# Patient Record
Sex: MALE | Race: WHITE | Employment: OTHER | URBAN - METROPOLITAN AREA
[De-identification: names, ages, dates, MRNs, and addresses within clinical notes are randomized per-mention and may not be internally consistent; named-entity substitution may affect disease eponyms.]

---

## 2017-07-28 PROBLEM — I65.23 BILATERAL CAROTID ARTERY STENOSIS: Chronic | Status: ACTIVE | Noted: 2017-07-28

## 2020-07-28 ENCOUNTER — HOSPITAL ENCOUNTER (OUTPATIENT)
Dept: PHYSICAL THERAPY | Age: 80
Discharge: HOME | End: 2020-07-28
Payer: MEDICARE

## 2020-07-28 ENCOUNTER — HOME HEALTH ADMISSION (OUTPATIENT)
Dept: HOME HEALTH SERVICES | Facility: HOME HEALTH | Age: 80
End: 2020-07-28
Payer: MEDICARE

## 2020-07-28 ENCOUNTER — HOSPITAL ENCOUNTER (OUTPATIENT)
Dept: SURGERY | Age: 80
Discharge: HOME | End: 2020-07-28
Payer: MEDICARE

## 2020-07-28 VITALS
TEMPERATURE: 97.4 F | OXYGEN SATURATION: 95 % | HEIGHT: 65 IN | RESPIRATION RATE: 16 BRPM | WEIGHT: 197.4 LBS | BODY MASS INDEX: 32.89 KG/M2 | SYSTOLIC BLOOD PRESSURE: 139 MMHG | DIASTOLIC BLOOD PRESSURE: 87 MMHG | HEART RATE: 59 BPM

## 2020-07-28 LAB
ANION GAP SERPL CALC-SCNC: 8 MMOL/L (ref 7–16)
APTT PPP: 27.1 SEC (ref 24.3–35.4)
ATRIAL RATE: 468 BPM
BACTERIA SPEC CULT: NORMAL
BASOPHILS # BLD: 0.1 K/UL (ref 0–0.2)
BASOPHILS NFR BLD: 1 % (ref 0–2)
BUN SERPL-MCNC: 30 MG/DL (ref 8–23)
CALCIUM SERPL-MCNC: 9.4 MG/DL (ref 8.3–10.4)
CALCULATED R AXIS, ECG10: 50 DEGREES
CALCULATED T AXIS, ECG11: 55 DEGREES
CHLORIDE SERPL-SCNC: 105 MMOL/L (ref 98–107)
CO2 SERPL-SCNC: 28 MMOL/L (ref 21–32)
CREAT SERPL-MCNC: 1.6 MG/DL (ref 0.8–1.5)
DIAGNOSIS, 93000: NORMAL
DIFFERENTIAL METHOD BLD: NORMAL
EOSINOPHIL # BLD: 0.2 K/UL (ref 0–0.8)
EOSINOPHIL NFR BLD: 2 % (ref 0.5–7.8)
ERYTHROCYTE [DISTWIDTH] IN BLOOD BY AUTOMATED COUNT: 13.1 % (ref 11.9–14.6)
EST. AVERAGE GLUCOSE BLD GHB EST-MCNC: 148 MG/DL
GLUCOSE SERPL-MCNC: 129 MG/DL (ref 65–100)
HBA1C MFR BLD: 6.8 %
HCT VFR BLD AUTO: 48.6 % (ref 41.1–50.3)
HGB BLD-MCNC: 16.2 G/DL (ref 13.6–17.2)
IMM GRANULOCYTES # BLD AUTO: 0 K/UL (ref 0–0.5)
IMM GRANULOCYTES NFR BLD AUTO: 0 % (ref 0–5)
INR PPP: 1.1
LYMPHOCYTES # BLD: 1.1 K/UL (ref 0.5–4.6)
LYMPHOCYTES NFR BLD: 14 % (ref 13–44)
MCH RBC QN AUTO: 32.5 PG (ref 26.1–32.9)
MCHC RBC AUTO-ENTMCNC: 33.3 G/DL (ref 31.4–35)
MCV RBC AUTO: 97.4 FL (ref 79.6–97.8)
MONOCYTES # BLD: 0.7 K/UL (ref 0.1–1.3)
MONOCYTES NFR BLD: 9 % (ref 4–12)
NEUTS SEG # BLD: 5.9 K/UL (ref 1.7–8.2)
NEUTS SEG NFR BLD: 74 % (ref 43–78)
NRBC # BLD: 0 K/UL (ref 0–0.2)
PLATELET # BLD AUTO: 212 K/UL (ref 150–450)
PMV BLD AUTO: 11.6 FL (ref 9.4–12.3)
POTASSIUM SERPL-SCNC: 4.1 MMOL/L (ref 3.5–5.1)
PROTHROMBIN TIME: 14.6 SEC (ref 12–14.7)
Q-T INTERVAL, ECG07: 416 MS
QRS DURATION, ECG06: 82 MS
QTC CALCULATION (BEZET), ECG08: 416 MS
RBC # BLD AUTO: 4.99 M/UL (ref 4.23–5.6)
SERVICE CMNT-IMP: NORMAL
SODIUM SERPL-SCNC: 141 MMOL/L (ref 136–145)
VENTRICULAR RATE, ECG03: 60 BPM
WBC # BLD AUTO: 7.9 K/UL (ref 4.3–11.1)

## 2020-07-28 PROCEDURE — 83036 HEMOGLOBIN GLYCOSYLATED A1C: CPT

## 2020-07-28 PROCEDURE — 85025 COMPLETE CBC W/AUTO DIFF WBC: CPT

## 2020-07-28 PROCEDURE — 93005 ELECTROCARDIOGRAM TRACING: CPT | Performed by: PHYSICIAN ASSISTANT

## 2020-07-28 PROCEDURE — 36415 COLL VENOUS BLD VENIPUNCTURE: CPT

## 2020-07-28 PROCEDURE — 85610 PROTHROMBIN TIME: CPT

## 2020-07-28 PROCEDURE — 80048 BASIC METABOLIC PNL TOTAL CA: CPT

## 2020-07-28 PROCEDURE — 85730 THROMBOPLASTIN TIME PARTIAL: CPT

## 2020-07-28 PROCEDURE — 97161 PT EVAL LOW COMPLEX 20 MIN: CPT

## 2020-07-28 PROCEDURE — 77030027138 HC INCENT SPIROMETER -A

## 2020-07-28 PROCEDURE — 87641 MR-STAPH DNA AMP PROBE: CPT

## 2020-07-28 NOTE — PERIOP NOTES
Patient verified name and . Order for consent  found in EHR and matches case posting; patient verified. Left total hip arthroplasty     Type 3 surgery, PAT Joint assessment complete. Labs per surgeon: CBC,BMP, PT/PTT, HgbA1C; results pending. Labs per anesthesia protocol: no additional lab work needed. EKG:Done today- within anesthesia guidelines. Last cardiology office visit dated 10/2/19, last EKG dated 3/22/19, last Echo dated 19, last cath dated 5/27/10 and cardiac clearance with 3 day eliquis hold dated 20 found in EHR if needed for anesthesia reference. A negative Covid swab result is required to proceed with surgery; The testing center is located at the [address], [place]. An appointment is required therefore the patient will be contacted by the Covid swab team. The testing clinic is closed from 12-1 for lunch and on weekends. For questions or concerns the patient should call [phone]. Appointment date/time  found in EHR and provided to patient. MRSA/MSSA swab collected; pharmacy to review and dose antibiotic as appropriate. Hospital approved surgical skin cleanser and instructions to return bottle on DOS given per hospital policy. Patient provided with handouts including Guide to Surgery, Pain Management, Hand Hygiene, Blood Transfusion Education, and [facility] Brochure. Patient answered medical/surgical history questions at their best of ability. All prior to admission medications documented in [facility]. Original medication prescription bottle not visualized during patient appointment. Patient instructed to hold all vitamins 3 weeks prior to surgery and NSAIDS 5 days prior to surgery. Patient teach back successful and patient demonstrates knowledge of instruction.

## 2020-07-28 NOTE — PERIOP NOTES
CRT 1.6. Daniel Hazy, NP notified. No new orders received. Lab results within anesthesia guidelines. Lab results sent to PCP per surgeon's request.       Recent Results (from the past 12 hour(s))   CBC WITH AUTOMATED DIFF    Collection Time: 07/28/20 11:57 AM   Result Value Ref Range    WBC 7.9 4.3 - 11.1 K/uL    RBC 4.99 4.23 - 5.6 M/uL    HGB 16.2 13.6 - 17.2 g/dL    HCT 48.6 41.1 - 50.3 %    MCV 97.4 79.6 - 97.8 FL    MCH 32.5 26.1 - 32.9 PG    MCHC 33.3 31.4 - 35.0 g/dL    RDW 13.1 11.9 - 14.6 %    PLATELET 129 153 - 093 K/uL    MPV 11.6 9.4 - 12.3 FL    ABSOLUTE NRBC 0.00 0.0 - 0.2 K/uL    DF AUTOMATED      NEUTROPHILS 74 43 - 78 %    LYMPHOCYTES 14 13 - 44 %    MONOCYTES 9 4.0 - 12.0 %    EOSINOPHILS 2 0.5 - 7.8 %    BASOPHILS 1 0.0 - 2.0 %    IMMATURE GRANULOCYTES 0 0.0 - 5.0 %    ABS. NEUTROPHILS 5.9 1.7 - 8.2 K/UL    ABS. LYMPHOCYTES 1.1 0.5 - 4.6 K/UL    ABS. MONOCYTES 0.7 0.1 - 1.3 K/UL    ABS. EOSINOPHILS 0.2 0.0 - 0.8 K/UL    ABS. BASOPHILS 0.1 0.0 - 0.2 K/UL    ABS. IMM.  GRANS. 0.0 0.0 - 0.5 K/UL   HEMOGLOBIN A1C WITH EAG    Collection Time: 07/28/20 11:57 AM   Result Value Ref Range    Hemoglobin A1c 6.8 %    Est. average glucose 469 mg/dL   METABOLIC PANEL, BASIC    Collection Time: 07/28/20 11:57 AM   Result Value Ref Range    Sodium 141 136 - 145 mmol/L    Potassium 4.1 3.5 - 5.1 mmol/L    Chloride 105 98 - 107 mmol/L    CO2 28 21 - 32 mmol/L    Anion gap 8 7 - 16 mmol/L    Glucose 129 (H) 65 - 100 mg/dL    BUN 30 (H) 8 - 23 MG/DL    Creatinine 1.60 (H) 0.8 - 1.5 MG/DL    GFR est AA 54 (L) >60 ml/min/1.73m2    GFR est non-AA 44 (L) >60 ml/min/1.73m2    Calcium 9.4 8.3 - 10.4 MG/DL   PROTHROMBIN TIME + INR    Collection Time: 07/28/20 11:57 AM   Result Value Ref Range    Prothrombin time 14.6 12.0 - 14.7 sec    INR 1.1     PTT    Collection Time: 07/28/20 11:57 AM   Result Value Ref Range    aPTT 27.1 24.3 - 35.4 SEC

## 2020-07-28 NOTE — PROGRESS NOTES
Eliane Patiño  : [birth year](72 y.o.) [address] at [address], [place] [address].  [phone]       Physical Therapy Prehab Plan of Treatment and Evaluation Summary:2020    ICD-10: Treatment Diagnosis:   · Pain in left hip (M25.552)  · Stiffness of Left Hip, Not elsewhere classified (M25.652)  · Difficulty in walking, Not elsewhere classified (R26.2)  · Other abnormalities of gait and mobility (R26.89)  Precautions/Allergies:   Patient has no known allergies. MEDICAL/REFERRING DIAGNOSIS:  Unilateral primary osteoarthritis, left hip [M16.12]  REFERRING PHYSICIAN: Yung Moreno,*  DATE OF SURGERY: 20    Assessment:   Comments:  Pt presents using a walker. Pain in left hip joint with decreased independence with functional mobility. Pt plans to return home following hospital stay. Pt expresses concern regarding length of stay, hoping he can stay long enough to monitor pts vitals. Pt motivated and prepared for surgery. Pt's wife present and supportive. 8/3--Returned phone call received from pt's daughter. Pt's daughter states her parents are hard of hearing, so she wanted to touch base. Pt's daughter understands that the pt will be receiving home health care upon returning from the hospital. Pt daughter states her mom will be unable to assist her father physically. Pt's daughter also inquiring as to how pt will bathe. Currently, pt takes tub baths. Remember speaking with pt and pt's wife during prehab session and suggesting a tub bench. I believe pt's wife said they have shower door, which would make this difficult. Pt's daughter going to check around to see if she can find a small shower chair that would be adequate. PROBLEM LIST (Impacting functional limitations):  Mr. Girish Pond presents with the following left lower extremity(s) problems:  1. Transfers  2. Gait  3. Strength  4. Range of Motion  5. Pain   INTERVENTIONS PLANNED:  1.  Home Exercise Program  2. Educational Discussion      TREATMENT PLAN: Effective Dates: 7/28/2020 TO 7/28/2020. Frequency/Duration: Patient to continue to perform home exercise program at least twice per day up until his surgery. GOALS: (Goals have been discussed and agreed upon with patient.)  Discharge Goals: Time Frame: 1 Day  1. Patient will demonstrate independence with a home exercise program designed to increase strength, range of motion and pain control to minimize functional deficits and optimize patient for total joint replacement. Rehabilitation Potential For Stated Goals: Good  Regarding Tommie Mariscal's therapy, I certify that the treatment plan above will be carried out by a therapist or under their direction. Thank you for this referral,  Martin Bragg, PT               HISTORY:   Present Symptoms:  Pain Intensity 1: 10  Pain Location 1: Hip  Pain Orientation 1: Left   History of Present Injury/Illness (Reason for Referral):  Medical/Referring Diagnosis: Unilateral primary osteoarthritis, left hip [M16.12]   Past Medical History/Comorbidities:   Mr. CHICASΤΟΚΟΠΟTRIPP  has a past medical history of Arrhythmia, Arthritis, CAD (coronary artery disease) (2010), Coagulation disorder ([facility] [address] Coronary atherosclerosis of native coronary artery (5/27/2010), Diabetes ([address] (type 2), Dyslipidemia (5/27/2010), Essential hypertension, benign ( ), History of coronary artery stent placement (2010), History of kidney stones, History of prior ablation treatment (2014), Kidney disease, Paroxysmal atrial fibrillation ([address] (10/2/2015), Platelet inhibition due to Plavix, Post PTCA, Shingles outbreak, and Syncope and collapse (07/25/2014). He also has no past medical history of Coagulation defects or Unspecified adverse effect of anesthesia.   Mr. CHICASΤΟΚΟΠΟTRIPP  has a past surgical history that includes hx cyst removal (years ago); hx heart catheterization (2010); pr cardiac surg procedure unlist (2014); and hx cataract removal (Bilateral). Social History/Living Environment:   Home Environment: Private residence  # Steps to Enter: 1  One/Two Story Residence: One story  Living Alone: No  Support Systems: Spouse/Significant Other/Partner  Patient Expects to be Discharged to[de-identified] Private residence  Current DME Used/Available at Home: [address], [address], straight, [employer], bedside  Tub or Shower Type: Tub/Shower combination  Work/Activity:  retired  Dominant Side:  LEFT  Current Medications:  See Pre-assessment nursing note   Number of Personal Factors/Comorbidities that affect the Plan of Care: 0: LOW COMPLEXITY   EXAMINATION:   ADLs (Current Functional Status):   Ambulation:  [x] Independent  [] Walk Indoors Only  [] Walk Outdoors  [] Use Assistive Device  [] Use Wheelchair Only Dressing:  [x] Independent  Requires Assistance from Someone for:  [] Sock/Shoes  [] Pants  [] Everything   Bathing/Showering:   [x] Independent  [] Requires Assistance from Someone  [] [address]:  [x] Routine house and yard work  [] Light Housework Only  [] None   Observation/Orthostatic Postural Assessment:   Within defined limits   ROM/Flexibility:   AROM: Generally decreased, functional                       RLE Assessment  RLE Assessment (WDL): Within defined limits   Strength:   Strength: Generally decreased, functional                  Functional Mobility:         Gait Description (WDL): Within defined limits  Stand to Sit: Independent  Sit to Stand: Independent  Distance (ft): 200 Feet (ft)  Ambulation - Level of Assistance: Independent  Gait Abnormalities: Antalgic          Balance:    Sitting: Intact  Standing: Intact   Body Structures Involved:  1. Bones  2. Joints  3. Muscles  4. Ligaments Body Functions Affected:  1. Neuromusculoskeletal  2. Movement Related Activities and Participation Affected:  1.  Mobility   Number of elements that affect the Plan of Care: 4+: HIGH COMPLEXITY   CLINICAL PRESENTATION:   Presentation: Stable and uncomplicated: LOW COMPLEXITY   CLINICAL DECISION MAKING:   Outcome Measure: Tool Used: Lower Extremity Functional Scale (LEFS)  Score:  Initial: 25/80 Most Recent: X/80 (Date: -- )   Interpretation of Score: 20 questions each scored on a 5 point scale with 0 representing \"extreme difficulty or unable to perform\" and 4 representing \"no difficulty\". The lower the score, the greater the functional disability. 80/80 represents no disability. Minimal detectable change is 9 points. Medical Necessity:   · Mr. Roni Roberts is expected to optimize his lower extremity strength and ROM in preparation for joint replacement surgery. Reason for Services/Other Comments:  · Achieve baseline assesment of musculoskeletal system, functional mobility and home environment. , educate in PT HEP in preparation for surgery, educate in hospital plan of care. Use of outcome tool(s) and clinical judgement create a POC that gives a: Clear prediction of patient's progress: LOW COMPLEXITY   TREATMENT:   Treatment/Session Assessment:  Patient was instructed in PT- HEP to increase strength and ROM in LEs. Answered all questions. · Post session pain:  10  · Compliance with Program/Exercises: compliant most of the time.   Total Treatment Duration:  PT Patient Time In/Time Out  Time In: 1215  Time Out: [address], PT

## 2020-07-28 NOTE — PROGRESS NOTES
07/28/20 1200   Oxygen Therapy   O2 Sat (%) 98 %   Pulse via Oximetry 52 beats per minute   O2 Device Room air   Pre-Treatment   Breath Sounds Bilateral Clear;Diminished   Incentive Spirometry Treatment   Actual Volume (ml) 2000 ml     Initial respiratory Assessment completed with pt. Pt was interviewed and evaluated in Joint camp prior to surgery. Patient ID:  Arminda Crowley  [id number]  52 y.o.  1940  Surgeon: Dr. Ree Grider  Date of Surgery: 8/11/2020  Procedure: Total Right Hip Arthroplasty  Primary Care Physician: Joon Mcbride [phone]  Specialists:     Pt. IS PRACTICING SOCIAL DISTANCING AND WEARING A MASK WHEN IN PUBLIC. Pt instructed in the use of Incentive Spirometry. Pt instructed to bring Incentive Spirometer back on date of surgery & to start using Is upon return to pt room. Written instructions given to patient.   Pt taught proper cough technique    History of smoking:   DENIES                 Quit date:         Secondhand smoke:father    Past procedures with Oxygen desaturation or delayed awakening:DENIES    Past Medical History:   Diagnosis Date    Arrhythmia     AFlutter/SVT    Arthritis     CAD (coronary artery disease) 2010    2 xstents, Followed by Dr. Vee Gayle Coagulation disorder [facility])     eliquis/plavix    Coronary atherosclerosis of native coronary artery 5/27/2010    2010:  PCI LAD Xience x 2    Diabetes ([address] type 2    Controlled with diet     Dyslipidemia 5/27/2010    Essential hypertension, benign      History of coronary artery stent placement 2010    LAC, LAD stented    History of kidney stones     History of prior ablation treatment 2014    due to atrial fib    Kidney disease     Was seen by [place] [facility] after sepsis for acute Kidney disease, has since been released    Paroxysmal atrial fibrillation ([address] 10/2/2015    Platelet inhibition due to Plavix     Post PTCA     WITH STENT    Sepsis ([address]     Shingles outbreak     Syncope and collapse 07/25/2014        Respiratory history:                                 SOB  ON EXERTION                                    Respiratory meds:  DENIES    FAMILY PRESENT:          WIFE                                                PAST SLEEP STUDY:                      DENIES  HX OF NATANAEL:                                        DENIES  NATANAEL assessment:     HX OF A-FIB                                          SLEEPS ON SIDE       &      BACK                                                      PHYSICAL EXAM   Body mass index is 32.85 kg/m².    Visit Vitals  /87 (BP 1 Location: Left arm, BP Patient Position: At rest;Sitting)   Pulse (!) 59   Temp 97.4 °F (36.3 °C)   Resp 16   Ht 5' 5\" (1.651 m)   Wt 89.5 kg (197 lb 6.4 oz)   SpO2 95%   BMI 32.85 kg/m²     Neck circumference:  44    cm    Loud snoring:                                                 YES             Witnessed apnea or wakening gasping or choking:        DENIES       Awakens with headaches:                                               DENIES  Morning or daytime tiredness/ sleepiness:                              TIRED  Dry mouth or sore throat in morning:            YES         ]                                   Sandy stage:  4                                   SACS score:25  Stop Bang   STOP-BANG  Does the patient snore loudly (louder than talking or loud enough to be heard through closed doors)?: Yes  Does the patient often feel tired, fatigued, or sleepy during the daytime, even after a \"good\" night's sleep?: Yes  Has anyone ever observed the patient stop breathing during their sleep? : Yes  Does the patient have or are they being treated for high blood pressure?: Yes  Is the patient's BMI greater than 35?: No  Is your neck circumference greater than 17 inches (Male) or 16 inches (Female)?: Yes  Is the patient older than 48?: Yes  Is the patient male?: Yes  NATANAEL Score: 7  Has the patient been referred to Sleep Medicine?: No  Has the patient previously been diagnosed with Obstructive Sleep Apnea?: No                                  [facility]                                    Referrals:  DECLINED  Pt.  Phone Number:

## 2020-07-28 NOTE — PERIOP NOTES
PLEASE CONTINUE TAKING ALL PRESCRIPTION MEDICATIONS UP TO THE DAY OF SURGERY UNLESS OTHERWISE DIRECTED BELOW. DISCONTINUE all vitamins and supplements 21 days prior to surgery. DISCONTINUE Non-Steriodal Anti-Inflammatory (NSAIDS) such as Advil and Aleve 5 days prior to surgery. Home Medications to take  the day of surgery    aspirin, metoprolol           Home Medications   to Hold   Stop eliquis 3 days prior to surgery   Stop meloxicam 5 days prior to surgery      Comments          *Visitor policy of 1 visitor per patient discussed. Please do not bring home medications with you on the day of surgery unless otherwise directed by your nurse. If you are instructed to bring home medications, please give them to your nurse as they will be administered by the nursing staff. If you have any questions, please call [facility] [phone]. A copy of this note was provided to the patient for reference.

## 2020-07-29 NOTE — ADVANCED PRACTICE NURSE
Total Joint Surgery Preoperative Chart Review      Patient ID:  Gifty Quezada  [id number]  48 y.o.  1940  Surgeon: Dr. Lasha Elizabeth  Date of Surgery: 8/11/2020  Procedure: Total Left Hip Arthroplasty  Primary Care Physician: Clem Corral [phone]  Specialty Physician(s):      Subjective:   Gifty Quezada is a [de-identified] y.o. WHITE OR  male who presents for preoperative evaluation for Total Left Hip arthroplasty. This is a preoperative chart review note based on data collected by the nurse at the surgical Pre-Assessment visit.     Past Medical History:   Diagnosis Date    Arrhythmia     AFlutter/SVT    Arthritis     CAD (coronary artery disease) 2010    2 xstents, Followed by Dr. Zoe Holcomb Coagulation disorder [facility])     eliquis/plavix    Coronary atherosclerosis of native coronary artery 5/27/2010    2010:  PCI LAD Xience x 2    Diabetes ([address] type 2    Controlled with diet     Dyslipidemia 5/27/2010    Essential hypertension, benign      History of coronary artery stent placement 2010    LAC, LAD stented    History of kidney stones     History of prior ablation treatment 2014    due to atrial fib    Kidney disease     Was seen by [place] [facility] after sepsis for acute Kidney disease, has since been released    Paroxysmal atrial fibrillation ([address] 10/2/2015    Platelet inhibition due to Plavix     Post PTCA     WITH STENT    Sepsis ([address]     Shingles outbreak     Syncope and collapse 07/25/2014      Past Surgical History:   Procedure Laterality Date    CARDIAC SURG PROCEDURE UNLIST  2014    ablation    HX CATARACT REMOVAL Bilateral     HX CYST REMOVAL  years ago    scrotal    HX HEART CATHETERIZATION  2010    2 stents     Family History   Problem Relation Age of Onset    Heart Disease Mother     Diabetes Father     Diabetes Brother     Diabetes Brother     Cancer Brother     Heart Disease Sister         mild heart attack      Social History     Tobacco Use  Smoking status: Never Smoker    Smokeless tobacco: Never Used   Substance Use Topics    Alcohol use: No       Prior to Admission medications    Medication Sig Start Date End Date Taking? Authorizing Provider   apixaban (ELIQUIS) 5 mg tablet Take 1 Tab by mouth two (2) times a day. 3/23/20  Yes Romeo Alexander MD   losartan (COZAAR) 100 mg tablet Take 1 Tab by mouth daily. 3/22/19  Yes Romeo Alexander MD   pravastatin (PRAVACHOL) 40 mg tablet Take 1 Tab by mouth nightly. Patient taking differently: Take 20 mg by mouth nightly. 3/22/19  Yes Romeo Alexander MD   aspirin delayed-release 81 mg tablet Take 1 Tab by mouth daily. 3/14/18  Yes Romeo Alexander MD   metoprolol tartrate (LOPRESSOR) 25 mg tablet Take 0.5 Tabs by mouth two (2) times a day. Indications: VENTRICULAR RATE CONTROL IN ATRIAL FIBRILLATION 1/27/17  Yes Minh Orantes MD   hydrochlorothiazide (MICROZIDE) 12.5 mg capsule Take 12.5 mg by mouth daily. Yes Provider, Historical   meloxicam (MOBIC) 7.5 mg tablet Take 7.5 mg by mouth daily as needed. 2/22/16  Yes Provider, Historical   nitroglycerin (NITROSTAT) 0.4 mg SL tablet 0.4 mg by SubLINGual route every five (5) minutes as needed for Chest Pain. Yes Other, MD Kobe     No Known Allergies       Objective:     Physical Exam:   Patient Vitals for the past 24 hrs:   Temp Pulse Resp BP SpO2   07/28/20 1322 97.4 °F (36.3 °C) (!) 59 16 139/87 95 %       ECG:    EKG Results     Procedure 720 Value Units Date/Time    EKG, 12 LEAD, INITIAL [[id number]] Collected:  07/28/20 1254    Order Status:  Completed Updated:  07/28/20 1745     Ventricular Rate 60 BPM      Atrial Rate 468 BPM      QRS Duration 82 ms      Q-T Interval 416 ms      QTC Calculation (Bezet) 416 ms      Calculated R Axis 50 degrees      Calculated T Axis 55 degrees      Diagnosis --     Atrial fibrillation  Abnormal ECG  When compared with ECG of 26-JUN-2015 17:18,  Atrial fibrillation has replaced Sinus rhythm  Vent. rate has decreased BY  34 BPM  Confirmed by BRIDGET CH ()Heydi ([id number]) on 7/28/2020 5:45:05 PM            Data Review:   Labs:   Recent Results (from the past 24 hour(s))   EKG, 12 LEAD, INITIAL    Collection Time: 07/28/20 12:54 PM   Result Value Ref Range    Ventricular Rate 60 BPM    Atrial Rate 468 BPM    QRS Duration 82 ms    Q-T Interval 416 ms    QTC Calculation (Bezet) 416 ms    Calculated R Axis 50 degrees    Calculated T Axis 55 degrees    Diagnosis       Atrial fibrillation  Abnormal ECG  When compared with ECG of 26-JUN-2015 17:18,  Atrial fibrillation has replaced Sinus rhythm  Vent. rate has decreased BY  34 BPM  Confirmed by BRIDGET CH ()Heydi ([id number]) on 7/28/2020 5:45:05 PM     MSSA/MRSA SC BY PCR, NASAL SWAB    Collection Time: 07/28/20  1:41 PM    Specimen: Nasal swab   Result Value Ref Range    Special Requests: NO SPECIAL REQUESTS      Culture result:        SA target not detected. A MRSA NEGATIVE, SA NEGATIVE test result does not preclude MRSA or SA nasal colonization. Labs reviewed    Problem List:  )  Patient Active Problem List   Diagnosis Code    Atherosclerosis of native coronary artery of native heart with stable angina pectoris ([facility] [address] I25.118    Essential hypertension, benign I10    Dyslipidemia E78.5    WPW (Regi-Parkinson-White syndrome) I45.6    Acute pyelonephritis N10    Sepsis ([address] A41.9    Ureteral stone N20.1    ANNA (acute kidney injury) ([facility] [address] N17.9    Acute on chronic kidney disease, stage 4 N18.4    Obstructed, uropathy N13.9    Hypotension I95.9    Bacteremia due to Gram-negative bacteria R78.81    Chronic atrial fibrillation (HCC) I48.20    Bilateral carotid artery stenosis I65.23       Total Joint Surgery Pre-Assessment Recommendations:           He/she is a moderate risk for sleep apnea but is not interested in additional work up at this time. Will initiate perioperative NATANAEL precautions.  Recommend continuous saturation monitoring hours of sleep, during hospitalization.           Signed By: ELMER Valentine    July 29, 2020

## 2020-08-07 RX ORDER — CEFAZOLIN SODIUM/WATER 2 G/20 ML
2 SYRINGE (ML) INTRAVENOUS ONCE
Status: CANCELLED | OUTPATIENT
Start: 2020-08-11 | End: 2020-08-11

## 2020-08-10 ENCOUNTER — ANESTHESIA EVENT (OUTPATIENT)
Dept: SURGERY | Age: 80
DRG: 470 | End: 2020-08-10
Payer: MEDICARE

## 2020-08-11 ENCOUNTER — HOSPITAL ENCOUNTER (INPATIENT)
Age: 80
LOS: 2 days | Discharge: HOME HEALTH | DRG: 470 | End: 2020-08-13
Attending: ORTHOPAEDIC SURGERY | Admitting: ORTHOPAEDIC SURGERY
Payer: MEDICARE

## 2020-08-11 ENCOUNTER — APPOINTMENT (OUTPATIENT)
Dept: GENERAL RADIOLOGY | Age: 80
DRG: 470 | End: 2020-08-11
Attending: PHYSICIAN ASSISTANT
Payer: MEDICARE

## 2020-08-11 ENCOUNTER — ANESTHESIA (OUTPATIENT)
Dept: SURGERY | Age: 80
DRG: 470 | End: 2020-08-11
Payer: MEDICARE

## 2020-08-11 DIAGNOSIS — Z96.642 STATUS POST TOTAL HIP REPLACEMENT, LEFT: Primary | ICD-10-CM

## 2020-08-11 PROBLEM — M16.12 ARTHRITIS OF LEFT HIP: Status: ACTIVE | Noted: 2020-08-11

## 2020-08-11 LAB
GLUCOSE BLD STRIP.AUTO-MCNC: 109 MG/DL (ref 65–100)
HGB BLD-MCNC: 14.3 G/DL (ref 13.6–17.2)

## 2020-08-11 PROCEDURE — 0SRB03A REPLACEMENT OF LEFT HIP JOINT WITH CERAMIC SYNTHETIC SUBSTITUTE, UNCEMENTED, OPEN APPROACH: ICD-10-PCS | Performed by: ORTHOPAEDIC SURGERY

## 2020-08-11 PROCEDURE — 77030006835 HC BLD SAW SAG STRY -B: Performed by: ORTHOPAEDIC SURGERY

## 2020-08-11 PROCEDURE — 97165 OT EVAL LOW COMPLEX 30 MIN: CPT

## 2020-08-11 PROCEDURE — C1776 JOINT DEVICE (IMPLANTABLE): HCPCS | Performed by: ORTHOPAEDIC SURGERY

## 2020-08-11 PROCEDURE — 76060000034 HC ANESTHESIA 1.5 TO 2 HR: Performed by: ORTHOPAEDIC SURGERY

## 2020-08-11 PROCEDURE — 74011000250 HC RX REV CODE- 250: Performed by: ANESTHESIOLOGY

## 2020-08-11 PROCEDURE — 77030040361 HC SLV COMPR DVT MDII -B

## 2020-08-11 PROCEDURE — 77030040922 HC BLNKT HYPOTHRM STRY -A: Performed by: ANESTHESIOLOGY

## 2020-08-11 PROCEDURE — 72170 X-RAY EXAM OF PELVIS: CPT

## 2020-08-11 PROCEDURE — 77030018547 HC SUT ETHBND1 J&J -B: Performed by: ORTHOPAEDIC SURGERY

## 2020-08-11 PROCEDURE — 74011000250 HC RX REV CODE- 250: Performed by: ORTHOPAEDIC SURGERY

## 2020-08-11 PROCEDURE — 74011250636 HC RX REV CODE- 250/636: Performed by: NURSE ANESTHETIST, CERTIFIED REGISTERED

## 2020-08-11 PROCEDURE — 74011250636 HC RX REV CODE- 250/636: Performed by: PHYSICIAN ASSISTANT

## 2020-08-11 PROCEDURE — 77030007880 HC KT SPN EPDRL BBMI -B: Performed by: ANESTHESIOLOGY

## 2020-08-11 PROCEDURE — 85018 HEMOGLOBIN: CPT

## 2020-08-11 PROCEDURE — 97161 PT EVAL LOW COMPLEX 20 MIN: CPT

## 2020-08-11 PROCEDURE — 65270000029 HC RM PRIVATE

## 2020-08-11 PROCEDURE — 74011000250 HC RX REV CODE- 250: Performed by: PHYSICIAN ASSISTANT

## 2020-08-11 PROCEDURE — 74011250637 HC RX REV CODE- 250/637: Performed by: PHYSICIAN ASSISTANT

## 2020-08-11 PROCEDURE — 74011250636 HC RX REV CODE- 250/636: Performed by: ANESTHESIOLOGY

## 2020-08-11 PROCEDURE — 97110 THERAPEUTIC EXERCISES: CPT

## 2020-08-11 PROCEDURE — 82962 GLUCOSE BLOOD TEST: CPT

## 2020-08-11 PROCEDURE — 94760 N-INVAS EAR/PLS OXIMETRY 1: CPT

## 2020-08-11 PROCEDURE — 76010000162 HC OR TIME 1.5 TO 2 HR INTENSV-TIER 1: Performed by: ORTHOPAEDIC SURGERY

## 2020-08-11 PROCEDURE — 76210000006 HC OR PH I REC 0.5 TO 1 HR: Performed by: ORTHOPAEDIC SURGERY

## 2020-08-11 PROCEDURE — 74011000250 HC RX REV CODE- 250: Performed by: NURSE ANESTHETIST, CERTIFIED REGISTERED

## 2020-08-11 PROCEDURE — 77030003665 HC NDL SPN BBMI -A: Performed by: ANESTHESIOLOGY

## 2020-08-11 PROCEDURE — 77030013708 HC HNDPC SUC IRR PULS STRY –B: Performed by: ORTHOPAEDIC SURGERY

## 2020-08-11 PROCEDURE — 36415 COLL VENOUS BLD VENIPUNCTURE: CPT

## 2020-08-11 PROCEDURE — 77030018074 HC RTVR SUT ARTH4 S&N -B: Performed by: ORTHOPAEDIC SURGERY

## 2020-08-11 PROCEDURE — 97535 SELF CARE MNGMENT TRAINING: CPT

## 2020-08-11 PROCEDURE — 51798 US URINE CAPACITY MEASURE: CPT

## 2020-08-11 PROCEDURE — 94762 N-INVAS EAR/PLS OXIMTRY CONT: CPT

## 2020-08-11 PROCEDURE — 77030002933 HC SUT MCRYL J&J -A: Performed by: ORTHOPAEDIC SURGERY

## 2020-08-11 PROCEDURE — 77030002966 HC SUT PDS J&J -A: Performed by: ORTHOPAEDIC SURGERY

## 2020-08-11 PROCEDURE — 77030019557 HC ELECTRD VES SEAL MEDT -F: Performed by: ORTHOPAEDIC SURGERY

## 2020-08-11 PROCEDURE — 74011250636 HC RX REV CODE- 250/636: Performed by: ORTHOPAEDIC SURGERY

## 2020-08-11 PROCEDURE — 74011000258 HC RX REV CODE- 258: Performed by: ORTHOPAEDIC SURGERY

## 2020-08-11 PROCEDURE — 77030012935 HC DRSG AQUACEL BMS -B: Performed by: ORTHOPAEDIC SURGERY

## 2020-08-11 DEVICE — LINER ACET OD56MM ID36MM HIP ALTRX PINN: Type: IMPLANTABLE DEVICE | Site: HIP | Status: FUNCTIONAL

## 2020-08-11 DEVICE — SCREW BNE L25MM DIA6.5MM CANC HIP S STL GRIPTION FULL THRD: Type: IMPLANTABLE DEVICE | Site: HIP | Status: FUNCTIONAL

## 2020-08-11 DEVICE — ACTIS DUOFIX HIP PROSTHESIS (FEMORAL STEM 12/14 TAPER CEMENTLESS SIZE 6 HIGH COLLAR)  CE
Type: IMPLANTABLE DEVICE | Site: HIP | Status: FUNCTIONAL
Brand: ACTIS

## 2020-08-11 DEVICE — HIP H2 TOT ADV OTHER HD IMPL CAPPED SYNTHES: Type: IMPLANTABLE DEVICE | Status: FUNCTIONAL

## 2020-08-11 DEVICE — CUP ACET DIA56MM 12 H HIP TI GRIPTION MULT H II VIP TAPR: Type: IMPLANTABLE DEVICE | Site: HIP | Status: FUNCTIONAL

## 2020-08-11 DEVICE — BIOLOX DELTA CERAMIC FEMORAL HEAD +5.0 36MM DIA 12/14 TAPER
Type: IMPLANTABLE DEVICE | Site: HIP | Status: FUNCTIONAL
Brand: BIOLOX DELTA

## 2020-08-11 RX ORDER — HYDROMORPHONE HYDROCHLORIDE 2 MG/ML
0.5 INJECTION, SOLUTION INTRAMUSCULAR; INTRAVENOUS; SUBCUTANEOUS
Status: DISCONTINUED | OUTPATIENT
Start: 2020-08-11 | End: 2020-08-11 | Stop reason: HOSPADM

## 2020-08-11 RX ORDER — CEFAZOLIN SODIUM/WATER 2 G/20 ML
2 SYRINGE (ML) INTRAVENOUS EVERY 8 HOURS
Status: COMPLETED | OUTPATIENT
Start: 2020-08-11 | End: 2020-08-12

## 2020-08-11 RX ORDER — NALOXONE HYDROCHLORIDE 0.4 MG/ML
.2-.4 INJECTION, SOLUTION INTRAMUSCULAR; INTRAVENOUS; SUBCUTANEOUS
Status: DISCONTINUED | OUTPATIENT
Start: 2020-08-11 | End: 2020-08-13 | Stop reason: HOSPADM

## 2020-08-11 RX ORDER — SODIUM CHLORIDE, SODIUM LACTATE, POTASSIUM CHLORIDE, CALCIUM CHLORIDE 600; 310; 30; 20 MG/100ML; MG/100ML; MG/100ML; MG/100ML
100 INJECTION, SOLUTION INTRAVENOUS CONTINUOUS
Status: DISCONTINUED | OUTPATIENT
Start: 2020-08-11 | End: 2020-08-11 | Stop reason: HOSPADM

## 2020-08-11 RX ORDER — HYDROCHLOROTHIAZIDE 12.5 MG/1
12.5 CAPSULE ORAL DAILY
Status: DISCONTINUED | OUTPATIENT
Start: 2020-08-12 | End: 2020-08-13 | Stop reason: HOSPADM

## 2020-08-11 RX ORDER — BUPIVACAINE HYDROCHLORIDE 7.5 MG/ML
INJECTION, SOLUTION INTRASPINAL
Status: COMPLETED | OUTPATIENT
Start: 2020-08-11 | End: 2020-08-11

## 2020-08-11 RX ORDER — VANCOMYCIN HYDROCHLORIDE 1 G/20ML
INJECTION, POWDER, LYOPHILIZED, FOR SOLUTION INTRAVENOUS AS NEEDED
Status: DISCONTINUED | OUTPATIENT
Start: 2020-08-11 | End: 2020-08-11 | Stop reason: HOSPADM

## 2020-08-11 RX ORDER — SODIUM CHLORIDE 0.9 % (FLUSH) 0.9 %
5-40 SYRINGE (ML) INJECTION AS NEEDED
Status: DISCONTINUED | OUTPATIENT
Start: 2020-08-11 | End: 2020-08-13 | Stop reason: HOSPADM

## 2020-08-11 RX ORDER — ONDANSETRON 2 MG/ML
INJECTION INTRAMUSCULAR; INTRAVENOUS AS NEEDED
Status: DISCONTINUED | OUTPATIENT
Start: 2020-08-11 | End: 2020-08-11 | Stop reason: HOSPADM

## 2020-08-11 RX ORDER — ACETAMINOPHEN 500 MG
1000 TABLET ORAL EVERY 6 HOURS
Status: DISCONTINUED | OUTPATIENT
Start: 2020-08-11 | End: 2020-08-13 | Stop reason: HOSPADM

## 2020-08-11 RX ORDER — CEFAZOLIN SODIUM/WATER 2 G/20 ML
2 SYRINGE (ML) INTRAVENOUS ONCE
Status: COMPLETED | OUTPATIENT
Start: 2020-08-11 | End: 2020-08-11

## 2020-08-11 RX ORDER — OXYCODONE HYDROCHLORIDE 5 MG/1
10 TABLET ORAL
Status: DISCONTINUED | OUTPATIENT
Start: 2020-08-11 | End: 2020-08-13 | Stop reason: HOSPADM

## 2020-08-11 RX ORDER — ASPIRIN 81 MG/1
81 TABLET ORAL EVERY 12 HOURS
Status: DISCONTINUED | OUTPATIENT
Start: 2020-08-11 | End: 2020-08-13 | Stop reason: HOSPADM

## 2020-08-11 RX ORDER — HYDROMORPHONE HYDROCHLORIDE 1 MG/ML
1 INJECTION, SOLUTION INTRAMUSCULAR; INTRAVENOUS; SUBCUTANEOUS
Status: DISCONTINUED | OUTPATIENT
Start: 2020-08-11 | End: 2020-08-13 | Stop reason: HOSPADM

## 2020-08-11 RX ORDER — ROPIVACAINE HYDROCHLORIDE 2 MG/ML
INJECTION, SOLUTION EPIDURAL; INFILTRATION; PERINEURAL AS NEEDED
Status: DISCONTINUED | OUTPATIENT
Start: 2020-08-11 | End: 2020-08-11 | Stop reason: HOSPADM

## 2020-08-11 RX ORDER — OXYCODONE HYDROCHLORIDE 5 MG/1
5 TABLET ORAL
Status: DISCONTINUED | OUTPATIENT
Start: 2020-08-11 | End: 2020-08-11 | Stop reason: HOSPADM

## 2020-08-11 RX ORDER — ACETAMINOPHEN 325 MG/1
500 TABLET ORAL
COMMUNITY

## 2020-08-11 RX ORDER — SODIUM CHLORIDE 0.9 % (FLUSH) 0.9 %
5-40 SYRINGE (ML) INJECTION EVERY 8 HOURS
Status: DISCONTINUED | OUTPATIENT
Start: 2020-08-11 | End: 2020-08-13 | Stop reason: HOSPADM

## 2020-08-11 RX ORDER — TRANEXAMIC ACID 100 MG/ML
INJECTION, SOLUTION INTRAVENOUS AS NEEDED
Status: DISCONTINUED | OUTPATIENT
Start: 2020-08-11 | End: 2020-08-11 | Stop reason: HOSPADM

## 2020-08-11 RX ORDER — FENTANYL CITRATE 50 UG/ML
100 INJECTION, SOLUTION INTRAMUSCULAR; INTRAVENOUS ONCE
Status: DISCONTINUED | OUTPATIENT
Start: 2020-08-11 | End: 2020-08-11 | Stop reason: HOSPADM

## 2020-08-11 RX ORDER — LIDOCAINE HYDROCHLORIDE 10 MG/ML
0.1 INJECTION INFILTRATION; PERINEURAL AS NEEDED
Status: DISCONTINUED | OUTPATIENT
Start: 2020-08-11 | End: 2020-08-11 | Stop reason: HOSPADM

## 2020-08-11 RX ORDER — PROPOFOL 10 MG/ML
INJECTION, EMULSION INTRAVENOUS
Status: DISCONTINUED | OUTPATIENT
Start: 2020-08-11 | End: 2020-08-11 | Stop reason: HOSPADM

## 2020-08-11 RX ORDER — DEXAMETHASONE SODIUM PHOSPHATE 100 MG/10ML
10 INJECTION INTRAMUSCULAR; INTRAVENOUS ONCE
Status: ACTIVE | OUTPATIENT
Start: 2020-08-12 | End: 2020-08-13

## 2020-08-11 RX ORDER — ONDANSETRON 4 MG/1
4 TABLET, ORALLY DISINTEGRATING ORAL
Status: DISCONTINUED | OUTPATIENT
Start: 2020-08-11 | End: 2020-08-13 | Stop reason: HOSPADM

## 2020-08-11 RX ORDER — PROPOFOL 10 MG/ML
INJECTION, EMULSION INTRAVENOUS AS NEEDED
Status: DISCONTINUED | OUTPATIENT
Start: 2020-08-11 | End: 2020-08-11 | Stop reason: HOSPADM

## 2020-08-11 RX ORDER — AMOXICILLIN 250 MG
2 CAPSULE ORAL DAILY
Status: DISCONTINUED | OUTPATIENT
Start: 2020-08-12 | End: 2020-08-13 | Stop reason: HOSPADM

## 2020-08-11 RX ORDER — DEXAMETHASONE SODIUM PHOSPHATE 100 MG/10ML
INJECTION INTRAMUSCULAR; INTRAVENOUS AS NEEDED
Status: DISCONTINUED | OUTPATIENT
Start: 2020-08-11 | End: 2020-08-11 | Stop reason: HOSPADM

## 2020-08-11 RX ORDER — SODIUM CHLORIDE 9 MG/ML
100 INJECTION, SOLUTION INTRAVENOUS CONTINUOUS
Status: DISPENSED | OUTPATIENT
Start: 2020-08-11 | End: 2020-08-13

## 2020-08-11 RX ORDER — MIDAZOLAM HYDROCHLORIDE 1 MG/ML
2 INJECTION, SOLUTION INTRAMUSCULAR; INTRAVENOUS
Status: DISCONTINUED | OUTPATIENT
Start: 2020-08-11 | End: 2020-08-11 | Stop reason: HOSPADM

## 2020-08-11 RX ORDER — ACETAMINOPHEN 500 MG
1000 TABLET ORAL ONCE
Status: DISCONTINUED | OUTPATIENT
Start: 2020-08-11 | End: 2020-08-11 | Stop reason: HOSPADM

## 2020-08-11 RX ORDER — EPHEDRINE SULFATE/0.9% NACL/PF 50 MG/5 ML
SYRINGE (ML) INTRAVENOUS AS NEEDED
Status: DISCONTINUED | OUTPATIENT
Start: 2020-08-11 | End: 2020-08-11 | Stop reason: HOSPADM

## 2020-08-11 RX ORDER — METOPROLOL TARTRATE 25 MG/1
12.5 TABLET, FILM COATED ORAL 2 TIMES DAILY
Status: DISCONTINUED | OUTPATIENT
Start: 2020-08-11 | End: 2020-08-13 | Stop reason: HOSPADM

## 2020-08-11 RX ORDER — MIDAZOLAM HYDROCHLORIDE 1 MG/ML
2 INJECTION, SOLUTION INTRAMUSCULAR; INTRAVENOUS ONCE
Status: DISCONTINUED | OUTPATIENT
Start: 2020-08-11 | End: 2020-08-11 | Stop reason: HOSPADM

## 2020-08-11 RX ORDER — DIPHENHYDRAMINE HCL 25 MG
25 CAPSULE ORAL
Status: DISCONTINUED | OUTPATIENT
Start: 2020-08-11 | End: 2020-08-13 | Stop reason: HOSPADM

## 2020-08-11 RX ORDER — LOSARTAN POTASSIUM 50 MG/1
100 TABLET ORAL DAILY
Status: DISCONTINUED | OUTPATIENT
Start: 2020-08-12 | End: 2020-08-13 | Stop reason: HOSPADM

## 2020-08-11 RX ORDER — ACETAMINOPHEN 650 MG/1
650 SUPPOSITORY RECTAL ONCE
Status: DISCONTINUED | OUTPATIENT
Start: 2020-08-11 | End: 2020-08-11 | Stop reason: ALTCHOICE

## 2020-08-11 RX ORDER — ACETAMINOPHEN 325 MG/1
975 TABLET ORAL ONCE
Status: DISCONTINUED | OUTPATIENT
Start: 2020-08-11 | End: 2020-08-11 | Stop reason: ALTCHOICE

## 2020-08-11 RX ORDER — NALOXONE HYDROCHLORIDE 0.4 MG/ML
0.04 INJECTION, SOLUTION INTRAMUSCULAR; INTRAVENOUS; SUBCUTANEOUS
Status: DISCONTINUED | OUTPATIENT
Start: 2020-08-11 | End: 2020-08-11 | Stop reason: HOSPADM

## 2020-08-11 RX ADMIN — PHENYLEPHRINE HYDROCHLORIDE 100 MCG: 10 INJECTION INTRAVENOUS at 12:13

## 2020-08-11 RX ADMIN — CEFAZOLIN SODIUM 2 G: 100 INJECTION, POWDER, LYOPHILIZED, FOR SOLUTION INTRAVENOUS at 22:24

## 2020-08-11 RX ADMIN — Medication 5 ML: at 22:25

## 2020-08-11 RX ADMIN — SODIUM CHLORIDE, SODIUM LACTATE, POTASSIUM CHLORIDE, AND CALCIUM CHLORIDE: 600; 310; 30; 20 INJECTION, SOLUTION INTRAVENOUS at 12:35

## 2020-08-11 RX ADMIN — Medication 2 G: at 11:35

## 2020-08-11 RX ADMIN — Medication 10 MG: at 12:43

## 2020-08-11 RX ADMIN — ASPIRIN 81 MG: 81 TABLET, COATED ORAL at 22:24

## 2020-08-11 RX ADMIN — Medication 10 MG: at 11:54

## 2020-08-11 RX ADMIN — METOPROLOL TARTRATE 12.5 MG: 25 TABLET, FILM COATED ORAL at 18:00

## 2020-08-11 RX ADMIN — DIPHENHYDRAMINE HYDROCHLORIDE 25 MG: 25 CAPSULE ORAL at 23:56

## 2020-08-11 RX ADMIN — PROPOFOL 50 MG: 10 INJECTION, EMULSION INTRAVENOUS at 11:36

## 2020-08-11 RX ADMIN — PROPOFOL 50 MCG/KG/MIN: 10 INJECTION, EMULSION INTRAVENOUS at 11:36

## 2020-08-11 RX ADMIN — Medication 1 AMPULE: at 22:24

## 2020-08-11 RX ADMIN — DEXAMETHASONE SODIUM PHOSPHATE 4 MG: 10 INJECTION INTRAMUSCULAR; INTRAVENOUS at 12:15

## 2020-08-11 RX ADMIN — SODIUM CHLORIDE, SODIUM LACTATE, POTASSIUM CHLORIDE, AND CALCIUM CHLORIDE 100 ML/HR: 600; 310; 30; 20 INJECTION, SOLUTION INTRAVENOUS at 09:00

## 2020-08-11 RX ADMIN — LIDOCAINE HYDROCHLORIDE 0.1 ML: 10 INJECTION, SOLUTION INFILTRATION; PERINEURAL at 09:13

## 2020-08-11 RX ADMIN — PHENYLEPHRINE HYDROCHLORIDE 50 MCG: 10 INJECTION INTRAVENOUS at 12:29

## 2020-08-11 RX ADMIN — Medication 3 AMPULE: at 09:00

## 2020-08-11 RX ADMIN — Medication 10 ML: at 14:00

## 2020-08-11 RX ADMIN — BUPIVACAINE HYDROCHLORIDE IN DEXTROSE 15 MG: 7.5 INJECTION, SOLUTION SUBARACHNOID at 11:31

## 2020-08-11 RX ADMIN — ACETAMINOPHEN 1000 MG: 500 TABLET, FILM COATED ORAL at 23:56

## 2020-08-11 RX ADMIN — ONDANSETRON 4 MG: 2 INJECTION INTRAMUSCULAR; INTRAVENOUS at 12:16

## 2020-08-11 RX ADMIN — ACETAMINOPHEN 1000 MG: 500 TABLET, FILM COATED ORAL at 17:02

## 2020-08-11 RX ADMIN — PHENYLEPHRINE HYDROCHLORIDE 50 MCG: 10 INJECTION INTRAVENOUS at 12:32

## 2020-08-11 RX ADMIN — Medication 15 MG: at 11:39

## 2020-08-11 RX ADMIN — TRANEXAMIC ACID 1 G: 100 INJECTION, SOLUTION INTRAVENOUS at 11:40

## 2020-08-11 RX ADMIN — Medication 10 MG: at 11:50

## 2020-08-11 NOTE — PERIOP NOTES
TRANSFER - OUT REPORT:    Verbal report given to [place] (name) on Ruben Barillas  being transferred to [facility] (unit) for routine post - op       Report consisted of patients Situation, Background, Assessment and   Recommendations(SBAR). Information from the following report(s) SBAR was reviewed with the receiving nurse. Lines:   Peripheral IV 08/11/20 Left Forearm (Active)   Site Assessment Clean, dry, & intact 08/11/20 1301   Phlebitis Assessment 0 08/11/20 1301   Infiltration Assessment 0 08/11/20 1301   Dressing Status Clean, dry, & intact 08/11/20 1301   Dressing Type Tape;Transparent 08/11/20 1301   Hub Color/Line Status Infusing 08/11/20 1301   Action Taken Blood drawn 08/11/20 7405        Opportunity for questions and clarification was provided.       Patient transported with:   O2 @ 2 liters

## 2020-08-11 NOTE — PROGRESS NOTES
TRANSFER - IN REPORT:    Verbal report received from Kimberly Madrigalrn(name) on Carmelita Felipe  being received from PACU(unit) for routine progression of care      Report consisted of patients Situation, Background, Assessment and   Recommendations(SBAR). Information from the following report(s) SBAR, Kardex, Procedure Summary, Intake/Output, MAR and Recent Results was reviewed with the receiving nurse. Opportunity for questions and clarification was provided. Assessment completed upon patients arrival to unit and care assumed.

## 2020-08-11 NOTE — OP NOTES
Total Hip Procedure Note               Tiff Soares, [id number], 1940    Pre-operative Diagnosis: Unilateral primary osteoarthritis, left hip [M16.12]      Post-operative Diagnosis: Unilateral primary osteoarthritis, left hip [M16.12]     Procedure: Left Total Hip Arthroplasty     BMI: Body mass index is 32.85 kg/m². Kate Dalton Location: [address] [place] [address]      Surgeon: Agapito Mejia MD      Assistant: CALLI Collins    Anesthesia: Spinal  Modifier: 22  BMI:Body mass index is 32.85 kg/m². EBL: 200 cc     Procedure: Total Hip Arthroplasty    The complexity of total joint surgery requires the use of a skilled first assistant for positioning, retraction and assistance in closure. This BMI for this patient is Body mass index is 32.85 kg/m². [facility] BMI's between 30 and 38.9 are considered obese, while a BMI over 39 indicates the patient is morbidly obese. Obese and Morbidly obese patients make exposure and retraction extremely difficult. The patient's size and surgical complexity makes implantation and proper insertion of total joint implants more difficult requiring a skilled first Letty Drake 56 was brought to the operating room and positioned on the operating table. Anesthesia was administered. IV antibiotics were administered, along with IV transexamic acid. A time out identifying the patient, procedure, operative side and surgeon was administered and charted by the OR Nurse. Tiff Soares was positioned on left lateral decubitus position. The limb was prepped and draped in the usual sterile fashion. The Posterior approach was utilized to expose the hip. The incision was carried through the subcutaneous tissue and underlying fascia with hemostasis obtained using the bovie cauterization. A Charmley retractor was inserted. The short external rotators were divided at their insertion. The sciatic nerve was palpated and identified.  Next, a T-shaped capsular incision was accomplished and femoral head was dislocated. The articular surface revealed loss of cartilage, exposed bone and bone spurring. The neck was osteotomized in the appropriate position just above the lesser trochanteric region. The neck cut was measured to be 16 mm. Acetabular retractors were placed and the appropriate capsulotomy performed. Soft tissue was removed from the acetabulum. The acetabular surface revealed loss of cartilage with exposed bone. The acetabulum was sequentially reamed. Cysts were removed and bone graft placed. Using trial components, the acetabulum was sized to a 56 mm Gheens acetabular cup. The acetabular component was impacted to achieve appropriate horizontal tilt, anteversion, coverage, and stability. 1 screw was  used to stabilize the cup. The polyethelene liner was impacted into position. Utilizing the femoral retractor, the canal was prepared with appropriate lateralization with the starter rasp. The canal was then broached progressively. The broach was positioned with the appropriate anatomic femoral anteversion. A calcar planar was utilized. A trial reduction with a +5 neck length was utilized. This was found to be the most stable to flexion greater than 90 degrees and on internal and external rotation. Limb lengths were thought to be equilibrated and with appropriate stability as mentioned above. All trial components were removed. The cementless permanent size 6 high offset ACTIS  was impacted into place. A trial reduction was performed and the above neck length was selected. The permanent Biolox femoral head was impacted into place. Mortimer Shelter Mauldin's hip was reduced and stability was as mentioned above. Copious irrigation was accomplished about the surgical site. The sciatic nerve was palpated and noted to be intact. The capsule was repaired with a #1 PDS and the external rotators were reattached with a #5 Mersilene.      A lavage of diluted betadine solution of 17.5 ml Betadine in 500 of 0.9% Normal Saline was allowed to soak in the wound for 3 minutes after implanting of the prosthesis. The wound was irrigated with Saline again before closure. The joint and skin areas were sprinkled with 1 gm of vancomycin powder. Prior to the final skin closure, full strength betadine was applied to the skin surrounding the skin incision. The operative hip was injected prior to closure for post operative pain management. A #1 PDS suture was used to re-approximate the fascia. 60 cc of transexamic acid was injected under the fascia for hemostasis. Monocryl sutures were used to approximate the subcutaneous layers. Staples were used to reapproximate the skin edges and a sterile bandage was placed. The patient was then rolled to a supine position. The sponge and needle counts were correct. The patient tolerated the procedure without difficulty and left the operating room in satisfactory condition. Implants:   Implant Name Type Inv. Item Serial No.  Lot No. LRB No. Used Action   LINER ACET PINN NEUT [id number] -- ALTRX - [id number]  LINER ACET PINN NEUT [id number] -- ALTRX  [facility] [id number] Left 1 Implanted   CUP ACET MH PINN 56MM GRIPTION --  - [id number]  CUP ACET MH PINN 56MM GRIPTION --   [facility] [id number] Left 1 Implanted   SCREW BONE FEM CANC 6. [id number] - [id number]  SCREW BONE FEM CANC 6. [id number]  [facility] [id number] Left 1 Implanted   STEM FEM TAPR SZ6 HI OFFSET -- ACTIS - [id number]  STEM FEM TAPR SZ6 HI OFFSET -- ACTIS [id number] [facility] [id number] Left 1 Implanted   HEAD FEM 36 ARTICL/EZ+5 BIOLOX - [id number]  HEAD FEM 36 ARTICL/EZ+5 BIOLOX [id number] [facility] [id number] Left 1 Implanted        By: Mehdi Azul MD

## 2020-08-11 NOTE — PROGRESS NOTES
Pt up in recliner tolerating dinner well. Pt has strong push/ pulls to both feet and wiggles all toes well. Pt denies any pain to hip yet. Wife at pt side. Call light in reach. inst both to call for any needs.

## 2020-08-11 NOTE — PROGRESS NOTES
08/11/20 1957   Oxygen Therapy   O2 Sat (%) 96 %   Pulse via Oximetry 95 beats per minute   O2 Device Room air   Incentive Spirometry Treatment   Actual Volume (ml) 2200 ml   Number of Attempts 2   Pt on continuous monitor for HS. Alarm limits set.  Pt working on IS

## 2020-08-11 NOTE — PROGRESS NOTES
08/11/20 1356   Oxygen Therapy   O2 Sat (%) 100 %   Pulse via Oximetry 74 beats per minute   O2 Device Room air   O2 Flow Rate (L/min) 0 l/min   Incentive Spirometry Treatment   Actual Volume (ml) 2250 ml   Patient encouraged to do 10 breaths every hour while awake-patient agreed and demonstrated. No shortness of breath or distress noted. BS are clear b/l. [facility] notes reviewed- Sat monitor ordered HS.

## 2020-08-11 NOTE — ANESTHESIA PROCEDURE NOTES
Spinal Block    Performed by: Liam Martinez MD  Authorized by: Liam Martinez MD     Pre-procedure: Indications: primary anesthetic  Preanesthetic Checklist: patient identified, risks and benefits discussed, anesthesia consent, patient being monitored and timeout performed    Timeout Time: 11:27  Preanesthetic Checklist comment:  Risk of nerve damage discussed.     Spinal Block:   Patient Position:  Seated  Prep Region:  Lumbar  Prep: chlorhexidine and patient draped      Location:  L3-4  Technique:  Single shot    Local Dose (mL):  3    Needle:   Needle Type:  Pencan  Needle Gauge:  25 G  Attempts:  1      Events: CSF confirmed, no blood with aspiration and no paresthesia        Assessment:  Insertion:  Uncomplicated  Patient tolerance:  Patient tolerated the procedure well with no immediate complications

## 2020-08-11 NOTE — PROGRESS NOTES
TRANSFER - IN REPORT:    Verbal report received from john RN (name) on Tiff Ing  being received from [facility] ortho (unit) for ordered procedure      Report consisted of patients Situation, Background, Assessment and   Recommendations(SBAR). Information from the following report(s) SBAR, Kardex and MAR was reviewed with the receiving nurse. Opportunity for questions and clarification was provided. Assessment completed upon patients arrival to unit and care assumed.

## 2020-08-11 NOTE — H&P
[address] [facility]  History and Physical Exam    Patient ID:  Gifty Quezada  [id number]    80 y.o.  1940    Today: August 11, 2020    Vitals Signs: Reviewed as noted in medical record. Allergies: No Known Allergies    CC: Left hip pain    HPI:  Pt complains of left hip pain and difficulty ambulating. Relevant PMH:   Past Medical History:   Diagnosis Date    Arrhythmia     AFlutter/SVT    Arthritis     CAD (coronary artery disease) 2010    2 xstents, Followed by Dr. Zoe Holcomb Coagulation disorder [facility])     eliquis/plavix    Coronary atherosclerosis of native coronary artery 5/27/2010    2010:  PCI LAD Xience x 2    Diabetes ([address] type 2    Controlled with diet     Dyslipidemia 5/27/2010    Essential hypertension, benign      History of coronary artery stent placement 2010    LAC, LAD stented    History of kidney stones     History of prior ablation treatment 2014    due to atrial fib    Kidney disease     Was seen by [place] [facility] after sepsis for acute Kidney disease, has since been released    Paroxysmal atrial fibrillation ([address] 10/2/2015    Platelet inhibition due to Plavix     Post PTCA     WITH STENT    Sepsis ([address]     Shingles outbreak     Syncope and collapse 07/25/2014       Objective:                    HEENT: NC/AT                   Lungs:  clear                   Heart:   rrr                   Abdomen: soft                   Extremities:  Pain with rom of the left hip joint    Radiographs: reveal osteoarthritis with loss of joint space and bone spurs. Assessment: Unilateral primary osteoarthritis, left hip [M16.12]    Plan:  Proceed with scheduled Procedure(s) (LRB):  LEFT HIP ARTHROPLASTY TOTAL/ DEPUY (Left) . The patient has failed conservative treatment including NSAIDS, and injections. Due to the amount of pain the patient is experiencing we will proceed with scheduled procedure.   It is also felt that the patient is high risk for postoperative complication due to history of multiple chronic medical problems.   The patient may potentially spend 2 nights in the hospital.      Signed By: Olinda Colvin  August 11, 2020

## 2020-08-11 NOTE — PROGRESS NOTES
Care Management Interventions  PCP Verified by CM: Yes  Mode of Transport at Discharge: Self  Transition of Care Consult (CM Consult): [address]: Yes  Discharge Durable Medical Equipment: Yes  Physical Therapy Consult: Yes  Occupational Therapy Consult: Yes  Current Support Network: Lives with Spouse  Confirm Follow Up Transport: Family  The Plan for Transition of Care is Related to the Following Treatment Goals : return to independent function  The Patient and/or Patient Representative was Provided with a Choice of Provider and Agrees with the Discharge Plan?: Yes  Freedom of Choice List was Provided with Basic Dialogue that Supports the Patient's Individualized Plan of Care/Goals, Treatment Preferences and Shares the Quality Data Associated with the Providers?: Yes  Discharge Location  Discharge Placement: Home with home health    Patient is a [de-identified]y.o. year old male admitted for Left ESTELLE . Patient plans to return home on discharge. Order received to arrange home health. Patient without preference towards agency. Referral sent to [facility]. HH aware Patient requesting we arrange a walker and bedside commode. Referral sent to [address] [place] [address] Str. delivered to the hospital room prior to discharge. Will follow until discharge.

## 2020-08-11 NOTE — PERIOP NOTES
TRANSFER - OUT REPORT:    Verbal report given to Niall Rodriguez RN on Debera Degree  being transferred to Pre op for routine progression of care       Report consisted of patients Situation, Background, Assessment and   Recommendations(SBAR). Information from the following report(s) SBAR, Kardex, [facility] and Recent Results was reviewed with the receiving nurse. Lines:   Peripheral IV 08/11/20 Left Forearm (Active)   Site Assessment Clean, dry, & intact 08/11/20 0904   Phlebitis Assessment 0 08/11/20 0904   Infiltration Assessment 0 08/11/20 0904   Dressing Status Clean, dry, & intact 08/11/20 0904   Dressing Type Tape;Transparent 08/11/20 0904   Hub Color/Line Status Infusing 08/11/20 0904   Action Taken Blood drawn 08/11/20 8262        Opportunity for questions and clarification was provided.       Patient transported with:   [employer]

## 2020-08-11 NOTE — PROGRESS NOTES
Problem: Self Care Deficits Care Plan (Adult)  Goal: *Acute Goals and Plan of Care (Insert Text)  Description: GOALS:   DISCHARGE GOALS (in preparation for going home/rehab):  3 days  1. Mr. Girish Pond will perform one lower body dressing activity with minimal assistance with adaptive equipment to demonstrate improved functional mobility and safety. 2.  Mr. Girish Pond will perform one lower body bathing activity with minimal  assistance with adaptive equipment to demonstrate improved functional mobility and safety. 3.  Mr. Girish Pond will perform toileting/toilet transfer with contact guard assistance with adaptive equipment to demonstrate improved functional mobility and safety. 4.  Mr. Girish Pond will perform shower transfer with contact guard assistance with adaptive equipment to demonstrate improved functional mobility and safety. 5.  Mr. Girish Pond will state ESTELLE precautions with two verbal cues to demonstrate improved functional mobility and safety. JOINT CAMP OCCUPATIONAL THERAPY ESTELLE: Initial Assessment and Daily Note 8/11/2020  INPATIENT: Hospital Day: 1  [place] / [place] / Product Type: Medicare /      NAME/AGE/GENDER: Eliane Patiño is a [de-identified] y.o. male   PRIMARY DIAGNOSIS:  Unilateral primary osteoarthritis, left hip [M16.12]   Procedure(s) and Anesthesia Type:     * LEFT HIP ARTHROPLASTY TOTAL/ DEPUY - Spinal (Left)  ICD-10: Treatment Diagnosis:    Pain in left hip (M25.552)  Stiffness of Left Hip, Not elsewhere classified (L58.001)      ASSESSMENT:     Mr. Girish Pond is s/p Left ESTELLE and presents with decreased weight bearing on L LE and decreased independence with functional mobility and activities of daily living as compared to prior level of function and safety. Patient would benefit from skilled Occupational Therapy to maximize independence and safety with self-care task and functional mobility.   Pt would also benefit from education on lower body adaptive equipment and hip precautions post-surgery in preparation for going home. Patient able to don clothes at edge of bed with assist. SPT from bed to recliner using a rolling walker. Should progress well with ADL's tomorrow. This section established at most recent assessment   PROBLEM LIST (Impairments causing functional limitations):  Decreased Strength  Decreased ADL/Functional Activities  Decreased Transfer Abilities  Increased Pain  Increased Fatigue  Decreased Flexibility/Joint Mobility  Decreased Knowledge of Precautions   INTERVENTIONS PLANNED: (Benefits and precautions of occupational therapy have been discussed with the patient.)  Activities of daily living training  Adaptive equipment training  Balance training  Clothing management  Donning&doffing training  Theraputic activity     TREATMENT PLAN: Frequency/Duration: Follow patient 1-2tx to address above goals. Rehabilitation Potential For Stated Goals: Excellent     RECOMMENDED REHABILITATION/EQUIPMENT: (at time of discharge pending progress): Continue Skilled Therapy. OCCUPATIONAL PROFILE AND HISTORY:   History of Present Injury/Illness (Reason for Referral): Pt presents this date s/p (Left) ESTELLE. Past Medical History/Comorbidities:   Mr. CHICASΤΟΚΟΠΟTRIPP  has a past medical history of Arrhythmia, Arthritis, CAD (coronary artery disease) (2010), Coagulation disorder ([address] Coronary atherosclerosis of native coronary artery (5/27/2010), Diabetes ([address] (type 2), Dyslipidemia (5/27/2010), Essential hypertension, benign ( ), History of coronary artery stent placement (2010), History of kidney stones, History of prior ablation treatment (2014), Kidney disease, Paroxysmal atrial fibrillation ([address] (10/2/2015), Platelet inhibition due to Plavix, Post PTCA, Sepsis ([address] Shingles outbreak, and Syncope and collapse (07/25/2014). He also has no past medical history of Coagulation defects or Unspecified adverse effect of anesthesia.   Mr. CHICASΤΟΚΟΠΟTRIPP  has a past surgical history that includes hx cyst removal (years ago); hx heart catheterization (2010); pr cardiac surg procedure unlist (2014); and hx cataract removal (Bilateral). Social History/Living Environment:   Home Environment: Private residence  One/Two Story Residence: One story  Living Alone: No  Support Systems: Spouse/Significant Other/Partner  Patient Expects to be Discharged to[de-identified] Private residence  Current DME Used/Available at Home: None  Prior Level of Function/Work/Activity:  Independent prior. [employer]     Number of Personal Factors/Comorbidities that affect the Plan of Care: Brief history (0):  LOW COMPLEXITY   ASSESSMENT OF OCCUPATIONAL PERFORMANCE[de-identified]   Most Recent Physical Functioning:   Balance  Sitting: Intact  Standing: Pull to stand; With support       Gross Assessment  AROM: Generally decreased, functional(right LE)  Strength: Generally decreased, functional(right LE)          LLE AROM  L Hip Flexion: 80  L Hip ABduction: 10 Coordination  Fine Motor Skills-Upper: Left Intact; Right Intact  Gross Motor Skills-Upper: Left Intact; Right Intact         Mental Status  Neurologic State: Alert  Orientation Level: Oriented X4  Cognition: Appropriate decision making  Perception: Appears intact                Basic ADLs (From Assessment) Complex ADLs (From Assessment)   Basic ADL  Feeding: Independent  Oral Facial Hygiene/Grooming: Setup  Bathing: Minimum assistance  Upper Body Dressing: Setup  Lower Body Dressing: Moderate assistance  Toileting: Moderate assistance     Grooming/Bathing/Dressing Activities of Daily Living                       Functional Transfers  Toilet Transfer : Moderate assistance  Shower Transfer:  Moderate assistance     Bed/Mat Mobility  Supine to Sit: Minimum assistance  Sit to Stand: Minimum assistance;Assist x2  Stand to Sit: Assist x2;Minimum assistance  Bed to Chair: Assist x2;Minimum assistance         Physical Skills Involved:  Range of Motion  Balance  Strength Cognitive Skills Affected (resulting in the inability to perform in a timely and safe manner):  [facility] Psychosocial Skills Affected:  WFL   Number of elements that affect the Plan of Care: 1-3:  LOW COMPLEXITY   CLINICAL DECISION MAKIN[address] [place] [address] AM-PAC 6 Clicks   Daily Activity Inpatient Short Form  How much help from another person does the patient currently need. .. Total A Lot A Little None   1. Putting on and taking off regular lower body clothing? [] 1   [x] 2   [] 3   [] 4   2. Bathing (including washing, rinsing, drying)? [] 1   [x] 2   [] 3   [] 4   3. Toileting, which includes using toilet, bedpan or urinal?   [] 1   [x] 2   [] 3   [] 4   4. Putting on and taking off regular upper body clothing? [] 1   [] 2   [] 3   [x] 4   5. Taking care of personal grooming such as brushing teeth? [] 1   [] 2   [] 3   [x] 4   6. Eating meals? [] 1   [] 2   [] 3   [x] 4   © , Trustees of [address] [place] [address], under license to [employer]. All rights reserved     Score:  Initial: 18 Most Recent: X (Date: -- )    Interpretation of Tool:  Represents activities that are increasingly more difficult (i.e. Bed mobility, Transfers, Gait). Medical Necessity:     Skilled intervention continues to be required due to Deficits noted abvoe. Reason for Services/Other Comments:  Patient continues to require skilled intervention due to   New ESTELLE  . Use of outcome tool(s) and clinical judgement create a POC that gives a: MODERATE COMPLEXITY            TREATMENT:   (In addition to Assessment/Re-Assessment sessions the following treatments were rendered)     Pre-treatment Symptoms/Complaints:    Pain: Initial:   Pain Intensity 1: 0  Post Session:  0     Self Care: (10): Procedure(s) (per grid) utilized to improve and/or restore self-care/home management as related to dressing, toileting, and grooming. Required minimal verbal and tactile cueing to facilitate activities of daily living skills. Initial evaluation 10 minutes. Treatment/Session Assessment:     Response to Treatment:  Good, sitting up in recliner. Education:  [] Home Exercises  [x] Fall Precautions  [x] Hip Precautions [] Going Home Video  [] Knee/Hip Prosthesis Review  [x] Walker Management/Safety [x] Adaptive Equipment as Needed       Interdisciplinary Collaboration:   Physical Therapist  Occupational Therapist  Registered Nurse    After treatment position/precautions:   Up in chair  Bed/Chair-wheels locked  Caregiver at bedside  Call light within reach  RN notified     Compliance with Program/Exercises: Compliant all of the time, Will assess as treatment progresses. Recommendations/Intent for next treatment session:  Treatment next visit will focus on increasing Mr. Minayas independence with bed mobility, transfers, self care, functional mobility, modalities for pain, and patient education.       Total Treatment Duration:  OT Patient Time In/Time Out  Time In: 1510  Time Out: [address],

## 2020-08-11 NOTE — ANESTHESIA PREPROCEDURE EVALUATION
Relevant Problems   No relevant active problems       Anesthetic History   No history of anesthetic complications            Review of Systems / Medical History  Pertinent labs reviewed    Pulmonary          Undiagnosed apnea         Neuro/Psych   Within defined limits           Cardiovascular    Hypertension        Dysrhythmias (s/p SVT/A flutter ablation 2015) : atrial fibrillation, SVT and atrial flutter  CAD and cardiac stents (2010.)    Exercise tolerance: <4 METS: Uses cane due to hip, otherwise ambulates quite well     GI/Hepatic/Renal         Renal disease: stones and CRI       Endo/Other    Diabetes (diet controlled)    Obesity and arthritis     Other Findings            Physical Exam    Airway  Mallampati: II  TM Distance: 4 - 6 cm  Neck ROM: normal range of motion   Mouth opening: Normal     Cardiovascular    Rhythm: irregular  Rate: normal         Dental    Dentition: Full upper dentures     Pulmonary  Breath sounds clear to auscultation               Abdominal  GI exam deferred       Other Findings            Anesthetic Plan    ASA: 3  Anesthesia type: spinal          Induction: Intravenous  Anesthetic plan and risks discussed with: Patient      Off eliquis 7 days.

## 2020-08-11 NOTE — ANESTHESIA POSTPROCEDURE EVALUATION
Procedure(s):  LEFT HIP ARTHROPLASTY TOTAL/ DEPUY.     spinal    Anesthesia Post Evaluation        Patient location during evaluation: PACU  Patient participation: complete - patient participated  Level of consciousness: awake  Pain management: satisfactory to patient  Airway patency: patent  Anesthetic complications: no  Cardiovascular status: hemodynamically stable  Respiratory status: spontaneous ventilation  Hydration status: euvolemic  Post anesthesia nausea and vomiting:  none      INITIAL Post-op Vital signs:   Vitals Value Taken Time   /58 8/11/2020  1:15 PM   Temp 36.7 °C (98.1 °F) 8/11/2020  1:01 PM   Pulse 85 8/11/2020  1:15 PM   Resp 18 8/11/2020  1:15 PM   SpO2 98 % 8/11/2020  1:15 PM

## 2020-08-11 NOTE — PROGRESS NOTES
Problem: Mobility Impaired (Adult and Pediatric)  Goal: *Acute Goals and Plan of Care (Insert Text)  Outcome: Progressing Towards Goal  Note: GOALS (1-4 days):  (1.)Mr. MCNEIL will move from supine to sit and sit to supine  in bed with STAND BY ASSIST.    (2.)Mr. MCNEIL will transfer from bed to chair and chair to bed with STAND BY ASSIST using the least restrictive device. (3.)Mr. MCNEIL will ambulate with STAND BY ASSIST for 150 feet with the least restrictive device. (4.)Mr. MCNEIL will ambulate up/down 1 steps with left railing with MINIMAL ASSIST with device as needed. (5.)Mr. MCNEIL will state/observe ESTELLE precautions with 0 verbal cues. ________________________________________________________________________________________________       PHYSICAL THERAPY JOINT CAMP ESTELLE: Initial Assessment and PM 8/11/2020  INPATIENT: Hospital Day: 1  [place] / [place] / Product Type: Medicare /      NAME/AGE/GENDER: Emiliana Torres is a [de-identified] y.o. male   PRIMARY DIAGNOSIS:  Unilateral primary osteoarthritis, left hip [M16.12]   Procedure(s) and Anesthesia Type:     * LEFT HIP ARTHROPLASTY TOTAL/ DEPUY - Spinal (Left)  ICD-10: Treatment Diagnosis:    Pain in left hip (M25.552)  Stiffness of Left Hip, Not elsewhere classified (M25.652)  Difficulty in walking, Not elsewhere classified (R26.2)      ASSESSMENT:     Mr. MCNEIL presents with decreased rom and strength of left LE as well as decreased functional mobility and gait s/p left estelle. He plans to go home with HHPT.     This section established at most recent assessment   PROBLEM LIST (Impairments causing functional limitations):  Decreased Strength  Decreased ADL/Functional Activities  Decreased Transfer Abilities  Decreased Ambulation Ability/Technique  Decreased Balance  Increased Pain  Decreased Activity Tolerance  Decreased Flexibility/Joint Mobility  Decreased [place] with Home Exercise Program  Decreased strength INTERVENTIONS PLANNED: (Benefits and precautions of physical therapy have been discussed with the patient.)  bed mobility  gait training  home exercise program (HEP)  Range of Motion: active/assisted/passive  Therapeutic Activities  therapeutic exercise/strengthening  transfer training  Group Therapy     TREATMENT PLAN: Frequency/Duration: Follow patient BID for duration of hospital stay to address above goals. Rehabilitation Potential For Stated Goals: Good     RECOMMENDED REHABILITATION/EQUIPMENT: (at time of discharge pending progress): Continue Skilled Therapy and Home Health: Physical Therapy. HISTORY:   History of Present Injury/Illness (Reason for Referral):  S/p left mehdi  Past Medical History/Comorbidities:   Mr. STOVERΚΟΠΟTRIPP  has a past medical history of Arrhythmia, Arthritis, CAD (coronary artery disease) (2010), Coagulation disorder ([address] Coronary atherosclerosis of native coronary artery (5/27/2010), Diabetes ([address] (type 2), Dyslipidemia (5/27/2010), Essential hypertension, benign ( ), History of coronary artery stent placement (2010), History of kidney stones, History of prior ablation treatment (2014), Kidney disease, Paroxysmal atrial fibrillation ([address] (10/2/2015), Platelet inhibition due to Plavix, Post PTCA, Sepsis ([address] Shingles outbreak, and Syncope and collapse (07/25/2014). He also has no past medical history of Coagulation defects or Unspecified adverse effect of anesthesia. Mr. GARIBAYΟΚΟΠΟTRIPP  has a past surgical history that includes hx cyst removal (years ago); hx heart catheterization (2010); pr cardiac surg procedure unlist (2014); and hx cataract removal (Bilateral).   Social History/Living Environment:   Home Environment: Other (comment)  One/Two Story Residence: One story  Living Alone: No  Support Systems: Spouse/Significant Other/Partner  Patient Expects to be Discharged to[de-identified] Unknown  Current DME Used/Available at Home: None  Prior Level of Function/Work/Activity:  independent Number of Personal Factors/Comorbidities that affect the Plan of Care: 1-2: MODERATE COMPLEXITY   EXAMINATION:   Most Recent Physical Functioning:      Gross Assessment  AROM: Generally decreased, functional(right LE)  Strength: Generally decreased, functional(right LE)          LLE AROM  L Hip Flexion: 80  L Hip ABduction: 10          Bed Mobility  Supine to Sit: Minimum assistance    Transfers  Sit to Stand: Minimum assistance;Assist x2  Stand to Sit: Assist x2;Minimum assistance  Bed to Chair: Assist x2;Minimum assistance    Balance  Sitting: Intact  Standing: Pull to stand; With support              Weight Bearing Status  Left Side Weight Bearing: As tolerated  Distance (ft): 5 Feet (ft)  Ambulation - Level of Assistance: Additional time;Assist x2;Minimal assistance  Assistive Device: Walker, rolling  Speed/Katelyn: Delayed  Step Length: Left shortened;Right shortened  Stance: Left decreased  Gait Abnormalities: Antalgic  Interventions: Verbal cues; Tactile cues;Manual cues     Braces/Orthotics:     Left Hip Cold  Type: Cold/ice pack      Body Structures Involved:  Bones  Joints  Muscles  Ligaments Body Functions Affected: Movement Related Activities and Participation Affected: Mobility   Number of elements that affect the Plan of Care: 3: MODERATE COMPLEXITY   CLINICAL PRESENTATION:   Presentation: Stable and uncomplicated: LOW COMPLEXITY   CLINICAL DECISION MAKIN[address] [place] [address] AM-PAC 6 Clicks   Basic Mobility Inpatient Short Form  How much difficulty does the patient currently have. .. Unable A Lot A Little None   1. Turning over in bed (including adjusting bedclothes, sheets and blankets)? [] 1   [] 2   [x] 3   [] 4   2. Sitting down on and standing up from a chair with arms ( e.g., wheelchair, bedside commode, etc.)   [] 1   [] 2   [x] 3   [] 4   3. Moving from lying on back to sitting on the side of the bed?    [] 1   [] 2   [x] 3   [] 4   How much help from another person does the patient currently need. .. Total A Lot A Little None   4. Moving to and from a bed to a chair (including a wheelchair)? [] 1   [] 2   [x] 3   [] 4   5. Need to walk in hospital room? [] 1   [x] 2   [] 3   [] 4   6. Climbing 3-5 steps with a railing? [] 1   [x] 2   [] 3   [] 4   © 2007, Trustees of [address] [place] [address] [address] [id number], under license to [employer]. All rights reserved     Score:  Initial: 16 Most Recent: X (Date: -- )    Interpretation of Tool:  Represents activities that are increasingly more difficult (i.e. Bed mobility, Transfers, Gait). Medical Necessity:     Patient is expected to demonstrate progress in   strength, range of motion, and balance   to   decrease assistance required with exercises and functional mobility   . Reason for Services/Other Comments:  Patient   continues to require present interventions due to patient's inability to perform exercises and functional mobility independently  . Use of outcome tool(s) and clinical judgement create a POC that gives a: Clear prediction of patient's progress: LOW COMPLEXITY            TREATMENT:   (In addition to Assessment/Re-Assessment sessions the following treatments were rendered)     Pre-treatment Symptoms/Complaints:  none  Pain Initial:      Post Session:  0     Therapeutic Exercise: (10 Minutes):  Exercises per grid below to improve mobility and strength. Required minimal visual, verbal, and manual cues to promote proper body alignment, promote proper body posture, and promote proper body mechanics. Progressed range and repetitions as indicated.      Date:  8/11 Date:   Date:     ACTIVITY/EXERCISE AM PM AM PM AM PM   GROUP THERAPY  []  []  []  []  []  []   Ankle Pumps  10a       Quad Sets  10a       Gluteal Sets  10a       Hip ABd/ADduction  10aa       Straight Leg Raises         Knee Slides  10aa       Short Arc Quads         Long Arc Quads         Chair Slides                  B = bilateral; AA = active assistive; A = active; P = passive Treatment/Session Assessment:     Response to Treatment:  did fine, some LE numbness limiting gait. Education:  [x] Home Exercises  [x] Fall Precautions  [x] Hip Precautions [] D/C Instruction Review  [x] Hip Prosthesis Review  [x] Walker Management/Safety [] Adaptive Equipment as Needed       Interdisciplinary Collaboration:   Occupational Therapist  Registered Nurse    After treatment position/precautions:   Up in chair  Bed/Chair-wheels locked  Bed in low position  Caregiver at bedside  Call light within reach  Family at bedside    Compliance with Program/Exercises: Will assess as treatment progresses. Recommendations/Intent for next treatment session:  Treatment next visit will focus on increasing Mr. Minayas independence with bed mobility, transfers, gait training, strength/ROM exercises, modalities for pain, and patient education.       Total Treatment Duration:  PT Patient Time In/Time Out  Time In: [address]  Time Out: [facility], PT

## 2020-08-12 LAB — HGB BLD-MCNC: 14.8 G/DL (ref 13.6–17.2)

## 2020-08-12 PROCEDURE — 97535 SELF CARE MNGMENT TRAINING: CPT

## 2020-08-12 PROCEDURE — 74011000250 HC RX REV CODE- 250: Performed by: PHYSICIAN ASSISTANT

## 2020-08-12 PROCEDURE — 65270000029 HC RM PRIVATE

## 2020-08-12 PROCEDURE — 97116 GAIT TRAINING THERAPY: CPT

## 2020-08-12 PROCEDURE — 97110 THERAPEUTIC EXERCISES: CPT

## 2020-08-12 PROCEDURE — 74011250636 HC RX REV CODE- 250/636: Performed by: PHYSICIAN ASSISTANT

## 2020-08-12 PROCEDURE — 77030040830 HC CATH URETH FOL MDII -A

## 2020-08-12 PROCEDURE — 77030019905 HC CATH URETH INTMIT MDII -A

## 2020-08-12 PROCEDURE — 36415 COLL VENOUS BLD VENIPUNCTURE: CPT

## 2020-08-12 PROCEDURE — 51798 US URINE CAPACITY MEASURE: CPT

## 2020-08-12 PROCEDURE — 85018 HEMOGLOBIN: CPT

## 2020-08-12 PROCEDURE — 74011250637 HC RX REV CODE- 250/637: Performed by: PHYSICIAN ASSISTANT

## 2020-08-12 RX ORDER — ASPIRIN 81 MG/1
81 TABLET ORAL EVERY 12 HOURS
Qty: 20 TAB | Refills: 0 | Status: SHIPPED | OUTPATIENT
Start: 2020-08-12

## 2020-08-12 RX ORDER — OXYCODONE HYDROCHLORIDE 10 MG/1
10 TABLET ORAL
Qty: 50 TAB | Refills: 0 | Status: SHIPPED | OUTPATIENT
Start: 2020-08-12 | End: 2020-08-15

## 2020-08-12 RX ADMIN — Medication 1 AMPULE: at 08:52

## 2020-08-12 RX ADMIN — ACETAMINOPHEN 1000 MG: 500 TABLET, FILM COATED ORAL at 17:07

## 2020-08-12 RX ADMIN — LOSARTAN POTASSIUM 100 MG: 50 TABLET, FILM COATED ORAL at 08:52

## 2020-08-12 RX ADMIN — ACETAMINOPHEN 1000 MG: 500 TABLET, FILM COATED ORAL at 12:18

## 2020-08-12 RX ADMIN — DOCUSATE SODIUM 50MG AND SENNOSIDES 8.6MG 2 TABLET: 8.6; 5 TABLET, FILM COATED ORAL at 08:52

## 2020-08-12 RX ADMIN — METOPROLOL TARTRATE 12.5 MG: 25 TABLET, FILM COATED ORAL at 08:52

## 2020-08-12 RX ADMIN — Medication 1 AMPULE: at 21:29

## 2020-08-12 RX ADMIN — HYDROCHLOROTHIAZIDE 12.5 MG: 12.5 CAPSULE ORAL at 08:52

## 2020-08-12 RX ADMIN — OXYCODONE 10 MG: 5 TABLET ORAL at 05:30

## 2020-08-12 RX ADMIN — ASPIRIN 81 MG: 81 TABLET, COATED ORAL at 21:29

## 2020-08-12 RX ADMIN — ACETAMINOPHEN 1000 MG: 500 TABLET, FILM COATED ORAL at 05:30

## 2020-08-12 RX ADMIN — METOPROLOL TARTRATE 12.5 MG: 25 TABLET, FILM COATED ORAL at 17:07

## 2020-08-12 RX ADMIN — ASPIRIN 81 MG: 81 TABLET, COATED ORAL at 08:53

## 2020-08-12 RX ADMIN — Medication 10 ML: at 14:00

## 2020-08-12 RX ADMIN — Medication 5 ML: at 21:36

## 2020-08-12 RX ADMIN — Medication 5 ML: at 05:30

## 2020-08-12 RX ADMIN — CEFAZOLIN SODIUM 2 G: 100 INJECTION, POWDER, LYOPHILIZED, FOR SOLUTION INTRAVENOUS at 05:31

## 2020-08-12 NOTE — PROGRESS NOTES
Shift assessment completed via flow sheet. Pt a/o x 4. Resting quietly in recliner. Assisted pt to bed. No signs of distress or c/o pain at this time. Safety measures in place. Instructed pt to call for assistance. Will continue to monitor.

## 2020-08-12 NOTE — PROGRESS NOTES
Problem: Mobility Impaired (Adult and Pediatric)  Goal: *Acute Goals and Plan of Care (Insert Text)  Outcome: Progressing Towards Goal  Note: GOALS (1-4 days):  (1.)Mr. MCNEIL will move from supine to sit and sit to supine  in bed with STAND BY ASSIST.    (2.)Mr. MCNEIL will transfer from bed to chair and chair to bed with STAND BY ASSIST using the least restrictive device. (3.)Mr. MCNEIL will ambulate with STAND BY ASSIST for 150 feet with the least restrictive device. (4.)Mr. MCNEIL will ambulate up/down 1 steps with left railing with MINIMAL ASSIST with device as needed. (5.)Mr. MCNEIL will state/observe ESTELLE precautions with 0 verbal cues. ________________________________________________________________________________________________       PHYSICAL THERAPY JOINT CAMP ESTELLE: Daily Note and AM 8/12/2020  INPATIENT: Hospital Day: 2  [place] / Product Type: Medicare /      NAME/AGE/GENDER: Elenita Nelson is a [de-identified] y.o. male   PRIMARY DIAGNOSIS:  Unilateral primary osteoarthritis, left hip [M16.12]   Procedure(s) and Anesthesia Type:     * LEFT HIP ARTHROPLASTY TOTAL/ DEPUY - Spinal (Left)  ICD-10: Treatment Diagnosis:    · Pain in left hip (M25.552)  · Stiffness of Left Hip, Not elsewhere classified (M25.652)  · Difficulty in walking, Not elsewhere classified (R26.2)      ASSESSMENT:     Mr. MCNEIL presents with decreased rom and strength of left LE as well as decreased functional mobility and gait s/p left estelle. He plans to go home with HHPT.  8/12/20:  Patient participated well. Increased gait distance with intermittent cues to stand closer to the walker. Plans to go home tomorrow. This section established at most recent assessment   PROBLEM LIST (Impairments causing functional limitations):  1. Decreased Strength  2. Decreased ADL/Functional Activities  3. Decreased Transfer Abilities  4. Decreased Ambulation Ability/Technique  5.  Decreased Balance  6. Increased Pain  7. Decreased Activity Tolerance  8. Decreased Flexibility/Joint Mobility  9. Decreased [place] with Home Exercise Program  10. Decreased strength   INTERVENTIONS PLANNED: (Benefits and precautions of physical therapy have been discussed with the patient.)  1. bed mobility  2. gait training  3. home exercise program (HEP)  4. Range of Motion: active/assisted/passive  5. Therapeutic Activities  6. therapeutic exercise/strengthening  7. transfer training  8. Group Therapy     TREATMENT PLAN: Frequency/Duration: Follow patient BID for duration of hospital stay to address above goals. Rehabilitation Potential For Stated Goals: Good     RECOMMENDED REHABILITATION/EQUIPMENT: (at time of discharge pending progress): Continue Skilled Therapy and Home Health: Physical Therapy. HISTORY:   History of Present Injury/Illness (Reason for Referral):  S/p left mehdi  Past Medical History/Comorbidities:   Mr. Jessie Yanes  has a past medical history of Arrhythmia, Arthritis, CAD (coronary artery disease) (2010), Coagulation disorder ([address] Coronary atherosclerosis of native coronary artery (5/27/2010), Diabetes ([address] (type 2), Dyslipidemia (5/27/2010), Essential hypertension, benign ( ), History of coronary artery stent placement (2010), History of kidney stones, History of prior ablation treatment (2014), Kidney disease, Paroxysmal atrial fibrillation ([address] (10/2/2015), Platelet inhibition due to Plavix, Post PTCA, Sepsis ([address] Shingles outbreak, and Syncope and collapse (07/25/2014). He also has no past medical history of Coagulation defects or Unspecified adverse effect of anesthesia. Mr. Jessie Yanes  has a past surgical history that includes hx cyst removal (years ago); hx heart catheterization (2010); pr cardiac surg procedure unlist (2014); and hx cataract removal (Bilateral).   Social History/Living Environment:   Home Environment: Private residence  One/Two Story Residence: One story  Living Alone: No  Support Systems: Spouse/Significant Other/Partner  Patient Expects to be Discharged to[de-identified] Private residence  Current DME Used/Available at Home: None  Prior Level of Function/Work/Activity:  independent   Number of Personal Factors/Comorbidities that affect the Plan of Care: 1-2: MODERATE COMPLEXITY   EXAMINATION:   Most Recent Physical Functioning:      Gross Assessment  AROM: Generally decreased, functional  Strength: Generally decreased, functional                LLE Strength  L Hip Flexion: 2+  L Hip ABduction: 2+  L Knee Flexion: 2+  L Knee Extension: 3    Bed Mobility  Supine to Sit: Minimum assistance    Transfers  Sit to Stand: Contact guard assistance  Stand to Sit: Contact guard assistance  Bed to Chair: Contact guard assistance    Balance  Sitting: Intact  Standing: With support              Weight Bearing Status  Left Side Weight Bearing: As tolerated  Distance (ft): 115 Feet (ft)  Ambulation - Level of Assistance: Contact guard assistance  Assistive Device: Walker, rolling  Base of Support: Center of gravity altered  Speed/Katelyn: Pace decreased (<100 feet/min)  Step Length: Left shortened;Right shortened  Stance: Left decreased  Gait Abnormalities: Antalgic  Interventions: Safety awareness training;Verbal cues     Braces/Orthotics:            Body Structures Involved:  1. Bones  2. Joints  3. Muscles  4. Ligaments Body Functions Affected:  1. Movement Related Activities and Participation Affected:  1. Mobility   Number of elements that affect the Plan of Care: 3: MODERATE COMPLEXITY   CLINICAL PRESENTATION:   Presentation: Stable and uncomplicated: LOW COMPLEXITY   [facility] [address] [address] AM-PAC 6 Clicks   Basic Mobility Inpatient Short Form  How much difficulty does the patient currently have. .. Unable A Lot A Little None   1. Turning over in bed (including adjusting bedclothes, sheets and blankets)? [] 1   [] 2   [x] 3   [] 4   2.   Sitting down on and standing up from a chair with arms ( e.g., wheelchair, bedside commode, etc.)   [] 1   [] 2   [x] 3   [] 4   3. Moving from lying on back to sitting on the side of the bed? [] 1   [] 2   [x] 3   [] 4   How much help from another person does the patient currently need. .. Total A Lot A Little None   4. Moving to and from a bed to a chair (including a wheelchair)? [] 1   [] 2   [x] 3   [] 4   5. Need to walk in hospital room? [] 1   [x] 2   [] 3   [] 4   6. Climbing 3-5 steps with a railing? [] 1   [x] 2   [] 3   [] 4   © 2007, Trustees of [facility] [employer], under license to [employer]. All rights reserved     Score:  Initial: 16 Most Recent: X (Date: -- )    Interpretation of Tool:  Represents activities that are increasingly more difficult (i.e. Bed mobility, Transfers, Gait). Medical Necessity:     · Patient is expected to demonstrate progress in   · strength, range of motion, and balance  ·  to   · decrease assistance required with exercises and functional mobility   · . Reason for Services/Other Comments:  · Patient   · continues to require present interventions due to patient's inability to perform exercises and functional mobility independently  · . Use of outcome tool(s) and clinical judgement create a POC that gives a: Clear prediction of patient's progress: LOW COMPLEXITY            TREATMENT:   (In addition to Assessment/Re-Assessment sessions the following treatments were rendered)     Pre-treatment Symptoms/Complaints:  \"I'm going to stay til tomorrow. \"  Pain Initial:   Pain Intensity 1: 3  Post Session:  3/10   Gait Training (10 Minutes):  Gait training to improve and/or restore physical functioning as related to mobility, balance and coordination. Ambulated 115 Feet (ft) with Contact guard assistance using a Walker, rolling and minimal Safety awareness training;Verbal cues related to their stance phase and stride length to promote proper body alignment.      Therapeutic Exercise: (15 Minutes):  Exercises per grid below to improve mobility and strength. Required minimal visual, verbal, and manual cues to promote proper body alignment, promote proper body posture, and promote proper body mechanics. Progressed range and repetitions as indicated. Date:  8/11 Date:  8/12/20 Date:     ACTIVITY/EXERCISE AM PM AM PM AM PM   GROUP THERAPY  []  []  []  []  []  []   Ankle Pumps  10a 15      Quad Sets  10a 15      Gluteal Sets  10a 15      Hip ABd/ADduction  10aa 15      Straight Leg Raises         Knee Slides  10aa 15      Short Arc Quads   15      Long Arc Quads   15      Chair Slides                  B = bilateral; AA = active assistive; A = active; P = passive      Treatment/Session Assessment:     Response to Treatment:  Participated well and increased gait. Need to work on keeping walker closer. Education:  [x] Home Exercises  [x] Fall Precautions  [x] Hip Precautions [] D/C Instruction Review  [x] Hip Prosthesis Review  [x] Walker Management/Safety [] Adaptive Equipment as Needed       Interdisciplinary Collaboration:   o Physical Therapist  o Registered Nurse    After treatment position/precautions:   o Up in chair  o Bed/Chair-wheels locked  o Bed in low position  o Call light within reach    Compliance with Program/Exercises: Will assess as treatment progresses. Recommendations/Intent for next treatment session:  Treatment next visit will focus on increasing Mr. Mariscal's independence with bed mobility, transfers, gait training, strength/ROM exercises, modalities for pain, and patient education.       Total Treatment Duration:       Em Orr, PT

## 2020-08-12 NOTE — PROGRESS NOTES
Problem: Mobility Impaired (Adult and Pediatric)  Goal: *Acute Goals and Plan of Care (Insert Text)  Outcome: Progressing Towards Goal  Note: GOALS (1-4 days):  (1.)Mr. Roni Roberts will move from supine to sit and sit to supine  in bed with STAND BY ASSIST.    (2.)Mr. Roni Roberts will transfer from bed to chair and chair to bed with STAND BY ASSIST using the least restrictive device. (3.)Mr. Roni Roberts will ambulate with STAND BY ASSIST for 150 feet with the least restrictive device. (4.)Mr. Roni Roberts will ambulate up/down 1 steps with left railing with MINIMAL ASSIST with device as needed. (5.)Mr. Roni Roberts will state/observe ESTELLE precautions with 0 verbal cues. ________________________________________________________________________________________________       PHYSICAL THERAPY JOINT CAMP ESTELLE: Daily Note and PM 8/12/2020  INPATIENT: Hospital Day: 2  [place] / Product Type: Medicare /      NAME/AGE/GENDER: Gifty Quezada is a [de-identified] y.o. male   PRIMARY DIAGNOSIS:  Unilateral primary osteoarthritis, left hip [M16.12]   Procedure(s) and Anesthesia Type:     * LEFT HIP ARTHROPLASTY TOTAL/ DEPUY - Spinal (Left)  ICD-10: Treatment Diagnosis:    · Pain in left hip (M25.552)  · Stiffness of Left Hip, Not elsewhere classified (M25.652)  · Difficulty in walking, Not elsewhere classified (R26.2)      ASSESSMENT:     Mr. Roni Roberts presents with decreased rom and strength of left LE as well as decreased functional mobility and gait s/p left estelle. He plans to go home with HHPT.  8/12/20:  Patient participated well. Increased gait distance with intermittent cues to stand closer to the walker. Plans to go home tomorrow. PM:  Increased gait distance again but still needing cues to stand closer to the walker. This section established at most recent assessment   PROBLEM LIST (Impairments causing functional limitations):  1. Decreased Strength  2.  Decreased ADL/Functional Activities  3. Decreased Transfer Abilities  4. Decreased Ambulation Ability/Technique  5. Decreased Balance  6. Increased Pain  7. Decreased Activity Tolerance  8. Decreased Flexibility/Joint Mobility  9. Decreased Ross with Home Exercise Program  10. Decreased strength   INTERVENTIONS PLANNED: (Benefits and precautions of physical therapy have been discussed with the patient.)  1. bed mobility  2. gait training  3. home exercise program (HEP)  4. Range of Motion: active/assisted/passive  5. Therapeutic Activities  6. therapeutic exercise/strengthening  7. transfer training  8. Group Therapy     TREATMENT PLAN: Frequency/Duration: Follow patient BID for duration of hospital stay to address above goals. Rehabilitation Potential For Stated Goals: Good     RECOMMENDED REHABILITATION/EQUIPMENT: (at time of discharge pending progress): Continue Skilled Therapy and Home Health: Physical Therapy. HISTORY:   History of Present Injury/Illness (Reason for Referral):  S/p left mehdi  Past Medical History/Comorbidities:   Mr. CHICASΤΟΚΟΠΟTRIPP  has a past medical history of Arrhythmia, Arthritis, CAD (coronary artery disease) (2010), Coagulation disorder ([facility] [address] Coronary atherosclerosis of native coronary artery (5/27/2010), Diabetes ([address] (type 2), Dyslipidemia (5/27/2010), Essential hypertension, benign ( ), History of coronary artery stent placement (2010), History of kidney stones, History of prior ablation treatment (2014), Kidney disease, Paroxysmal atrial fibrillation ([address] (10/2/2015), Platelet inhibition due to Plavix, Post PTCA, Sepsis ([address] Shingles outbreak, and Syncope and collapse (07/25/2014). He also has no past medical history of Coagulation defects or Unspecified adverse effect of anesthesia. Mr. OCASIOΙΤΤΟΚΟΠΟTRIPP  has a past surgical history that includes hx cyst removal (years ago); hx heart catheterization (2010); pr cardiac surg procedure unlist (2014); and hx cataract removal (Bilateral).   Social History/Living Environment:   Home Environment: Private residence  One/Two Story Residence: One story  Living Alone: No  Support Systems: Spouse/Significant Other/Partner  Patient Expects to be Discharged to[de-identified] Private residence  Current DME Used/Available at Home: None  Prior Level of Function/Work/Activity:  independent   Number of Personal Factors/Comorbidities that affect the Plan of Care: 1-2: MODERATE COMPLEXITY   EXAMINATION:   Most Recent Physical Functioning:      Gross Assessment  AROM: Generally decreased, functional  Strength: Generally decreased, functional                LLE Strength  L Hip Flexion: 2+  L Hip ABduction: 2+  L Knee Flexion: 2+  L Knee Extension: 3    Bed Mobility  Supine to Sit: Minimum assistance  Sit to Supine: Stand-by assistance    Transfers  Sit to Stand: Stand-by assistance  Stand to Sit: Stand-by assistance  Bed to Chair: Stand-by assistance    Balance  Sitting: Intact  Standing: With support              Weight Bearing Status  Left Side Weight Bearing: As tolerated  Distance (ft): 240 Feet (ft)  Ambulation - Level of Assistance: Stand-by assistance  Assistive Device: Walker, rolling  Base of Support: Center of gravity altered  Speed/Katelyn: Pace decreased (<100 feet/min)  Step Length: Left shortened;Right shortened  Stance: Left decreased  Gait Abnormalities: Antalgic  Interventions: Safety awareness training;Verbal cues     Braces/Orthotics:            Body Structures Involved:  1. Bones  2. Joints  3. Muscles  4. Ligaments Body Functions Affected:  1. Movement Related Activities and Participation Affected:  1. Mobility   Number of elements that affect the Plan of Care: 3: MODERATE COMPLEXITY   CLINICAL PRESENTATION:   Presentation: Stable and uncomplicated: LOW COMPLEXITY   CLINICAL DECISION MAKIN [facility] [address] [address] AM-PAC 6 Clicks   Basic Mobility Inpatient Short Form  How much difficulty does the patient currently have. .. Unable A Lot A Little None   1.   Turning over in bed (including adjusting bedclothes, sheets and blankets)? [] 1   [] 2   [x] 3   [] 4   2. Sitting down on and standing up from a chair with arms ( e.g., wheelchair, bedside commode, etc.)   [] 1   [] 2   [x] 3   [] 4   3. Moving from lying on back to sitting on the side of the bed? [] 1   [] 2   [x] 3   [] 4   How much help from another person does the patient currently need. .. Total A Lot A Little None   4. Moving to and from a bed to a chair (including a wheelchair)? [] 1   [] 2   [x] 3   [] 4   5. Need to walk in hospital room? [] 1   [x] 2   [] 3   [] 4   6. Climbing 3-5 steps with a railing? [] 1   [x] 2   [] 3   [] 4   © 2007, Trustees of [address] [place] [address], under license to [employer]. All rights reserved     Score:  Initial: 16 Most Recent: X (Date: -- )    Interpretation of Tool:  Represents activities that are increasingly more difficult (i.e. Bed mobility, Transfers, Gait). Medical Necessity:     · Patient is expected to demonstrate progress in   · strength, range of motion, and balance  ·  to   · decrease assistance required with exercises and functional mobility   · . Reason for Services/Other Comments:  · Patient   · continues to require present interventions due to patient's inability to perform exercises and functional mobility independently  · . Use of outcome tool(s) and clinical judgement create a POC that gives a: Clear prediction of patient's progress: LOW COMPLEXITY            TREATMENT:   (In addition to Assessment/Re-Assessment sessions the following treatments were rendered)     Pre-treatment Symptoms/Complaints:  Patient agreeable. Pain Initial:   Pain Intensity 1: 2  Post Session:  2/10   Gait Training (10 Minutes):  Gait training to improve and/or restore physical functioning as related to mobility, balance and coordination.   Ambulated 240 Feet (ft) with Stand-by assistance using a Walker, rolling and minimal Safety awareness training;Verbal cues related to their stance phase and stride length to promote proper body alignment. Therapeutic Exercise: (15 Minutes):  Exercises per grid below to improve mobility and strength. Required minimal visual, verbal, and manual cues to promote proper body alignment, promote proper body posture, and promote proper body mechanics. Progressed range and repetitions as indicated. Date:  8/11 Date:  8/12/20 Date:     ACTIVITY/EXERCISE AM PM AM PM AM PM   GROUP THERAPY  []  []  []  []  []  []   Ankle Pumps  10a 15 15     Quad Sets  10a 15 15     Gluteal Sets  10a 15 15     Hip ABd/ADduction  10aa 15 15     Straight Leg Raises         Knee Slides  10aa 15 15     Short Arc Quads   15 15     Long Arc Quads   15 15     Chair Slides                  B = bilateral; AA = active assistive; A = active; P = passive      Treatment/Session Assessment:     Response to Treatment:  Participated well and increased gait distance again. Education:  [x] Home Exercises  [x] Fall Precautions  [x] Hip Precautions [] D/C Instruction Review  [x] Hip Prosthesis Review  [x] Walker Management/Safety [] Adaptive Equipment as Needed       Interdisciplinary Collaboration:   o Physical Therapist  o Registered Nurse    After treatment position/precautions:   o Supine in bed  o Bed/Chair-wheels locked  o Bed in low position  o Call light within reach    Compliance with Program/Exercises: Will assess as treatment progresses. Recommendations/Intent for next treatment session:  Treatment next visit will focus on increasing Mr. Minayas independence with bed mobility, transfers, gait training, strength/ROM exercises, modalities for pain, and patient education.       Total Treatment Duration:  PT Patient Time In/Time Out  Time In: 1345  Time Out: [facility]

## 2020-08-12 NOTE — PROGRESS NOTES
Pt resting well in bed with call light in reach. Graf catheter remain patent and drainning césar urine. Pt denies any hip pain. Appetite good. Assessment unchanged.

## 2020-08-12 NOTE — PROGRESS NOTES
Pt voided 100ml in urinal at 2pm.  Pt bladder scanned and 600ml scanned. Frausto placed using sterile tech. Pt tolerated well. 650ml urine returned in frausto bag.

## 2020-08-12 NOTE — PROGRESS NOTES
Problem: Self Care Deficits Care Plan (Adult)  Goal: *Acute Goals and Plan of Care (Insert Text)  Description: GOALS:   DISCHARGE GOALS (in preparation for going home/rehab):  3 days  1. Mr. Gladys Jean will perform one lower body dressing activity with minimal assistance with adaptive equipment to demonstrate improved functional mobility and safety. 2.  Mr. Gladys Jean will perform one lower body bathing activity with minimal  assistance with adaptive equipment to demonstrate improved functional mobility and safety. -GOAL MET 8/12/2020  3. Mr. Gladys Jean will perform toileting/toilet transfer with contact guard assistance with adaptive equipment to demonstrate improved functional mobility and safety. 4.  Mr. Gladys Jean will perform shower transfer with contact guard assistance with adaptive equipment to demonstrate improved functional mobility and safety. 5.  Mr. Gladys Jean will state ESTELLE precautions with two verbal cues to demonstrate improved functional mobility and safety. JOINT CAMP OCCUPATIONAL THERAPY ESTELLE: Daily Note and Discharge 8/12/2020  INPATIENT: Hospital Day: 2  [place] / Product Type: Medicare /      NAME/AGE/GENDER: Ok Peabody is a [de-identified] y.o. male   PRIMARY DIAGNOSIS:  Unilateral primary osteoarthritis, left hip [M16.12]   Procedure(s) and Anesthesia Type:     * LEFT HIP ARTHROPLASTY TOTAL/ DEPUY - Spinal (Left)  ICD-10: Treatment Diagnosis:    · Pain in left hip (M25.552)  · Stiffness of Left Hip, Not elsewhere classified (E12.044)      ASSESSMENT:     Mr. Gladys Jean is s/p LEft ESTELLE and presents with decreased weight bearing on L LE and decreased independence with functional mobility and activities of daily living. Patient completed shower and dressing as charted below in ADL grid and is ambulating with rolling walker and contact guard assist.   Patient has met 1/5 goals and plans to return home with good family support.    Continue OT      This section established at most recent assessment   PROBLEM LIST (Impairments causing functional limitations):  1. Decreased Strength  2. Decreased ADL/Functional Activities  3. Decreased Transfer Abilities  4. Increased Pain  5. Increased Fatigue  6. Decreased Flexibility/Joint Mobility  7. Decreased Knowledge of Precautions   INTERVENTIONS PLANNED: (Benefits and precautions of occupational therapy have been discussed with the patient.)  1. Activities of daily living training  2. Adaptive equipment training  3. Balance training  4. Clothing management  5. Donning&doffing training  6. Theraputic activity     TREATMENT PLAN: Frequency/Duration: Follow patient 1-2tx to address above goals. Rehabilitation Potential For Stated Goals: Excellent     RECOMMENDED REHABILITATION/EQUIPMENT: (at time of discharge pending progress): Continue Skilled Therapy. OCCUPATIONAL PROFILE AND HISTORY:   History of Present Injury/Illness (Reason for Referral): Pt presents this date s/p (Left) ESTELLE. Past Medical History/Comorbidities:   Mr. Hayes Petersen  has a past medical history of Arrhythmia, Arthritis, CAD (coronary artery disease) (2010), Coagulation disorder ([address] Coronary atherosclerosis of native coronary artery (5/27/2010), Diabetes ([address] (type 2), Dyslipidemia (5/27/2010), Essential hypertension, benign ( ), History of coronary artery stent placement (2010), History of kidney stones, History of prior ablation treatment (2014), Kidney disease, Paroxysmal atrial fibrillation ([address] (10/2/2015), Platelet inhibition due to Plavix, Post PTCA, Sepsis ([address] Shingles outbreak, and Syncope and collapse (07/25/2014). He also has no past medical history of Coagulation defects or Unspecified adverse effect of anesthesia. Mr. Hayes Petersen  has a past surgical history that includes hx cyst removal (years ago); hx heart catheterization (2010); pr cardiac surg procedure unlist (2014); and hx cataract removal (Bilateral).   Social History/Living Environment:   Home Environment: Private residence  One/Two Story Residence: One story  Living Alone: No  Support Systems: Spouse/Significant Other/Partner  Patient Expects to be Discharged to[de-identified] Private residence  Current DME Used/Available at Home: None  Prior Level of Function/Work/Activity:  Independent prior. [employer]     Number of Personal Factors/Comorbidities that affect the Plan of Care: Brief history (0):  LOW COMPLEXITY   ASSESSMENT OF OCCUPATIONAL PERFORMANCE[de-identified]   Most Recent Physical Functioning:   Balance  Sitting: Intact  Standing: With support       Gross Assessment  AROM: Generally decreased, functional  Strength: Generally decreased, functional          LLE Strength  L Hip Flexion: 2+  L Hip ABduction: 2+  L Knee Flexion: 2+  L Knee Extension: 3 Coordination  Fine Motor Skills-Upper: Left Intact; Right Intact  Gross Motor Skills-Upper: Left Intact; Right Intact         Mental Status  Neurologic State: Alert  Orientation Level: Oriented X4  Cognition: Appropriate decision making  Perception: Appears intact                Basic ADLs (From Assessment) Complex ADLs (From Assessment)   Basic ADL  Feeding: Independent  Oral Facial Hygiene/Grooming: Setup  Bathing: Minimum assistance  Type of Bath: Chlorhexidine (CHG), Full, Shower  Upper Body Dressing: Setup  Lower Body Dressing: Moderate assistance  Toileting: Minimum assistance     Grooming/Bathing/Dressing Activities of Daily Living                       Functional Transfers  Bathroom Mobility: Minimum assistance  Toilet Transfer : Minimum assistance  Shower Transfer: Minimum assistance     Bed/Mat Mobility  Supine to Sit: Minimum assistance  Sit to Stand: Contact guard assistance  Stand to Sit: Contact guard assistance  Bed to Chair: Contact guard assistance         Physical Skills Involved:  1. Range of Motion  2. Balance  3. Strength Cognitive Skills Affected (resulting in the inability to perform in a timely and safe manner):  1.  [facility] Psychosocial Skills Affected:  1. WFL   Number of elements that affect the Plan of Care: 1-3:  LOW COMPLEXITY   CLINICAL DECISION MAKIN[address] [place] [address] AM-PAC 6 Clicks   Daily Activity Inpatient Short Form  How much help from another person does the patient currently need. .. Total A Lot A Little None   1. Putting on and taking off regular lower body clothing? [] 1   [x] 2   [] 3   [] 4   2. Bathing (including washing, rinsing, drying)? [] 1   [x] 2   [] 3   [] 4   3. Toileting, which includes using toilet, bedpan or urinal?   [] 1   [x] 2   [] 3   [] 4   4. Putting on and taking off regular upper body clothing? [] 1   [] 2   [] 3   [x] 4   5. Taking care of personal grooming such as brushing teeth? [] 1   [] 2   [] 3   [x] 4   6. Eating meals? [] 1   [] 2   [] 3   [x] 4   © , Trustees of [address] [place] [address], under license to [employer]. All rights reserved     Score:  Initial: 18 Most Recent: X (Date: -- )    Interpretation of Tool:  Represents activities that are increasingly more difficult (i.e. Bed mobility, Transfers, Gait). Medical Necessity:     · Skilled intervention continues to be required due to Deficits noted abvoe. Reason for Services/Other Comments:  · Patient continues to require skilled intervention due to   · New ESTELEL  · . Use of outcome tool(s) and clinical judgement create a POC that gives a: MODERATE COMPLEXITY            TREATMENT:   (In addition to Assessment/Re-Assessment sessions the following treatments were rendered)     Pre-treatment Symptoms/Complaints:    Pain: Initial:   Pain Intensity 1: 3  Pain Location 1: Hip  Pain Orientation 1: Left  Post Session:  0     Self Care: (42): Procedure(s) (per grid) utilized to improve and/or restore self-care/home management as related to dressing, toileting, and grooming. Required minimal verbal and tactile cueing to facilitate activities of daily living skills.         Treatment/Session Assessment:     Response to Treatment:  Good, sitting up in recliner. Education:  [] Home Exercises  [x] Fall Precautions  [x] Hip Precautions [] Going Home Video  [] Knee/Hip Prosthesis Review  [x] Walker Management/Safety [x] Adaptive Equipment as Needed       Interdisciplinary Collaboration:   o Physical Therapist  o Occupational Therapist  o Registered Nurse    After treatment position/precautions:   o Up in chair  o Bed/Chair-wheels locked  o Caregiver at bedside  o Call light within reach  o RN notified     Compliance with Program/Exercises: Compliant all of the time, Will assess as treatment progresses. Recommendations/Intent for next treatment session:  Treatment next visit will focus on increasing Mr. Minayas independence with bed mobility, transfers, self care, functional mobility, modalities for pain, and patient education.       Total Treatment Duration:  OT Patient Time In/Time Out  Time In: 0906  Time Out: NEVA Ha

## 2020-08-12 NOTE — PROGRESS NOTES
2020         Post Op day: 1 Day Post-Op   Admit Diagnosis: Unilateral primary osteoarthritis, left hip [M16.12]  Arthritis of left hip [M16.12]  LAB:    Recent Results (from the past 24 hour(s))   GLUCOSE, POC    Collection Time: 20  9:31 AM   Result Value Ref Range    Glucose (POC) 109 (H) 65 - 100 mg/dL   HEMOGLOBIN    Collection Time: 20  7:13 PM   Result Value Ref Range    HGB 14.3 13.6 - 17.2 g/dL   HEMOGLOBIN    Collection Time: 20  3:15 AM   Result Value Ref Range    HGB 14.8 13.6 - 17.2 g/dL     Vital Signs:    Patient Vitals for the past 8 hrs:   BP Temp Pulse Resp SpO2   20 0415 138/81 97.4 °F (36.3 °C) 83 16 97 %   20 2337 157/50 98.1 °F (36.7 °C) 92 16 97 %     Temp (24hrs), Av.9 °F (36.6 °C), Min:97.4 °F (36.3 °C), Max:98.2 °F (36.8 °C)    Pain Control:   Pain Assessment  Pain Scale 1: Numeric (0 - 10)  Pain Intensity 1: 0  Subjective: Doing well, normal recovery experienced. Objective:  No Acute Distress, Alert and Oriented, Neurovascular exam is normal       Assessment:   Patient Active Problem List   Diagnosis Code    Atherosclerosis of native coronary artery of native heart with stable angina pectoris ([facility]) I25.118    Essential hypertension, benign I10    Dyslipidemia E78.5    WPW (Regi-Parkinson-White syndrome) I45.6    Acute pyelonephritis N10    Sepsis ([address] A41.9    Ureteral stone N20.1    ANNA (acute kidney injury) ([address] N17.9    Acute on chronic kidney disease, stage 4 N18.4    Obstructed, uropathy N13.9    Hypotension I95.9    Bacteremia due to Gram-negative bacteria R78.81    Chronic atrial fibrillation ([facility]) I48.20    Bilateral carotid artery stenosis I65.23    Arthritis of left hip M16.12       Status Post Procedure(s) (LRB):  LEFT HIP ARTHROPLASTY TOTAL/ DEPUY (Left)        Plan: Continue Physical Therapy, discharge home anticipated tomorrow after he is more mobile.   May need a catheter anchored if he can not empty this am while standing.    Signed By: Yemi Rodriguez MD

## 2020-08-12 NOTE — PROGRESS NOTES
Problem: Diabetes Self-Management  Goal: *Disease process and treatment process  Description: Define diabetes and identify own type of diabetes; list 3 options for treating diabetes. Outcome: Progressing Towards Goal  Goal: *Incorporating nutritional management into lifestyle  Description: Describe effect of type, amount and timing of food on blood glucose; list 3 methods for planning meals. Outcome: Progressing Towards Goal  Goal: *Incorporating physical activity into lifestyle  Description: State effect of exercise on blood glucose levels. Outcome: Progressing Towards Goal  Goal: *Developing strategies to promote health/change behavior  Description: Define the ABC's of diabetes; identify appropriate screenings, schedule and personal plan for screenings. Outcome: Progressing Towards Goal  Goal: *Using medications safely  Description: State effect of diabetes medications on diabetes; name diabetes medication taking, action and side effects. Outcome: Progressing Towards Goal  Goal: *Monitoring blood glucose, interpreting and using results  Description: Identify recommended blood glucose targets  and personal targets. Outcome: Progressing Towards Goal  Goal: *Prevention, detection, treatment of acute complications  Description: List symptoms of hyper- and hypoglycemia; describe how to treat low blood sugar and actions for lowering  high blood glucose level. Outcome: Progressing Towards Goal  Goal: *Prevention, detection and treatment of chronic complications  Description: Define the natural course of diabetes and describe the relationship of blood glucose levels to long term complications of diabetes.   Outcome: Progressing Towards Goal  Goal: *Developing strategies to address psychosocial issues  Description: Describe feelings about living with diabetes; identify support needed and support network  Outcome: Progressing Towards Goal  Goal: *Insulin pump training  Outcome: Progressing Towards Goal  Goal: *Sick day guidelines  Outcome: Progressing Towards Goal  Goal: *Patient Specific Goal (EDIT GOAL, INSERT TEXT)  Outcome: Progressing Towards Goal     Problem: Patient Education: Go to Patient Education Activity  Goal: Patient/Family Education  Outcome: Progressing Towards Goal     Problem: Falls - Risk of  Goal: *Absence of Falls  Description: Document Darryn Zhou Fall Risk and appropriate interventions in the flowsheet.   Outcome: Progressing Towards Goal  Note: Fall Risk Interventions:  Mobility Interventions: OT consult for ADLs    Mentation Interventions: Adequate sleep, hydration, pain control    Medication Interventions: Patient to call before getting OOB, Teach patient to arise slowly    Elimination Interventions: Call light in reach, Patient to call for help with toileting needs              Problem: Patient Education: Go to Patient Education Activity  Goal: Patient/Family Education  Outcome: Progressing Towards Goal     Problem: Patient Education: Go to Patient Education Activity  Goal: Patient/Family Education  Outcome: Progressing Towards Goal     Problem: Hip Replacement: Day of Surgery/Unit  Goal: Off Pathway (Use only if patient is Off Pathway)  Outcome: Progressing Towards Goal  Goal: Activity/Safety  Outcome: Progressing Towards Goal  Goal: Consults, if ordered  Outcome: Progressing Towards Goal  Goal: Diagnostic Test/Procedures  Outcome: Progressing Towards Goal  Goal: Nutrition/Diet  Outcome: Progressing Towards Goal  Goal: Medications  Outcome: Progressing Towards Goal  Goal: Respiratory  Outcome: Progressing Towards Goal  Goal: Treatments/Interventions/Procedures  Outcome: Progressing Towards Goal  Goal: Psychosocial  Outcome: Progressing Towards Goal  Goal: *Initiate mobility  Outcome: Progressing Towards Goal  Goal: *Optimal pain control at patient's stated goal  Outcome: Progressing Towards Goal  Goal: *Hemodynamically stable  Outcome: Progressing Towards Goal     Problem: Hip Replacement: Post Op Day 1  Goal: Off Pathway (Use only if patient is Off Pathway)  Outcome: Progressing Towards Goal  Goal: Activity/Safety  Outcome: Progressing Towards Goal  Goal: Diagnostic Test/Procedures  Outcome: Progressing Towards Goal  Goal: Nutrition/Diet  Outcome: Progressing Towards Goal  Goal: Medications  Outcome: Progressing Towards Goal  Goal: Respiratory  Outcome: Progressing Towards Goal  Goal: Treatments/Interventions/Procedures  Outcome: Progressing Towards Goal  Goal: Psychosocial  Outcome: Progressing Towards Goal  Goal: Discharge Planning  Outcome: Progressing Towards Goal  Goal: *Demonstrates progressive activity  Outcome: Progressing Towards Goal  Goal: *Optimal pain control at patient's stated goal  Outcome: Progressing Towards Goal  Goal: *Hemodynamically stable  Outcome: Progressing Towards Goal  Goal: *Discharge plan identified  Outcome: Progressing Towards Goal     Problem: Hip Replacement: Post-Op Day 2  Goal: Off Pathway (Use only if patient is Off Pathway)  Outcome: Progressing Towards Goal  Goal: Activity/Safety  Outcome: Progressing Towards Goal  Goal: Diagnostic Test/Procedures  Outcome: Progressing Towards Goal  Goal: Nutrition/Diet  Outcome: Progressing Towards Goal  Goal: Medications  Outcome: Progressing Towards Goal  Goal: Respiratory  Outcome: Progressing Towards Goal  Goal: Treatments/Interventions/Procedures  Outcome: Progressing Towards Goal  Goal: Psychosocial  Outcome: Progressing Towards Goal  Goal: *Met physical therapy criteria for discharge to the next level of care  Outcome: Progressing Towards Goal  Goal: *Optimal pain control with oral analgesia  Outcome: Progressing Towards Goal  Goal: *Hemodynamically stable  Outcome: Progressing Towards Goal  Goal: *Tolerating diet  Outcome: Progressing Towards Goal  Goal: *Verbalizes understanding of any indicated hip precautions  Outcome: Progressing Towards Goal  Goal: *Patient verbalizes understanding of discharge instructions  Outcome: Progressing Towards Goal     Problem: Patient Education: Go to Patient Education Activity  Goal: Patient/Family Education  Outcome: Progressing Towards Goal     Problem: Patient Education: Go to Patient Education Activity  Goal: Patient/Family Education  Description: Pt/caregiver will demonstrate and verbalize good understanding of OT recommendations regarding ADL status, functional transfer status, home safety, DME, AE, energy conservation techniques, follow-up therapy, for increasing safety with functional tasks upon discharge.     Outcome: Progressing Towards Goal

## 2020-08-13 VITALS
SYSTOLIC BLOOD PRESSURE: 130 MMHG | WEIGHT: 197.4 LBS | HEIGHT: 65 IN | TEMPERATURE: 97.9 F | BODY MASS INDEX: 32.89 KG/M2 | OXYGEN SATURATION: 98 % | DIASTOLIC BLOOD PRESSURE: 69 MMHG | HEART RATE: 71 BPM | RESPIRATION RATE: 16 BRPM

## 2020-08-13 LAB — HGB BLD-MCNC: 13.2 G/DL (ref 13.6–17.2)

## 2020-08-13 PROCEDURE — 85018 HEMOGLOBIN: CPT

## 2020-08-13 PROCEDURE — 74011250637 HC RX REV CODE- 250/637: Performed by: PHYSICIAN ASSISTANT

## 2020-08-13 PROCEDURE — 97110 THERAPEUTIC EXERCISES: CPT

## 2020-08-13 PROCEDURE — 97116 GAIT TRAINING THERAPY: CPT

## 2020-08-13 PROCEDURE — 97535 SELF CARE MNGMENT TRAINING: CPT

## 2020-08-13 PROCEDURE — 36415 COLL VENOUS BLD VENIPUNCTURE: CPT

## 2020-08-13 RX ADMIN — Medication 5 ML: at 05:22

## 2020-08-13 RX ADMIN — ASPIRIN 81 MG: 81 TABLET, COATED ORAL at 08:29

## 2020-08-13 RX ADMIN — Medication 1 AMPULE: at 08:29

## 2020-08-13 RX ADMIN — ACETAMINOPHEN 1000 MG: 500 TABLET, FILM COATED ORAL at 00:24

## 2020-08-13 RX ADMIN — LOSARTAN POTASSIUM 100 MG: 50 TABLET, FILM COATED ORAL at 08:29

## 2020-08-13 RX ADMIN — METOPROLOL TARTRATE 12.5 MG: 25 TABLET, FILM COATED ORAL at 08:30

## 2020-08-13 RX ADMIN — ACETAMINOPHEN 1000 MG: 500 TABLET, FILM COATED ORAL at 05:18

## 2020-08-13 RX ADMIN — HYDROCHLOROTHIAZIDE 12.5 MG: 12.5 CAPSULE ORAL at 08:30

## 2020-08-13 RX ADMIN — DOCUSATE SODIUM 50MG AND SENNOSIDES 8.6MG 2 TABLET: 8.6; 5 TABLET, FILM COATED ORAL at 08:29

## 2020-08-13 NOTE — PROGRESS NOTES
Problem: Diabetes Self-Management  Goal: *Disease process and treatment process  Description: Define diabetes and identify own type of diabetes; list 3 options for treating diabetes. Outcome: Progressing Towards Goal  Goal: *Incorporating nutritional management into lifestyle  Description: Describe effect of type, amount and timing of food on blood glucose; list 3 methods for planning meals. Outcome: Progressing Towards Goal  Goal: *Incorporating physical activity into lifestyle  Description: State effect of exercise on blood glucose levels. Outcome: Progressing Towards Goal  Goal: *Developing strategies to promote health/change behavior  Description: Define the ABC's of diabetes; identify appropriate screenings, schedule and personal plan for screenings. Outcome: Progressing Towards Goal  Goal: *Using medications safely  Description: State effect of diabetes medications on diabetes; name diabetes medication taking, action and side effects. Outcome: Progressing Towards Goal  Goal: *Monitoring blood glucose, interpreting and using results  Description: Identify recommended blood glucose targets  and personal targets. Outcome: Progressing Towards Goal  Goal: *Prevention, detection, treatment of acute complications  Description: List symptoms of hyper- and hypoglycemia; describe how to treat low blood sugar and actions for lowering  high blood glucose level. Outcome: Progressing Towards Goal  Goal: *Prevention, detection and treatment of chronic complications  Description: Define the natural course of diabetes and describe the relationship of blood glucose levels to long term complications of diabetes.   Outcome: Progressing Towards Goal  Goal: *Developing strategies to address psychosocial issues  Description: Describe feelings about living with diabetes; identify support needed and support network  Outcome: Progressing Towards Goal  Goal: *Insulin pump training  Outcome: Progressing Towards Goal  Goal: *Sick day guidelines  Outcome: Progressing Towards Goal  Goal: *Patient Specific Goal (EDIT GOAL, INSERT TEXT)  Outcome: Progressing Towards Goal     Problem: Patient Education: Go to Patient Education Activity  Goal: Patient/Family Education  Outcome: Progressing Towards Goal     Problem: Falls - Risk of  Goal: *Absence of Falls  Description: Document Noelle Lundberg Fall Risk and appropriate interventions in the flowsheet.   Outcome: Progressing Towards Goal  Note: Fall Risk Interventions:  Mobility Interventions: OT consult for ADLs, Patient to call before getting OOB    Mentation Interventions: Adequate sleep, hydration, pain control    Medication Interventions: Patient to call before getting OOB, Teach patient to arise slowly    Elimination Interventions: Call light in reach, Patient to call for help with toileting needs              Problem: Patient Education: Go to Patient Education Activity  Goal: Patient/Family Education  Outcome: Progressing Towards Goal     Problem: Patient Education: Go to Patient Education Activity  Goal: Patient/Family Education  Outcome: Progressing Towards Goal     Problem: Hip Replacement: Day of Surgery/Unit  Goal: Off Pathway (Use only if patient is Off Pathway)  Outcome: Progressing Towards Goal  Goal: Activity/Safety  Outcome: Progressing Towards Goal  Goal: Consults, if ordered  Outcome: Progressing Towards Goal  Goal: Diagnostic Test/Procedures  Outcome: Progressing Towards Goal  Goal: Nutrition/Diet  Outcome: Progressing Towards Goal  Goal: Medications  Outcome: Progressing Towards Goal  Goal: Respiratory  Outcome: Progressing Towards Goal  Goal: Treatments/Interventions/Procedures  Outcome: Progressing Towards Goal  Goal: Psychosocial  Outcome: Progressing Towards Goal  Goal: *Initiate mobility  Outcome: Progressing Towards Goal  Goal: *Optimal pain control at patient's stated goal  Outcome: Progressing Towards Goal  Goal: *Hemodynamically stable  Outcome: Progressing Towards Goal Problem: Hip Replacement: Post Op Day 1  Goal: Off Pathway (Use only if patient is Off Pathway)  Outcome: Progressing Towards Goal  Goal: Activity/Safety  Outcome: Progressing Towards Goal  Goal: Diagnostic Test/Procedures  Outcome: Progressing Towards Goal  Goal: Nutrition/Diet  Outcome: Progressing Towards Goal  Goal: Medications  Outcome: Progressing Towards Goal  Goal: Respiratory  Outcome: Progressing Towards Goal  Goal: Treatments/Interventions/Procedures  Outcome: Progressing Towards Goal  Goal: Psychosocial  Outcome: Progressing Towards Goal  Goal: Discharge Planning  Outcome: Progressing Towards Goal  Goal: *Demonstrates progressive activity  Outcome: Progressing Towards Goal  Goal: *Optimal pain control at patient's stated goal  Outcome: Progressing Towards Goal  Goal: *Hemodynamically stable  Outcome: Progressing Towards Goal  Goal: *Discharge plan identified  Outcome: Progressing Towards Goal     Problem: Hip Replacement: Post-Op Day 2  Goal: Off Pathway (Use only if patient is Off Pathway)  Outcome: Progressing Towards Goal  Goal: Activity/Safety  Outcome: Progressing Towards Goal  Goal: Diagnostic Test/Procedures  Outcome: Progressing Towards Goal  Goal: Nutrition/Diet  Outcome: Progressing Towards Goal  Goal: Medications  Outcome: Progressing Towards Goal  Goal: Respiratory  Outcome: Progressing Towards Goal  Goal: Treatments/Interventions/Procedures  Outcome: Progressing Towards Goal  Goal: Psychosocial  Outcome: Progressing Towards Goal  Goal: *Met physical therapy criteria for discharge to the next level of care  Outcome: Progressing Towards Goal  Goal: *Hemodynamically stable  Outcome: Progressing Towards Goal  Goal: *Verbalizes understanding of any indicated hip precautions  Outcome: Progressing Towards Goal  Goal: *Patient verbalizes understanding of discharge instructions  Outcome: Progressing Towards Goal     Problem: Patient Education: Go to Patient Education Activity  Goal: Patient/Family Education  Outcome: Progressing Towards Goal     Problem: Patient Education: Go to Patient Education Activity  Goal: Patient/Family Education  Description: Pt/caregiver will demonstrate and verbalize good understanding of OT recommendations regarding ADL status, functional transfer status, home safety, DME, AE, energy conservation techniques, follow-up therapy, for increasing safety with functional tasks upon discharge.     Outcome: Progressing Towards Goal

## 2020-08-13 NOTE — DISCHARGE INSTRUCTIONS
[address] [facility]   Patient Discharge Instructions    Maribel Tian / [id number] : 1940    Admitted 2020 Discharged: 2020     IF YOU HAVE ANY PROBLEMS ONCE YOU ARE AT HOME CALL THE FOLLOWING NUMBERS:   Main office number: [phone]    Take Home Medications     · It is important that you take the medication exactly as they are prescribed. · Keep your medication in the bottles provided by the pharmacist and keep a list of the medication names, dosages, and times to be taken in your wallet. · Do not take other medications without consulting your doctor. What to do at [address] your prehospital diet. If you have excessive nausea or vomitting call your doctor's office     Home Physical Therapy is arranged. Use rolling walker when walking. Patients who have had a joint replacement should not drive until you are seen for your follow up appointment by Dr. Cj Mcclain. When to Call    - Call if you have a temperature greater then 101  - Unable to keep food down  - Loose control of your bladder or bowel function  - Are unable to bear any weight   - Need a pain medication refill       DISCHARGE SUMMARY from Nurse    The following personal items collected during your admission are returned to you:   Dental Appliance: Dental Appliances: Uppers, With patient  Vision: Visual Aid: Glasses, With patient  Hearing Aid:    Jewelry:    Clothing: Clothing: None  Other Valuables: Other Valuables: Cell Phone  Valuables sent to safe:      PATIENT INSTRUCTIONS:    After general anesthesia or intravenous sedation, for 24 hours or while taking prescription Narcotics:  · Limit your activities  · Do not drive and operate hazardous machinery  · Do not make important personal or business decisions  · Do  not drink alcoholic beverages  · If you have not urinated within 8 hours after discharge, please contact your surgeon on call.     Report the following to your surgeon:  · Excessive pain, swelling, redness or odor of or around the surgical area  · Temperature over 101  · Nausea and vomiting lasting longer than 4 hours or if unable to take medications  · Any signs of decreased circulation or nerve impairment to extremity: change in color, persistent  numbness, tingling, coldness or increase pain  · Any questions, call office @ [phone]      Keep scheduled follow up appointment. If need to change, call office @ [phone]. *  Please give a list of your current medications to your Primary Care Provider. *  Please update this list whenever your medications are discontinued, doses are      changed, or new medications (including over-the-counter products) are added. *  Please carry medication information at all times in case of emergency situations. Patient Education        Hip Replacement Surgery (Posterior): What to Expect at Home  Your Recovery  Hip replacement surgery replaces the worn parts of your hip joint. When you leave the hospital, you will probably be walking with crutches or a walker. You may be able to climb a few stairs and get in and out of bed and chairs. But you will need someone to help you at home for the next few weeks or until you have more energy and can move around better. If you need more extensive rehab, you may go to a specialized rehab center for more treatment. You will go home with a bandage and stitches, staples, tissue glue, or tape strips. You can remove the bandage when your doctor tells you to. If you have stitches or staples, your doctor will remove them 10 days to 3 weeks after your surgery. Glue or tape strips will fall off on their own over time. You may still have some mild pain, and the area may be swollen for 3 to 4 months after surgery. Your doctor will give you medicine for the pain.   You will continue the rehabilitation program (rehab) you started in the hospital. The better you do with your rehab exercises, the sooner you will get your strength and movement back. Most people are able to return to work 4 weeks to 4 months after surgery. This care sheet gives you a general idea about how long it will take for you to recover. But each person recovers at a different pace. Follow the steps below to get better as quickly as possible. How can you care for yourself at home? Activity  · Your doctor may not want your affected leg to cross the center of your body toward the other leg. If so, your therapist may suggest these ideas:  ? Do not cross your legs. ? Be very careful as you get in or out of bed or a car, so your leg does not cross that imaginary line in the middle of your body. · Rest when you feel tired. You may take a nap, but do not stay in bed all day. · Work with your physical therapist to learn the best way to exercise. You will probably have to use crutches or a walker for at least 4 to 6 weeks. · Your doctor may advise you to stay away from activities that put stress on the joint. This includes sports such as tennis, football, and jogging. · Try not to sit for too long at one time. You will feel less stiff if you take a short walk about every hour. When you sit, use chairs with arms, and do not sit in low chairs. · Do not bend over more than 90 degrees (like the angle in a letter \"L\"). · Sleep on your back with your legs slightly apart or on your side with a pillow between your knees for about 6 weeks or as your doctor tells you. Do not sleep on your stomach or affected leg. · Ask your doctor when you can drive again. · Most people are able to return to work 4 weeks to 4 months after surgery. · Ask your doctor when it is okay for you to have sex. Diet  · By the time you leave the hospital, you will probably be eating your normal diet. If your stomach is upset, try bland, low-fat foods like plain rice, broiled chicken, toast, and yogurt. Your doctor may recommend that you take iron and vitamin supplements.   · Drink plenty of fluids (unless your doctor tells you not to). · Eat healthy foods, and watch your portion sizes. Try to stay at your ideal weight. Too much weight puts more stress on your new hip joint. · You may notice that your bowel movements are not regular right after your surgery. This is common. Try to avoid constipation and straining with bowel movements. You may want to take a fiber supplement every day. If you have not had a bowel movement after a couple of days, ask your doctor about taking a mild laxative. Medicines  · Your doctor will tell you if and when you can restart your medicines. He or she will also give you instructions about taking any new medicines. · If you take aspirin or some other blood thinner, ask your doctor if and when to start taking it again. Make sure that you understand exactly what your doctor wants you to do. · Your doctor may give you a blood-thinning medicine to prevent blood clots. If you take a blood thinner, be sure you get instructions about how to take your medicine safely. Blood thinners can cause serious bleeding problems. This medicine could be in pill form or as a shot (injection). If a shot is necessary, your doctor will tell you how to do this. · Be safe with medicines. Take pain medicines exactly as directed. ? If the doctor gave you a prescription medicine for pain, take it as prescribed. ? If you are not taking a prescription pain medicine, ask your doctor if you can take an over-the-counter medicine. · If you think your pain medicine is making you sick to your stomach:  ? Take your medicine after meals (unless your doctor has told you not to). ? Ask your doctor for a different pain medicine. · If your doctor prescribed antibiotics, take them as directed. Do not stop taking them just because you feel better. You need to take the full course of antibiotics. Incision care  · If your doctor told you how to care for your cut (incision), follow your doctor's instructions.  You will have a dressing over the cut. A dressing helps the incision heal and protects it. Your doctor will tell you how to take care of this. · If you did not get instructions, follow this general advice:  ? If you have strips of tape on the cut the doctor made, leave the tape on for a week or until it falls off.  ? If you have stitches or staples, your doctor will tell you when to come back to have them removed. ? If you have skin adhesive on the cut, leave it on until it falls off. Skin adhesive is also called glue or liquid stitches. ? Change the bandage every day. ? Wash the area daily with warm water, and pat it dry. Don't use hydrogen peroxide or alcohol. They can slow healing. ? You may cover the area with a gauze bandage if it oozes fluid or rubs against clothing. ? You may shower 24 to 48 hours after surgery. Pat the incision dry. Don't swim or take a bath for the first 2 weeks, or until your doctor tells you it is okay. Exercise  · Your rehab program will include a number of exercises to do. Always do them as your therapist tells you. Ice and elevation  · For pain, put ice or a cold pack on the area for 10 to 20 minutes at a time. Put a thin cloth between the ice and your skin. · Your ankle may swell for about 3 months. Prop up your ankle when you ice it or anytime you sit or lie down. Try to keep it above the level of your heart. This will help reduce swelling. Other instructions  Continue to wear your support stockings as your doctor says. These help to prevent blood clots. The length of time that you will have to wear them depends on your activity level and the amount of swelling you have. Most people wear these stockings for 4 to 6 weeks after surgery. Preventing falls is also very important. To prevent falls:  · Arrange furniture so that you will not trip on it. · Get rid of throw rugs, and move electrical cords out of the way. · Walk only in areas with plenty of light.   · Put grab bars in showers and bathtubs. · Avoid icy or snowy sidewalks. · Wear shoes with sturdy, flat soles. Follow-up care is a key part of your treatment and safety. Be sure to make and go to all appointments, and call your doctor if you are having problems. It's also a good idea to know your test results and keep a list of the medicines you take. When should you call for help? [id number] anytime you think you may need emergency care. For example, call if:  · You passed out (lost consciousness). · You have severe trouble breathing. · You have sudden chest pain and shortness of breath, or you cough up blood. Call your doctor now or seek immediate medical care if:  · You have signs that your hip may be dislocated, including:  ? Severe pain and not being able to stand. ? A crooked leg that looks like your hip is out of position. ? Not being able to bend or straighten your leg. · Your leg or foot is cool or pale or changes color. · You cannot feel or move your leg. · You have signs of a blood clot, such as:  ? Pain in your calf, back of the knee, thigh, or groin. ? Redness and swelling in your leg or groin. · Your incision comes open and begins to bleed, or the bleeding increases. · You feel like your heart is racing or beating irregularly. · You have signs of infection, such as:  ? Increased pain, swelling, warmth, or redness. ? Red streaks leading from the incision. ? Pus draining from the incision. ? A fever. Watch closely for changes in your health, and be sure to contact your doctor if:  · You do not have a bowel movement after taking a laxative. · You do not get better as expected. Where can you learn more? Go to http://www.gray.com/  Enter Q746 in the search box to learn more about \"Hip Replacement Surgery (Posterior): What to Expect at Home. \"  Current as of: March 2, 2020               Content Version: 12.5  © 9958-4805 Healthwise, Inc[employer].    Care instructions adapted under license by [employer] (which disclaims liability or warranty for this information). If you have questions about a medical condition or this instruction, always ask your healthcare professional. [address] any warranty or liability for your use of this information. These are general instructions for a healthy lifestyle:    No smoking/ No tobacco products/ Avoid exposure to second hand smoke    Surgeon General's Warning:  Quitting smoking now greatly reduces serious risk to your health. Obesity, smoking, and sedentary lifestyle greatly increases your risk for illness    A healthy diet, regular physical exercise & weight monitoring are important for maintaining a healthy lifestyle    You may be retaining fluid if you have a history of heart failure or if you experience any of the following symptoms:  Weight gain of 3 pounds or more overnight or 5 pounds in a week, increased swelling in our hands or feet or shortness of breath while lying flat in bed. Please call your doctor as soon as you notice any of these symptoms; do not wait until your next office visit. Recognize signs and symptoms of STROKE:    F-face looks uneven    A-arms unable to move or move even    S-speech slurred or non-existent    T-time-call 911 as soon as signs and symptoms begin-DO NOT go       Back to bed or wait to see if you get better-TIME IS BRAIN. The discharge information has been reviewed with the patient. The patient verbalized understanding. Information obtained by :  I understand that if any problems occur once I am at home I am to contact my physician. I understand and acknowledge receipt of the instructions indicated above.                                                                                                                                            Physician's or R.N.'s Signature                                                                  Date/Time Patient or Representative Signature                                                          Date/Time

## 2020-08-13 NOTE — PROGRESS NOTES
Problem: Self Care Deficits Care Plan (Adult)  Goal: *Acute Goals and Plan of Care (Insert Text)  Description: GOALS:   DISCHARGE GOALS (in preparation for going home/rehab):  3 days  1. Mr. Jessie Yanes will perform one lower body dressing activity with minimal assistance with adaptive equipment to demonstrate improved functional mobility and safety. -GOAL MET 8/13/2020   2. Mr. Jessie Yanes will perform one lower body bathing activity with minimal  assistance with adaptive equipment to demonstrate improved functional mobility and safety. -GOAL MET 8/12/2020  3. Mr. Jessie Yanes will perform toileting/toilet transfer with contact guard assistance with adaptive equipment to demonstrate improved functional mobility and safety. -GOAL MET 8/13/2020   4. Mr. Jessie Yanes will perform shower transfer with contact guard assistance with adaptive equipment to demonstrate improved functional mobility and safety. -GOAL MET 8/13/2020   5. Mr. Jessie Yanes will state ESTELLE precautions with two verbal cues to demonstrate improved functional mobility and safety. -GOAL MET 8/13/2020         JOINT CAMP OCCUPATIONAL THERAPY ESTELLE: Daily Note and Discharge 8/13/2020  INPATIENT: Hospital Day: 3  [place] / [place] / Product Type: Medicare /      NAME/AGE/GENDER: Piyush Rueda is a [de-identified] y.o. male   PRIMARY DIAGNOSIS:  Unilateral primary osteoarthritis, left hip [M16.12]   Procedure(s) and Anesthesia Type:     * LEFT HIP ARTHROPLASTY TOTAL/ DEPUY - Spinal (Left)  ICD-10: Treatment Diagnosis:    · Pain in left hip (M25.552)  · Stiffness of Left Hip, Not elsewhere classified (X64.565)      ASSESSMENT:     Mr. Jessie Yanes is s/p Left ESTELLE and presents with decreased weight bearing on L LE and decreased independence with functional mobility and activities of daily living.   Patient completed shower and dressing as charted below in ADL grid and is ambulating with rolling walker and contact guard assist.  Patient has met 5/5 goals and plans to return home with good family support. Will do well at home for self cares and transfers during ADL's.  D/C OT for acute deficits. This section established at most recent assessment   PROBLEM LIST (Impairments causing functional limitations):  1. Decreased Strength  2. Decreased ADL/Functional Activities  3. Decreased Transfer Abilities  4. Increased Pain  5. Increased Fatigue  6. Decreased Flexibility/Joint Mobility  7. Decreased Knowledge of Precautions   INTERVENTIONS PLANNED: (Benefits and precautions of occupational therapy have been discussed with the patient.)  1. Activities of daily living training  2. Adaptive equipment training  3. Balance training  4. Clothing management  5. Donning&doffing training  6. Theraputic activity     TREATMENT PLAN: Frequency/Duration: Follow patient 1-2tx to address above goals. Rehabilitation Potential For Stated Goals: Excellent     RECOMMENDED REHABILITATION/EQUIPMENT: (at time of discharge pending progress): Continue Skilled Therapy. OCCUPATIONAL PROFILE AND HISTORY:   History of Present Injury/Illness (Reason for Referral): Pt presents this date s/p (Left) ESTELLE. Past Medical History/Comorbidities:   Mr. CHICASΤΟΚΟΠΟTRIPP  has a past medical history of Arrhythmia, Arthritis, CAD (coronary artery disease) (2010), Coagulation disorder ([address] Coronary atherosclerosis of native coronary artery (5/27/2010), Diabetes ([address] (type 2), Dyslipidemia (5/27/2010), Essential hypertension, benign ( ), History of coronary artery stent placement (2010), History of kidney stones, History of prior ablation treatment (2014), Kidney disease, Paroxysmal atrial fibrillation ([address] (10/2/2015), Platelet inhibition due to Plavix, Post PTCA, Sepsis ([address] Shingles outbreak, and Syncope and collapse (07/25/2014). He also has no past medical history of Coagulation defects or Unspecified adverse effect of anesthesia.   Mr. CHICASΤΟΚΟΠΟTRIPP  has a past surgical history that includes hx cyst removal (years ago); hx heart catheterization (2010); pr cardiac surg procedure unlist (2014); and hx cataract removal (Bilateral). Social History/Living Environment:   Home Environment: Private residence  One/Two Story Residence: One story  Living Alone: No  Support Systems: Spouse/Significant Other/Partner  Patient Expects to be Discharged to[de-identified] Private residence  Current DME Used/Available at Home: None  Prior Level of Function/Work/Activity:  Independent prior. [place]     Number of Personal Factors/Comorbidities that affect the Plan of Care: Brief history (0):  LOW COMPLEXITY   ASSESSMENT OF OCCUPATIONAL PERFORMANCE[de-identified]   Most Recent Physical Functioning:   Balance  Sitting: Intact  Standing: With support                    Coordination  Fine Motor Skills-Upper: Left Intact; Right Intact  Gross Motor Skills-Upper: Left Intact; Right Intact         Mental Status  Neurologic State: Alert  Orientation Level: Oriented X4  Cognition: Appropriate decision making  Perception: Appears intact                Basic ADLs (From Assessment) Complex ADLs (From Assessment)   Basic ADL  Feeding: Independent  Oral Facial Hygiene/Grooming: Supervision  Bathing: Stand-by assistance  Type of Bath: Chlorhexidine (CHG), Full, Shower  Upper Body Dressing: Setup  Lower Body Dressing: Minimum assistance  Toileting: Contact guard assistance     Grooming/Bathing/Dressing Activities of Daily Living                       Functional Transfers  Bathroom Mobility: Contact guard assistance  Toilet Transfer : Stand-by assistance  Shower Transfer: Contact guard assistance     Bed/Mat Mobility  Supine to Sit: Contact guard assistance  Sit to Stand: Stand-by assistance  Stand to Sit: Stand-by assistance  Bed to Chair: Stand-by assistance         Physical Skills Involved:  1. Range of Motion  2. Balance  3. Strength Cognitive Skills Affected (resulting in the inability to perform in a timely and safe manner):  1. [facility] Psychosocial Skills Affected:  1.  WFL Number of elements that affect the Plan of Care: 1-3:  LOW COMPLEXITY   CLINICAL DECISION MAKIN[address] [place] [address] AM-PAC 6 Clicks   Daily Activity Inpatient Short Form  How much help from another person does the patient currently need. .. Total A Lot A Little None   1. Putting on and taking off regular lower body clothing? [] 1   [] 2   [x] 3   [] 4   2. Bathing (including washing, rinsing, drying)? [] 1   [] 2   [x] 3   [] 4   3. Toileting, which includes using toilet, bedpan or urinal?   [] 1   [] 2   [x] 3   [] 4   4. Putting on and taking off regular upper body clothing? [] 1   [] 2   [] 3   [x] 4   5. Taking care of personal grooming such as brushing teeth? [] 1   [] 2   [] 3   [x] 4   6. Eating meals? [] 1   [] 2   [] 3   [x] 4   © , Trustees of [address] [place], [place] [address], under license to [employer]. All rights reserved     Score:  Initial: 18 Most Recent: 21 (Date: 2020 )    Interpretation of Tool:  Represents activities that are increasingly more difficult (i.e. Bed mobility, Transfers, Gait). Medical Necessity:     · Skilled intervention continues to be required due to Deficits noted abvoe. Reason for Services/Other Comments:  · Patient continues to require skilled intervention due to   · New ESTELLE  · . Use of outcome tool(s) and clinical judgement create a POC that gives a: MODERATE COMPLEXITY            TREATMENT:   (In addition to Assessment/Re-Assessment sessions the following treatments were rendered)     Pre-treatment Symptoms/Complaints:    Pain: Initial:   Pain Intensity 1: 2  Pain Location 1: Hip  Pain Orientation 1: Left  Post Session:  0     Self Care: (38): Procedure(s) (per grid) utilized to improve and/or restore self-care/home management as related to dressing, toileting, and grooming. Required minimal verbal and tactile cueing to facilitate activities of daily living skills.         Treatment/Session Assessment:     Response to Treatment:  Good, sitting up in recliner. Education:  [] Home Exercises  [x] Fall Precautions  [x] Hip Precautions [] Going Home Video  [] Knee/Hip Prosthesis Review  [x] Walker Management/Safety [x] Adaptive Equipment as Needed       Interdisciplinary Collaboration:   o Physical Therapist  o Occupational Therapist  o Registered Nurse    After treatment position/precautions:   o Up in chair  o Bed/Chair-wheels locked  o Caregiver at bedside  o Call light within reach  o RN notified     Compliance with Program/Exercises: Compliant all of the time, Will assess as treatment progresses. Recommendations/Intent for next treatment session:  D/C OT for acute deficits.       Total Treatment Duration:  OT Patient Time In/Time Out  Time In: 0850  Time Out: [address], OT

## 2020-08-13 NOTE — PROGRESS NOTES
Problem: Mobility Impaired (Adult and Pediatric)  Goal: *Acute Goals and Plan of Care (Insert Text)  Outcome: Progressing Towards Goal  Note: GOALS (1-4 days):  (1.)Mr. Karla Portillo will move from supine to sit and sit to supine  in bed with STAND BY ASSIST.    (2.)Mr. Karla Portillo will transfer from bed to chair and chair to bed with STAND BY ASSIST using the least restrictive device. (3.)Mr. Karla Portillo will ambulate with STAND BY ASSIST for 150 feet with the least restrictive device. (4.)Mr. Karla Portillo will ambulate up/down 1 steps with left railing with MINIMAL ASSIST with device as needed. (5.)Mr. Karla Portillo will state/observe ESTELLE precautions with 0 verbal cues. Goals met  ________________________________________________________________________________________________       PHYSICAL THERAPY JOINT CAMP ESTELLE: Daily Note and AM 8/13/2020  INPATIENT: Hospital Day: 3  [place] / [place] / Product Type: Medicare /      NAME/AGE/GENDER: Duke Lance is a [de-identified] y.o. male   PRIMARY DIAGNOSIS:  Unilateral primary osteoarthritis, left hip [M16.12]   Procedure(s) and Anesthesia Type:     * LEFT HIP ARTHROPLASTY TOTAL/ DEPUY - Spinal (Left)  ICD-10: Treatment Diagnosis:    · Pain in left hip (M25.552)  · Stiffness of Left Hip, Not elsewhere classified (M25.652)  · Difficulty in walking, Not elsewhere classified (R26.2)      ASSESSMENT:     Mr. Karla Portillo presents with decreased rom and strength of left LE as well as decreased functional mobility and gait s/p left estelle. He plans to go home with HHPT. Pt. Doing well this am and is eager to go home. His only concern was his catheter. He has no pain just reports some soreness in his hip. He ambulated in the marr with RW and did some steps. He did estelle exercises with cues only. Reviewed safety in detail and estelle precautions. He had no questions or concerns about going home today.     This section established at most recent assessment   PROBLEM LIST (Impairments causing functional limitations):  1. Decreased Strength  2. Decreased ADL/Functional Activities  3. Decreased Transfer Abilities  4. Decreased Ambulation Ability/Technique  5. Decreased Balance  6. Increased Pain  7. Decreased Activity Tolerance  8. Decreased Flexibility/Joint Mobility  9. Decreased [place] with Home Exercise Program  10. Decreased strength   INTERVENTIONS PLANNED: (Benefits and precautions of physical therapy have been discussed with the patient.)  1. bed mobility  2. gait training  3. home exercise program (HEP)  4. Range of Motion: active/assisted/passive  5. Therapeutic Activities  6. therapeutic exercise/strengthening  7. transfer training  8. Group Therapy     TREATMENT PLAN: Frequency/Duration: Follow patient BID for duration of hospital stay to address above goals. Rehabilitation Potential For Stated Goals: Good     RECOMMENDED REHABILITATION/EQUIPMENT: (at time of discharge pending progress): Continue Skilled Therapy and Home Health: Physical Therapy. HISTORY:   History of Present Injury/Illness (Reason for Referral):  S/p left mehdi  Past Medical History/Comorbidities:   Mr. STOVERΚΟΠYOGI  has a past medical history of Arrhythmia, Arthritis, CAD (coronary artery disease) (2010), Coagulation disorder ([address] Coronary atherosclerosis of native coronary artery (5/27/2010), Diabetes ([address] (type 2), Dyslipidemia (5/27/2010), Essential hypertension, benign ( ), History of coronary artery stent placement (2010), History of kidney stones, History of prior ablation treatment (2014), Kidney disease, Paroxysmal atrial fibrillation ([address] (10/2/2015), Platelet inhibition due to Plavix, Post PTCA, Sepsis ([address] Shingles outbreak, and Syncope and collapse (07/25/2014). He also has no past medical history of Coagulation defects or Unspecified adverse effect of anesthesia.   Mr. CHICASΤΟΚΟΠΟTRIPP  has a past surgical history that includes hx cyst removal (years ago); hx heart catheterization (2010); pr cardiac surg procedure unlist (2014); and hx cataract removal (Bilateral). Social History/Living Environment:   Home Environment: Private residence  One/Two Story Residence: One story  Living Alone: No  Support Systems: Spouse/Significant Other/Partner  Patient Expects to be Discharged to[de-identified] Private residence  Current DME Used/Available at Home: None  Prior Level of Function/Work/Activity:  independent   Number of Personal Factors/Comorbidities that affect the Plan of Care: 1-2: MODERATE COMPLEXITY   EXAMINATION:   Most Recent Physical Functioning:                 LLE AROM  L Hip Flexion: 80  L Hip ABduction: 10          Bed Mobility  Supine to Sit: Contact guard assistance    Transfers  Sit to Stand: Stand-by assistance  Stand to Sit: Stand-by assistance  Bed to Chair: Stand-by assistance    Balance  Sitting: Intact  Standing: With support              Weight Bearing Status  Left Side Weight Bearing: As tolerated  Distance (ft): 150 Feet (ft)  Ambulation - Level of Assistance: Stand-by assistance  Assistive Device: Walker, rolling  Speed/Katelyn: Delayed  Step Length: Left shortened;Right shortened  Stance: Left decreased  Gait Abnormalities: Antalgic  Number of Stairs Trained: 3  Stairs - Level of Assistance: Contact guard assistance;Minimum assistance  Rail Use: Both(also one step with walker)  Interventions: Safety awareness training;Verbal cues     Braces/Orthotics:     Left Hip Cold  Type: (declined)      Body Structures Involved:  1. Bones  2. Joints  3. Muscles  4. Ligaments Body Functions Affected:  1. Movement Related Activities and Participation Affected:  1. Mobility   Number of elements that affect the Plan of Care: 3: MODERATE COMPLEXITY   CLINICAL PRESENTATION:   Presentation: Stable and uncomplicated: LOW COMPLEXITY   CLINICAL DECISION MAKIN [facility] [address] [id number] AM-PAC 6 Clicks   Basic Mobility Inpatient Short Form  How much difficulty does the patient currently have. ..  Unable A Lot A Little None   1. Turning over in bed (including adjusting bedclothes, sheets and blankets)? [] 1   [] 2   [x] 3   [] 4   2. Sitting down on and standing up from a chair with arms ( e.g., wheelchair, bedside commode, etc.)   [] 1   [] 2   [x] 3   [] 4   3. Moving from lying on back to sitting on the side of the bed? [] 1   [] 2   [x] 3   [] 4   How much help from another person does the patient currently need. .. Total A Lot A Little None   4. Moving to and from a bed to a chair (including a wheelchair)? [] 1   [] 2   [x] 3   [] 4   5. Need to walk in hospital room? [] 1   [x] 2   [] 3   [] 4   6. Climbing 3-5 steps with a railing? [] 1   [x] 2   [] 3   [] 4   © 2007, Trustees of [address] [place] [address], under license to [employer]. All rights reserved     Score:  Initial: 16 Most Recent: X (Date: -- )    Interpretation of Tool:  Represents activities that are increasingly more difficult (i.e. Bed mobility, Transfers, Gait). Medical Necessity:     · Patient is expected to demonstrate progress in   · strength, range of motion, and balance  ·  to   · decrease assistance required with exercises and functional mobility   · . Reason for Services/Other Comments:  · Patient   · continues to require present interventions due to patient's inability to perform exercises and functional mobility independently  · . Use of outcome tool(s) and clinical judgement create a POC that gives a: Clear prediction of patient's progress: LOW COMPLEXITY            TREATMENT:   (In addition to Assessment/Re-Assessment sessions the following treatments were rendered)     Pre-treatment Symptoms/Complaints:  Patient agreeable. Pain Initial:      Post Session:  0   Gait Training (15 Minutes):  Gait training to improve and/or restore physical functioning as related to mobility, balance and coordination.   Ambulated 150 Feet (ft) with Stand-by assistance using a Walker, rolling and minimal Safety awareness training;Verbal cues related to their stance phase and stride length to promote proper body alignment. Therapeutic Exercise: (10 Minutes):  Exercises per grid below to improve mobility and strength. Required minimal visual, verbal, and manual cues to promote proper body alignment, promote proper body posture, and promote proper body mechanics. Progressed range and repetitions as indicated. Date:  8/11 Date:  8/12/20 Date:  8/13   ACTIVITY/EXERCISE AM PM AM PM AM PM   GROUP THERAPY  []  []  []  []  []  []   Ankle Pumps  10a 15 15 15a    Quad Sets  10a 15 15 15a    Gluteal Sets  10a 15 15 15a    Hip ABd/ADduction  10aa 15 15 15a    Straight Leg Raises         Knee Slides  10aa 15 15 15a    Short Arc Quads   15 15 15a    Long Arc Quads   15 15 15a    Chair Slides                  B = bilateral; AA = active assistive; A = active; P = passive      Treatment/Session Assessment:     Response to Treatment:  Patient did well    Education:  [x] Home Exercises  [x] Fall Precautions  [x] Hip Precautions [x] D/C Instruction Review  [x] Hip Prosthesis Review  [x] Walker Management/Safety [] Adaptive Equipment as Needed       Interdisciplinary Collaboration:   o Physical Therapist  o Registered Nurse    After treatment position/precautions:   o Up in chair  o Bed/Chair-wheels locked  o Bed in low position  o Call light within reach    Compliance with Program/Exercises: Compliant most of the time. Recommendations/Intent for next treatment session:  Treatment next visit will focus on increasing Mr. Minayas independence with bed mobility, transfers, gait training, strength/ROM exercises, modalities for pain, and patient education.       Total Treatment Duration:  PT Patient Time In/Time Out  Time In: 0940  Time Out: [address], PT

## 2020-08-13 NOTE — PROGRESS NOTES
Pt discharge summary and home medication sheet reviewed and signed by pt. Copy given for take home use. RX for po oxycodone given . [facility] urology called and made pt an appointment for 8/19/20 for catheter removal.  All information given to pt daughter. inst pt/wife/daughter on frausto catheter care and emptying . All voice understanding. All goals met. Assessment unchanged.

## 2020-08-13 NOTE — PROGRESS NOTES
2020         Post Op day: 2 Days Post-Op   Admit Diagnosis: Unilateral primary osteoarthritis, left hip [M16.12]  Arthritis of left hip [M16.12]  LAB:    Recent Results (from the past 24 hour(s))   HEMOGLOBIN    Collection Time: 20  6:48 AM   Result Value Ref Range    HGB 13.2 (L) 13.6 - 17.2 g/dL     Vital Signs:    Patient Vitals for the past 8 hrs:   BP Temp Pulse Resp SpO2   20 0702 125/65 97.9 °F (36.6 °C) 73 -- 98 %   20 0434 152/72 98.4 °F (36.9 °C) 61 16 97 %   20 0030 135/75 98.2 °F (36.8 °C) 74 16 96 %     Temp (24hrs), Av.1 °F (36.7 °C), Min:97.9 °F (36.6 °C), Max:98.4 °F (36.9 °C)    Pain Control:   Pain Assessment  Pain Scale 1: Numeric (0 - 10)  Pain Intensity 1: 0  Pain Location 1: Hip  Pain Orientation 1: Left  Subjective: Doing well, pain is well controlled, no complaints     Objective:  No Acute Distress, Alert and Oriented, left hip dressing is C/D/I. Neurovascular exam is normal       Assessment:   Patient Active Problem List   Diagnosis Code    Atherosclerosis of native coronary artery of native heart with stable angina pectoris ([facility]) I25.118    Essential hypertension, benign I10    Dyslipidemia E78.5    WPW (Regi-Parkinson-White syndrome) I45.6    Acute pyelonephritis N10    Sepsis ([address] A41.9    Ureteral stone N20.1    ANNA (acute kidney injury) ([facility] N17.9    Acute on chronic kidney disease, stage 4 N18.4    Obstructed, uropathy N13.9    Hypotension I95.9    Bacteremia due to Gram-negative bacteria R78.81    Chronic atrial fibrillation (HCC) I48.20    Bilateral carotid artery stenosis I65.23    Arthritis of left hip M16.12       Status Post Procedure(s) (LRB):  LEFT HIP ARTHROPLASTY TOTAL/ DEPUY (Left)        Plan: Continue Physical Therapy, Monitor Hgb. ASA/SCDs for DVT prophylaxis. Patient still has frausto due to POUR. Will go home with frausto with close follow up with urologist.  D/c to home today.    Signed By: CALLI Dumas

## 2020-08-14 ENCOUNTER — HOME CARE VISIT (OUTPATIENT)
Dept: SCHEDULING | Facility: HOME HEALTH | Age: 80
End: 2020-08-14
Payer: MEDICARE

## 2020-08-14 ENCOUNTER — PATIENT OUTREACH (OUTPATIENT)
Dept: CASE MANAGEMENT | Age: 80
End: 2020-08-14

## 2020-08-14 VITALS
RESPIRATION RATE: 16 BRPM | HEART RATE: 75 BPM | SYSTOLIC BLOOD PRESSURE: 130 MMHG | TEMPERATURE: 97.3 F | DIASTOLIC BLOOD PRESSURE: 70 MMHG

## 2020-08-14 PROCEDURE — G0151 HHCP-SERV OF PT,EA 15 MIN: HCPCS

## 2020-08-14 PROCEDURE — 3331090001 HH PPS REVENUE CREDIT

## 2020-08-14 PROCEDURE — 3331090002 HH PPS REVENUE DEBIT

## 2020-08-14 PROCEDURE — 400013 HH SOC

## 2020-08-14 PROCEDURE — G0299 HHS/HOSPICE OF RN EA 15 MIN: HCPCS

## 2020-08-14 NOTE — PROGRESS NOTES
Initial [facility] outreach attempt to patient's primary number was unsuccessful, call was declined. No further attempts for outreach will be made at this time.

## 2020-08-15 ENCOUNTER — HOME CARE VISIT (OUTPATIENT)
Dept: HOME HEALTH SERVICES | Facility: HOME HEALTH | Age: 80
End: 2020-08-15
Payer: MEDICARE

## 2020-08-15 VITALS
TEMPERATURE: 97.3 F | DIASTOLIC BLOOD PRESSURE: 70 MMHG | OXYGEN SATURATION: 96 % | HEART RATE: 76 BPM | SYSTOLIC BLOOD PRESSURE: 130 MMHG | RESPIRATION RATE: 18 BRPM

## 2020-08-15 PROCEDURE — 3331090002 HH PPS REVENUE DEBIT

## 2020-08-15 PROCEDURE — G0299 HHS/HOSPICE OF RN EA 15 MIN: HCPCS

## 2020-08-15 PROCEDURE — 3331090001 HH PPS REVENUE CREDIT

## 2020-08-16 VITALS
SYSTOLIC BLOOD PRESSURE: 138 MMHG | HEART RATE: 80 BPM | OXYGEN SATURATION: 97 % | DIASTOLIC BLOOD PRESSURE: 72 MMHG | TEMPERATURE: 98.1 F | RESPIRATION RATE: 16 BRPM

## 2020-08-16 PROCEDURE — 3331090001 HH PPS REVENUE CREDIT

## 2020-08-16 PROCEDURE — 3331090002 HH PPS REVENUE DEBIT

## 2020-08-17 ENCOUNTER — HOME CARE VISIT (OUTPATIENT)
Dept: SCHEDULING | Facility: HOME HEALTH | Age: 80
End: 2020-08-17
Payer: MEDICARE

## 2020-08-17 VITALS
OXYGEN SATURATION: 98 % | HEART RATE: 68 BPM | SYSTOLIC BLOOD PRESSURE: 120 MMHG | TEMPERATURE: 98.1 F | RESPIRATION RATE: 18 BRPM | DIASTOLIC BLOOD PRESSURE: 60 MMHG

## 2020-08-17 PROCEDURE — 3331090001 HH PPS REVENUE CREDIT

## 2020-08-17 PROCEDURE — G0299 HHS/HOSPICE OF RN EA 15 MIN: HCPCS

## 2020-08-17 PROCEDURE — 3331090002 HH PPS REVENUE DEBIT

## 2020-08-18 ENCOUNTER — HOME CARE VISIT (OUTPATIENT)
Dept: SCHEDULING | Facility: HOME HEALTH | Age: 80
End: 2020-08-18
Payer: MEDICARE

## 2020-08-18 VITALS
RESPIRATION RATE: 18 BRPM | DIASTOLIC BLOOD PRESSURE: 68 MMHG | HEART RATE: 69 BPM | TEMPERATURE: 97.8 F | SYSTOLIC BLOOD PRESSURE: 132 MMHG

## 2020-08-18 PROCEDURE — 3331090002 HH PPS REVENUE DEBIT

## 2020-08-18 PROCEDURE — 3331090001 HH PPS REVENUE CREDIT

## 2020-08-18 PROCEDURE — G0157 HHC PT ASSISTANT EA 15: HCPCS

## 2020-08-19 ENCOUNTER — HOME CARE VISIT (OUTPATIENT)
Dept: SCHEDULING | Facility: HOME HEALTH | Age: 80
End: 2020-08-19
Payer: MEDICARE

## 2020-08-19 VITALS
RESPIRATION RATE: 18 BRPM | SYSTOLIC BLOOD PRESSURE: 134 MMHG | DIASTOLIC BLOOD PRESSURE: 62 MMHG | HEART RATE: 79 BPM | TEMPERATURE: 97 F

## 2020-08-19 PROBLEM — R33.9 URINARY RETENTION: Status: ACTIVE | Noted: 2020-08-19

## 2020-08-19 PROCEDURE — 3331090001 HH PPS REVENUE CREDIT

## 2020-08-19 PROCEDURE — G0157 HHC PT ASSISTANT EA 15: HCPCS

## 2020-08-19 PROCEDURE — 3331090002 HH PPS REVENUE DEBIT

## 2020-08-20 PROCEDURE — 3331090001 HH PPS REVENUE CREDIT

## 2020-08-20 PROCEDURE — 3331090002 HH PPS REVENUE DEBIT

## 2020-08-21 ENCOUNTER — HOME CARE VISIT (OUTPATIENT)
Dept: SCHEDULING | Facility: HOME HEALTH | Age: 80
End: 2020-08-21
Payer: MEDICARE

## 2020-08-21 VITALS
HEART RATE: 72 BPM | SYSTOLIC BLOOD PRESSURE: 110 MMHG | DIASTOLIC BLOOD PRESSURE: 70 MMHG | TEMPERATURE: 97.5 F | RESPIRATION RATE: 8 BRPM | OXYGEN SATURATION: 97 %

## 2020-08-21 PROCEDURE — G0299 HHS/HOSPICE OF RN EA 15 MIN: HCPCS

## 2020-08-21 PROCEDURE — 3331090002 HH PPS REVENUE DEBIT

## 2020-08-21 PROCEDURE — 3331090001 HH PPS REVENUE CREDIT

## 2020-08-21 PROCEDURE — G0157 HHC PT ASSISTANT EA 15: HCPCS

## 2020-08-22 PROCEDURE — 3331090002 HH PPS REVENUE DEBIT

## 2020-08-22 PROCEDURE — 3331090001 HH PPS REVENUE CREDIT

## 2020-08-23 PROCEDURE — 3331090001 HH PPS REVENUE CREDIT

## 2020-08-23 PROCEDURE — 3331090002 HH PPS REVENUE DEBIT

## 2020-08-24 ENCOUNTER — HOME CARE VISIT (OUTPATIENT)
Dept: SCHEDULING | Facility: HOME HEALTH | Age: 80
End: 2020-08-24
Payer: MEDICARE

## 2020-08-24 VITALS
RESPIRATION RATE: 18 BRPM | TEMPERATURE: 98.2 F | DIASTOLIC BLOOD PRESSURE: 60 MMHG | SYSTOLIC BLOOD PRESSURE: 132 MMHG | HEART RATE: 61 BPM

## 2020-08-24 PROCEDURE — 3331090001 HH PPS REVENUE CREDIT

## 2020-08-24 PROCEDURE — G0157 HHC PT ASSISTANT EA 15: HCPCS

## 2020-08-24 PROCEDURE — 3331090002 HH PPS REVENUE DEBIT

## 2020-08-25 VITALS
RESPIRATION RATE: 18 BRPM | SYSTOLIC BLOOD PRESSURE: 126 MMHG | HEART RATE: 65 BPM | DIASTOLIC BLOOD PRESSURE: 58 MMHG | TEMPERATURE: 97.4 F

## 2020-08-25 PROCEDURE — 3331090001 HH PPS REVENUE CREDIT

## 2020-08-25 PROCEDURE — 3331090002 HH PPS REVENUE DEBIT

## 2020-08-26 ENCOUNTER — HOME CARE VISIT (OUTPATIENT)
Dept: SCHEDULING | Facility: HOME HEALTH | Age: 80
End: 2020-08-26
Payer: MEDICARE

## 2020-08-26 VITALS
DIASTOLIC BLOOD PRESSURE: 70 MMHG | HEART RATE: 72 BPM | SYSTOLIC BLOOD PRESSURE: 120 MMHG | OXYGEN SATURATION: 98 % | TEMPERATURE: 98.1 F | RESPIRATION RATE: 18 BRPM

## 2020-08-26 VITALS
SYSTOLIC BLOOD PRESSURE: 126 MMHG | DIASTOLIC BLOOD PRESSURE: 61 MMHG | RESPIRATION RATE: 18 BRPM | TEMPERATURE: 98.1 F | HEART RATE: 59 BPM

## 2020-08-26 PROCEDURE — G0299 HHS/HOSPICE OF RN EA 15 MIN: HCPCS

## 2020-08-26 PROCEDURE — G0157 HHC PT ASSISTANT EA 15: HCPCS

## 2020-08-26 PROCEDURE — 3331090001 HH PPS REVENUE CREDIT

## 2020-08-26 PROCEDURE — 3331090002 HH PPS REVENUE DEBIT

## 2020-08-27 PROCEDURE — 3331090001 HH PPS REVENUE CREDIT

## 2020-08-27 PROCEDURE — 3331090002 HH PPS REVENUE DEBIT

## 2020-08-28 ENCOUNTER — HOME CARE VISIT (OUTPATIENT)
Dept: SCHEDULING | Facility: HOME HEALTH | Age: 80
End: 2020-08-28
Payer: MEDICARE

## 2020-08-28 VITALS
OXYGEN SATURATION: 98 % | TEMPERATURE: 98.1 F | DIASTOLIC BLOOD PRESSURE: 70 MMHG | SYSTOLIC BLOOD PRESSURE: 130 MMHG | HEART RATE: 61 BPM | RESPIRATION RATE: 18 BRPM

## 2020-08-28 PROCEDURE — G0299 HHS/HOSPICE OF RN EA 15 MIN: HCPCS

## 2020-08-28 PROCEDURE — G0157 HHC PT ASSISTANT EA 15: HCPCS

## 2020-08-28 PROCEDURE — 3331090001 HH PPS REVENUE CREDIT

## 2020-08-28 PROCEDURE — 3331090002 HH PPS REVENUE DEBIT

## 2020-08-29 PROCEDURE — 3331090002 HH PPS REVENUE DEBIT

## 2020-08-29 PROCEDURE — 3331090001 HH PPS REVENUE CREDIT

## 2020-08-30 VITALS — RESPIRATION RATE: 18 BRPM

## 2020-08-30 PROCEDURE — 3331090001 HH PPS REVENUE CREDIT

## 2020-08-30 PROCEDURE — 3331090002 HH PPS REVENUE DEBIT

## 2020-08-31 ENCOUNTER — HOME CARE VISIT (OUTPATIENT)
Dept: SCHEDULING | Facility: HOME HEALTH | Age: 80
End: 2020-08-31
Payer: MEDICARE

## 2020-08-31 VITALS
TEMPERATURE: 97.6 F | DIASTOLIC BLOOD PRESSURE: 66 MMHG | HEART RATE: 76 BPM | SYSTOLIC BLOOD PRESSURE: 128 MMHG | RESPIRATION RATE: 18 BRPM

## 2020-08-31 PROCEDURE — G0299 HHS/HOSPICE OF RN EA 15 MIN: HCPCS

## 2020-08-31 PROCEDURE — 3331090002 HH PPS REVENUE DEBIT

## 2020-08-31 PROCEDURE — 3331090001 HH PPS REVENUE CREDIT

## 2020-08-31 PROCEDURE — G0157 HHC PT ASSISTANT EA 15: HCPCS

## 2020-09-01 PROCEDURE — 3331090001 HH PPS REVENUE CREDIT

## 2020-09-01 PROCEDURE — 3331090002 HH PPS REVENUE DEBIT

## 2020-09-02 ENCOUNTER — HOME CARE VISIT (OUTPATIENT)
Dept: SCHEDULING | Facility: HOME HEALTH | Age: 80
End: 2020-09-02
Payer: MEDICARE

## 2020-09-02 VITALS
RESPIRATION RATE: 18 BRPM | SYSTOLIC BLOOD PRESSURE: 130 MMHG | HEART RATE: 82 BPM | DIASTOLIC BLOOD PRESSURE: 70 MMHG | OXYGEN SATURATION: 98 % | TEMPERATURE: 98.3 F

## 2020-09-02 VITALS
SYSTOLIC BLOOD PRESSURE: 132 MMHG | DIASTOLIC BLOOD PRESSURE: 68 MMHG | RESPIRATION RATE: 16 BRPM | HEART RATE: 72 BPM | TEMPERATURE: 97.6 F

## 2020-09-02 PROCEDURE — 3331090001 HH PPS REVENUE CREDIT

## 2020-09-02 PROCEDURE — 3331090002 HH PPS REVENUE DEBIT

## 2020-09-02 PROCEDURE — G0151 HHCP-SERV OF PT,EA 15 MIN: HCPCS

## 2020-09-03 ENCOUNTER — HOME CARE VISIT (OUTPATIENT)
Dept: SCHEDULING | Facility: HOME HEALTH | Age: 80
End: 2020-09-03
Payer: MEDICARE

## 2020-09-03 VITALS
TEMPERATURE: 98.2 F | OXYGEN SATURATION: 98 % | DIASTOLIC BLOOD PRESSURE: 70 MMHG | SYSTOLIC BLOOD PRESSURE: 130 MMHG | RESPIRATION RATE: 18 BRPM | HEART RATE: 78 BPM

## 2020-09-03 PROCEDURE — 3331090002 HH PPS REVENUE DEBIT

## 2020-09-03 PROCEDURE — 3331090001 HH PPS REVENUE CREDIT

## 2020-09-03 PROCEDURE — G0299 HHS/HOSPICE OF RN EA 15 MIN: HCPCS

## 2020-09-04 PROCEDURE — 3331090002 HH PPS REVENUE DEBIT

## 2020-09-04 PROCEDURE — 3331090001 HH PPS REVENUE CREDIT

## 2020-09-05 PROCEDURE — 3331090002 HH PPS REVENUE DEBIT

## 2020-09-05 PROCEDURE — 3331090001 HH PPS REVENUE CREDIT

## 2020-09-06 PROCEDURE — 3331090002 HH PPS REVENUE DEBIT

## 2020-09-06 PROCEDURE — 3331090001 HH PPS REVENUE CREDIT

## 2020-09-07 PROCEDURE — 3331090002 HH PPS REVENUE DEBIT

## 2020-09-07 PROCEDURE — 3331090001 HH PPS REVENUE CREDIT

## 2021-12-29 ENCOUNTER — HOSPITAL ENCOUNTER (OUTPATIENT)
Dept: SURGERY | Age: 81
Discharge: HOME | End: 2021-12-29

## 2021-12-29 ENCOUNTER — HOSPITAL ENCOUNTER (OUTPATIENT)
Dept: SURGERY | Age: 81
Discharge: HOME | End: 2021-12-29
Attending: ORTHOPAEDIC SURGERY
Payer: MEDICARE

## 2021-12-29 VITALS
HEART RATE: 60 BPM | TEMPERATURE: 97.9 F | OXYGEN SATURATION: 96 % | HEIGHT: 65 IN | SYSTOLIC BLOOD PRESSURE: 200 MMHG | DIASTOLIC BLOOD PRESSURE: 100 MMHG | BODY MASS INDEX: 35.42 KG/M2 | RESPIRATION RATE: 18 BRPM | WEIGHT: 212.6 LBS

## 2021-12-29 LAB
ALBUMIN SERPL-MCNC: 3.3 G/DL (ref 3.2–4.6)
ALBUMIN/GLOB SERPL: 0.9 {RATIO} (ref 1.2–3.5)
ALP SERPL-CCNC: 124 U/L (ref 50–136)
ALT SERPL-CCNC: 38 U/L (ref 12–65)
ANION GAP SERPL CALC-SCNC: ABNORMAL MMOL/L (ref 7–16)
APPEARANCE UR: CLEAR
APTT PPP: 27.2 SEC (ref 24.1–35.1)
AST SERPL-CCNC: 31 U/L (ref 15–37)
BACTERIA SPEC CULT: NORMAL
BACTERIA URNS QL MICRO: 0 /HPF
BILIRUB SERPL-MCNC: 2.3 MG/DL (ref 0.2–1.1)
BILIRUB UR QL: NEGATIVE
BUN SERPL-MCNC: 20 MG/DL (ref 8–23)
CALCIUM SERPL-MCNC: 9.1 MG/DL (ref 8.3–10.4)
CHLORIDE SERPL-SCNC: 110 MMOL/L (ref 98–107)
CO2 SERPL-SCNC: 30 MMOL/L (ref 21–32)
COLOR UR: YELLOW
CREAT SERPL-MCNC: 1.34 MG/DL (ref 0.8–1.5)
ERYTHROCYTE [DISTWIDTH] IN BLOOD BY AUTOMATED COUNT: 13 % (ref 11.9–14.6)
GLOBULIN SER CALC-MCNC: 3.7 G/DL (ref 2.3–3.5)
GLUCOSE SERPL-MCNC: 126 MG/DL (ref 65–100)
GLUCOSE UR STRIP.AUTO-MCNC: NEGATIVE MG/DL
HCT VFR BLD AUTO: 48.4 % (ref 41.1–50.3)
HGB BLD-MCNC: 15.7 G/DL (ref 13.6–17.2)
HGB UR QL STRIP: NEGATIVE
INR PPP: 1.1
KETONES UR QL STRIP.AUTO: NEGATIVE MG/DL
LEUKOCYTE ESTERASE UR QL STRIP.AUTO: NEGATIVE
MAGNESIUM SERPL-MCNC: 2.2 MG/DL (ref 1.8–2.4)
MCH RBC QN AUTO: 31.1 PG (ref 26.1–32.9)
MCHC RBC AUTO-ENTMCNC: 32.4 G/DL (ref 31.4–35)
MCV RBC AUTO: 95.8 FL (ref 79.6–97.8)
NITRITE UR QL STRIP.AUTO: NEGATIVE
NRBC # BLD: 0 K/UL (ref 0–0.2)
PH UR STRIP: 6 [PH] (ref 5–9)
PLATELET # BLD AUTO: 167 K/UL (ref 150–450)
PMV BLD AUTO: 11 FL (ref 9.4–12.3)
POTASSIUM SERPL-SCNC: 4.5 MMOL/L (ref 3.5–5.1)
PROT SERPL-MCNC: 7 G/DL (ref 6.3–8.2)
PROT UR STRIP-MCNC: 30 MG/DL
PROTHROMBIN TIME: 14.9 SEC (ref 12.6–14.5)
RBC # BLD AUTO: 5.05 M/UL (ref 4.23–5.6)
SERVICE CMNT-IMP: NORMAL
SODIUM SERPL-SCNC: 137 MMOL/L (ref 136–145)
SP GR UR REFRACTOMETRY: 1.02 (ref 1–1.02)
UROBILINOGEN UR QL STRIP.AUTO: 1 EU/DL (ref 0.2–1)
WBC # BLD AUTO: 6.1 K/UL (ref 4.3–11.1)

## 2021-12-29 PROCEDURE — 36415 COLL VENOUS BLD VENIPUNCTURE: CPT

## 2021-12-29 PROCEDURE — 87641 MR-STAPH DNA AMP PROBE: CPT

## 2021-12-29 PROCEDURE — 85027 COMPLETE CBC AUTOMATED: CPT

## 2021-12-29 PROCEDURE — 81003 URINALYSIS AUTO W/O SCOPE: CPT

## 2021-12-29 PROCEDURE — 80053 COMPREHEN METABOLIC PANEL: CPT

## 2021-12-29 PROCEDURE — 85610 PROTHROMBIN TIME: CPT

## 2021-12-29 PROCEDURE — 94760 N-INVAS EAR/PLS OXIMETRY 1: CPT

## 2021-12-29 PROCEDURE — 85730 THROMBOPLASTIN TIME PARTIAL: CPT

## 2021-12-29 PROCEDURE — 83735 ASSAY OF MAGNESIUM: CPT

## 2021-12-29 RX ORDER — SODIUM CHLORIDE 0.9 % (FLUSH) 0.9 %
5-40 SYRINGE (ML) INJECTION EVERY 8 HOURS
Status: CANCELLED | OUTPATIENT
Start: 2021-12-29

## 2021-12-29 RX ORDER — SODIUM CHLORIDE 0.9 % (FLUSH) 0.9 %
5-40 SYRINGE (ML) INJECTION AS NEEDED
Status: CANCELLED | OUTPATIENT
Start: 2021-12-29

## 2021-12-29 NOTE — PROGRESS NOTES
12/29/21 1503   Oxygen Therapy   O2 Sat (%) 96 %   Pulse via Oximetry 56 beats per minute   O2 Device None (Room air)   Pre-Treatment   Breath Sounds Bilateral Clear;Diminished     Initial respiratory Assessment completed with pt. Pt was interviewed and evaluated in Joint camp prior to surgery. Patient ID:  Charlotte Babinski  [id number]  76 y.o.  1940  Surgeon: Dr. Rudolph Lugo  Date of Surgery: 1/6/2022  Procedure:  Total Right Shoulder Arthroplasty  Primary Care Physician: Long Weinstein [phone]  Specialists:     Pt taught proper COUGH technique  DIAPHRAGMATIC BREATHING EXERCISE INSTRUCTIONS GIVEN    History of smoking:   DENIES                 Quit date:         Secondhand smoke:FATHER    Past procedures with Oxygen desaturation or delayed awakening:DENIES    Past Medical History:   Diagnosis Date    Arrhythmia     AFlutter/SVT    Arthritis     CAD (coronary artery disease) 2010    2 xstents, Followed by Dr. Sejal Murillo Chronic kidney disease     Stage III    Coagulation disorder ([address]     eliquis/plavix    Coronary atherosclerosis of native coronary artery 5/27/2010    2010:  PCI LAD Xience x 2    Diabetes ([address] type 2    Controlled with diet     Dyslipidemia 5/27/2010    Essential hypertension, benign      History of coronary artery stent placement 2010    LAC, LAD stented    History of kidney stones     History of prior ablation treatment 2014    due to atrial fib    Kidney disease     Was seen by [place] [facility] after sepsis for acute Kidney disease, has since been released    Kidney stone     Paroxysmal atrial fibrillation ([address] 10/2/2015    Platelet inhibition due to Plavix     Post PTCA     WITH STENT    Sepsis ([address]     Shingles outbreak     Syncope and collapse 07/25/2014        Respiratory history:                                 SOB  ON EXERTION                                    Respiratory meds:  DENIES    FAMILY PRESENT:           DAUGHTER   PAST SLEEP STUDY:                          DENIES  HX OF NATANAEL:                                       DENIES  NATANAEL assessment:   A-FIB                                            SLEEPS ON SIDE       &      BACK          PHYSICAL EXAM   Body mass index is 35.38 kg/m². Visit Vitals  BP (!) 200/100 (BP 1 Location: Left upper arm, BP Patient Position: Sitting)   Pulse 60   Temp 97.9 °F (36.6 °C)   Resp 18   Ht 5' 5\" (1.651 m)   Wt 96.4 kg (212 lb 9.6 oz)   SpO2 (P) 96%   BMI 35.38 kg/m²     Neck circumference:45      cm    Loud snoring:                                                 YES            Witnessed apnea or wakening gasping or choking:        DENIES         Awakens with headaches:                                               DENIES  Morning or daytime tiredness/ sleepiness:                            TIRED  Dry mouth or sore throat in morning:            YES                                              Sandy stage:  4                                   SACS score:35  Stop Bang   STOP-BANG  Does the patient snore loudly (louder than talking or loud enough to be heard through closed doors)?: Yes  Does the patient often feel tired, fatigued, or sleepy during the daytime, even after a \"good\" night's sleep?: Yes  Has anyone ever observed the patient stop breathing during their sleep? : No  Does the patient have or are they being treated for high blood pressure?: Yes  Is the patient's BMI greater than 35?: Yes  Is your neck circumference greater than 17 inches (Male) or 16 inches (Female)?: Yes  Is the patient older than 48?: Yes  Is the patient male?: Yes  NATANAEL Score: 7  Has the patient been referred to Sleep Medicine?: No  Has the patient previously been diagnosed with Obstructive Sleep Apnea?: No  POST OP SHOULDER SURGERY  CONTINUOUS SAT MONITOR UPON ARRIVAL TO ROOM. AIRVO FOR LUNG EXPANSION FOR 24 HOURS UPON ARRIVAL TO ROOM. RESPIRATORY ASSESSMENT Q SHIFT.     IF PT USES CPAP AT HOME, PT WILL NEED ASSISTANCE PLACING CPAP ON HS  DURING HOSPITALIZATION. Referrals:  DECLINED  Pt.  Phone Number:

## 2021-12-29 NOTE — PERIOP NOTES
PLEASE CONTINUE TAKING ALL PRESCRIPTION MEDICATIONS UP TO THE DAY OF SURGERY UNLESS OTHERWISE DIRECTED BELOW. DISCONTINUE all vitamins and supplements 7 days prior to surgery. DISCONTINUE Non-Steriodal Anti-Inflammatory (NSAIDS) such as Advil and Aleve 5 days prior to surgery. Home Medications to take  the day of surgery   Aspirin  Metoprolol           Home Medications   to Hold   Eliquis per Dr. Rian Garibay instruction        Comments    Covid test 01/03/2022 @  10:45       [address]    On the day before surgery please take Acetaminophen 1000mg in the morning and then again before bed. You may substitute for Tylenol 650 mg. Please bring bottle of soap (Dynahex) and incentive spirometer to the hospital on the day of surgery. Please do not bring home medications with you on the day of surgery unless otherwise directed by your nurse. If you are instructed to bring home medications, please give them to your nurse as they will be administered by the nursing staff. If you have any questions, please call Allison Bourne [phone] copy of this note was provided to the patient for reference.

## 2021-12-29 NOTE — PERIOP NOTES
Patient verified name and     Order for consent was found in EHR and matches case posting; patient verified. Type 3 surgery, PAT walk in assessment complete. Labs per surgeon: CBC,CMP,PT/PTT, MRSA swab, Magnesium; results pending  Labs per anesthesia protocol: none  EKG: Done 09/10/2021 at Dr. Janice Soulier office and will have anesthesia to review    Pt has known history of a fib and is followed by Dr. Bernardo Haddad of [facility]. Last office note 09/10/2021 found in Chart Review and states following:  Chronic atrial fibrillation ([address] - This is rate controlled and anticoagulated. HR 47bpm at times but well controlled otherwise. Eliquis to 5mg BID. Hgb reviewed and stable. Last ECHO 2021 and states the following:  Interpretation Summary    · LV: Estimated LVEF is 60 - 65%. Normal cavity size and systolic function (ejection fraction normal). Mild concentric hypertrophy. · LA: Moderately dilated left atrium. · RA: Moderately dilated right atrium. · MV: Mild mitral valve regurgitation is present. · TV: Mild tricuspid valve regurgitation is present. Right Ventricular Arterial Pressure (RVSP) is 44 mmHg. Pulmonary hypertension found to be mild. Pt has had stents x 2 in 2010 in LAD    The following note from Dr. Romero Records found in Chart Review:  Max Trujillo MD  to [facility]    4:38 PM  Note  Patient is low risk and may proceed. Would recommend holding Eliquis 72 hours prior to procedure resuming postop day 1. Patient COVID test date 2021 @ 10:45 ; Patient is aware of  the appointment. The testing center is located at the [address], [place]. If appointment is needed patient provided telephone number of [phone]. Pt instructed to return the day prior to surgery to [address] [facility] [address] to have blood drawn for T&S and to put BB armband on non operative arm and do not remove!  Verbalized understanding. Hospital approved surgical skin cleanser and instructions given per hospital policy. Patient provided with and instructed on educational handouts including Guide to Surgery, Pain Management, Hand Hygiene, Blood Transfusion Education, and [facility] Brochure. Patient answered medical/surgical history questions at their best of ability. All prior to admission medications documented in [facility]. Original medication prescription bottle not visualized during patient appointment. Patient instructed to hold all vitamins 7 days prior to surgery and NSAIDS 5 days prior to surgery, patient verbalized understanding. Patient teach back successful and patient demonstrates knowledge of instructions.

## 2021-12-30 NOTE — PERIOP NOTES
Dr. Hayes Pappas reviewed chart - EKG 09/10/21 & Cardiology note 09/10/21 and PT 14.9. No new order received. Ok to proceed.

## 2022-01-04 PROBLEM — M19.011 OSTEOARTHRITIS OF RIGHT GLENOHUMERAL JOINT: Status: ACTIVE | Noted: 2022-01-04

## 2022-01-04 NOTE — ADVANCED PRACTICE NURSE
Total Joint Surgery Preoperative Chart Review      Patient ID:  Kylee Poe  [id number]  25 y.o.  1940  Surgeon: Dr. Leonel Kimble  Date of Surgery: 1/6/2022  Procedure: Total Right Shoulder Arthroplasty  Primary Care Physician: Arti Peterson [phone]  Specialty Physician(s):      Subjective:   Kylee Poe is a 80 y.o. WHITE/NON- male who presents for preoperative evaluation for Total Right Shoulder arthroplasty. This is a preoperative chart review note based on data collected by the nurse at the surgical Pre-Assessment visit.     Past Medical History:   Diagnosis Date    Arrhythmia     AFlutter/SVT    Arthritis     CAD (coronary artery disease) 2010    2 xstents, Followed by Dr. Linnette Alberto Chronic kidney disease     Stage III    Coagulation disorder ([address]     eliquis/plavix    Coronary atherosclerosis of native coronary artery 5/27/2010    2010:  PCI LAD Xience x 2    Diabetes ([address] type 2    Controlled with diet     Dyslipidemia 5/27/2010    Essential hypertension, benign      History of coronary artery stent placement 2010    LAC, LAD stented    History of kidney stones     History of prior ablation treatment 2014    due to atrial fib    Kidney disease     Was seen by [facility] nephrology after sepsis for acute Kidney disease, has since been released    Kidney stone     Paroxysmal atrial fibrillation ([address] 10/2/2015    Platelet inhibition due to Plavix     Post PTCA     WITH STENT    Sepsis ([address]     Shingles outbreak     Syncope and collapse 07/25/2014      Past Surgical History:   Procedure Laterality Date    HX CATARACT REMOVAL Bilateral     HX CYST REMOVAL  years ago    scrotal    HX HEART CATHETERIZATION  2010    2 stents    ME CARDIAC SURG PROCEDURE UNLIST  2014    ablation     Family History   Problem Relation Age of Onset    Heart Disease Mother     Diabetes Father     Diabetes Brother     Diabetes Brother     Cancer Brother     Heart Disease Sister         mild heart attack      Social History     Tobacco Use    Smoking status: Never Smoker    Smokeless tobacco: Never Used   Substance Use Topics    Alcohol use: No       Prior to Admission medications    Medication Sig Start Date End Date Taking? Authorizing Provider   clotrimazole-betamethasone (LOTRISONE) topical cream Apply  to affected area two (2) times a day. 10/11/21  Yes Provider, Historical   Eliquis 5 mg tablet TAKE 1 TABLET BY MOUTH  TWICE DAILY 11/1/21  Yes Mireille Walton MD   ganciclovir 0.15% (Zirgan) 0.15 % gel Administer 1 Drop to left eye every evening. Yes Provider, Historical   aspirin delayed-release 81 mg tablet Take 1 Tab by mouth every twelve (12) hours every twelve (12) hours. DISCONTINUE ONCE RESTARTED ON ELIQUIS. Patient taking differently: Take 81 mg by mouth daily. DISCONTINUE ONCE RESTARTED ON ELIQUIS. 8/12/20  Yes Britany Roberts PA   acetaminophen (TylenoL) 325 mg tablet Take 500 mg by mouth every four (4) hours as needed for Pain. Yes Provider, Historical   losartan (COZAAR) 100 mg tablet Take 1 Tab by mouth daily. 3/22/19  Yes Mireille Walton MD   pravastatin (PRAVACHOL) 40 mg tablet Take 1 Tab by mouth nightly. 3/22/19  Yes Mireille Walton MD   metoprolol tartrate (LOPRESSOR) 25 mg tablet Take 0.5 Tabs by mouth two (2) times a day. Indications: VENTRICULAR RATE CONTROL IN ATRIAL FIBRILLATION  Patient taking differently: Take 12.5 mg by mouth daily. Indications: ventricular rate control in atrial fibrillation 1/27/17  Yes Tanner Cook MD   nitroglycerin (NITROSTAT) 0.4 mg SL tablet 0.4 mg by SubLINGual route every five (5) minutes as needed for Chest Pain. up to 3 doses - call 911 if chest pain persists. Other, MD Kboe     Allergies   Allergen Reactions    Tramadol Drowsiness          Objective:     Physical Exam:   No data found.     ECG:    EKG Results     None          Data Review:   Labs:   No results found for this or any previous visit (from the past 24 hour(s)). Problem List:  )  Patient Active Problem List   Diagnosis Code    Atherosclerosis of native coronary artery of native heart with stable angina pectoris (HCC) I25.118    Essential hypertension, benign I10    Dyslipidemia E78.5    WPW (Regi-Parkinson-White syndrome) I45.6    Acute pyelonephritis N10    Sepsis ([address] A41.9    Ureteral stone N20.1    ANNA (acute kidney injury) ([address] N17.9    Acute on chronic kidney disease, stage 4 N18.4    Obstructed, uropathy N13.9    Hypotension I95.9    Bacteremia due to Gram-negative bacteria R78.81    Chronic atrial fibrillation (HCC) I48.20    Bilateral carotid artery stenosis I65.23    Arthritis of left hip M16.12    Urinary retention R33.9    Osteoarthritis of right glenohumeral joint M19.011       Total Joint Surgery Pre-Assessment Recommendations:           Continuous saturation monitoring UPON ARRIVAL TO FLOOR.   CPAP  qhs or Heated high flow lung expansion x 24 hours and prn   IP RT consult for respiratory evaluation every shift        Signed By: ELMER Murrell    January 4, 2022

## 2022-01-04 NOTE — H&P
Subjective:     Patient is a 80 y.o. RHD MALE WITH RIGHT SHOULDER PAIN. SEE OFFICE NOTE.     Patient Active Problem List    Diagnosis Date Noted    Osteoarthritis of right glenohumeral joint 01/04/2022    Urinary retention 08/19/2020    Arthritis of left hip 08/11/2020    Bilateral carotid artery stenosis 07/28/2017    Chronic atrial fibrillation ([address] 10/02/2015    Bacteremia due to Gram-negative bacteria 06/28/2015    Acute on chronic kidney disease, stage 4 06/27/2015    Obstructed, uropathy 06/27/2015    Hypotension 06/27/2015    Acute pyelonephritis 06/26/2015    Sepsis ([address] 06/26/2015    Ureteral stone 06/26/2015    ANNA (acute kidney injury) ([address] 06/26/2015    WPW (Regi-Parkinson-White syndrome) 10/10/2014    Atherosclerosis of native coronary artery of native heart with stable angina pectoris ([address] 05/27/2010    Essential hypertension, benign 05/27/2010    Dyslipidemia 05/27/2010     Past Medical History:   Diagnosis Date    Arrhythmia     AFlutter/SVT    Arthritis     CAD (coronary artery disease) 2010    2 xstents, Followed by Dr. Dulce Diaz Chronic kidney disease     Stage III    Coagulation disorder ([address]     eliquis/plavix    Coronary atherosclerosis of native coronary artery 5/27/2010    2010:  PCI LAD Xience x 2    Diabetes ([address] type 2    Controlled with diet     Dyslipidemia 5/27/2010    Essential hypertension, benign      History of coronary artery stent placement 2010    LAC, LAD stented    History of kidney stones     History of prior ablation treatment 2014    due to atrial fib    Kidney disease     Was seen by [place] nephrology after sepsis for acute Kidney disease, has since been released    Kidney stone     Paroxysmal atrial fibrillation ([address] 10/2/2015    Platelet inhibition due to Plavix     Post PTCA     WITH STENT    Sepsis ([address]     Shingles outbreak     Syncope and collapse 07/25/2014      Past Surgical History:   Procedure Laterality Date    HX CATARACT REMOVAL Bilateral     HX CYST REMOVAL  years ago    scrotal    HX HEART CATHETERIZATION  2010    2 stents    IN CARDIAC SURG PROCEDURE UNLIST  2014    ablation      Prior to Admission medications    Medication Sig Start Date End Date Taking? Authorizing Provider   clotrimazole-betamethasone (LOTRISONE) topical cream Apply  to affected area two (2) times a day. 10/11/21   Provider, Historical   Eliquis 5 mg tablet TAKE 1 TABLET BY MOUTH  TWICE DAILY 11/1/21   Lauren Beasley MD   ganciclovir 0.15% (Zirgan) 0.15 % gel Administer 1 Drop to left eye every evening. Provider, Historical   aspirin delayed-release 81 mg tablet Take 1 Tab by mouth every twelve (12) hours every twelve (12) hours. DISCONTINUE ONCE RESTARTED ON ELIQUIS. Patient taking differently: Take 81 mg by mouth daily. DISCONTINUE ONCE RESTARTED ON ELIQUIS. 8/12/20   King Roberts PA   acetaminophen (TylenoL) 325 mg tablet Take 500 mg by mouth every four (4) hours as needed for Pain. Provider, Historical   losartan (COZAAR) 100 mg tablet Take 1 Tab by mouth daily. 3/22/19   Lauren Beasley MD   pravastatin (PRAVACHOL) 40 mg tablet Take 1 Tab by mouth nightly. 3/22/19   Lauren Beasley MD   metoprolol tartrate (LOPRESSOR) 25 mg tablet Take 0.5 Tabs by mouth two (2) times a day. Indications: VENTRICULAR RATE CONTROL IN ATRIAL FIBRILLATION  Patient taking differently: Take 12.5 mg by mouth daily. Indications: ventricular rate control in atrial fibrillation 1/27/17   Dmitri Hamlin MD   nitroglycerin (NITROSTAT) 0.4 mg SL tablet 0.4 mg by SubLINGual route every five (5) minutes as needed for Chest Pain. up to 3 doses - call 911 if chest pain persists.     Other, MD Kobe     Allergies   Allergen Reactions    Tramadol Drowsiness      Social History     Tobacco Use    Smoking status: Never Smoker    Smokeless tobacco: Never Used   Substance Use Topics    Alcohol use: No      Family History   Problem Relation Age of Onset    Heart Disease Mother     Diabetes Father     Diabetes Brother     Diabetes Brother     Cancer Brother     Heart Disease Sister         mild heart attack      Review of Systems  A comprehensive review of systems was negative except for that written in the HPI. Objective:     No data found. There were no vitals taken for this visit. General:  Alert, cooperative, no distress, appears stated age. Head:  Normocephalic, without obvious abnormality, atraumatic. Back:   Symmetric, no curvature. ROM normal. No CVA tenderness. Lungs:   Clear to auscultation bilaterally. Chest wall:  No tenderness or deformity. Heart:  Regular rate and rhythm, S1, S2 normal, no murmur, click, rub or gallop. Extremities: Extremities normal, atraumatic, no cyanosis or edema. Pulses: 2+ and symmetric all extremities. Skin: Skin color, texture, turgor normal. No rashes or lesions   Lymph nodes: Cervical, supraclavicular, and axillary nodes normal.   Neurologic: CNII-XII intact. Normal strength, sensation and reflexes throughout. Assessment:     Principal Problem:    Osteoarthritis of right glenohumeral joint (1/4/2022)        Plan:     The various methods of treatment have been discussed with the patient and family. PATIENT HAS EXHAUSTED NON-OPERATIVE MODALITIES     After consideration of risks, benefits and other options for treatment, the patient has consented to surgical intervention.     SEE OFFICE NOTE    Nitza Rendon MD

## 2022-01-04 NOTE — H&P
Name: Doron Steel  YOB: 1940  Gender: male  MRN: [id number]      What: Right shoulder pain  How: Insidious onset  When: Long history    Referring provider: Dr. Juana Brar    HPI: Doron Steel is a 80 y.o. right-hand-dominant gentleman seen at the request of Courtney Casanova for right shoulder problems. He notes a long history of right shoulder pain which is pretty severe. He had a right shoulder injection by  1 year ago which gave him transient relief of pain. He has had a left total hip arthroplasty. He has known osteoarthritis in both knees receiving injections. He has a history of hypertensive coronary artery disease status post 2 stents. He has atrial fibrillation on Eliquis and aspirin. Hypercholesterolemia. Diabetes mellitus. Chronic renal disease. He notes that the right shoulder really hurts him. It is affecting his quality life. He can barely use his right arm at all. ROS/Meds/PSH/PMH/FH/SH: A ten system review of systems was performed and is negative other than what is in the HPI. Tobacco:  reports that he has never smoked. He has never used smokeless tobacco.  There were no vitals taken for this visit. Physical Examination:  He is awake alert pleasant gentleman ambulating with a cane  He has restricted range of cervical spine motion without radicular findings    The left shoulder has 0 to 180 degrees of active and 0 to 180 degrees passive forward elevation. Internal rotation is to T6. External rotation is to 60 degrees at the side. In the 90 degree abducted position 90 degrees of external and 90 degrees internal rotation  The AC joint is non-tender  SC joint is non-tender. Greater tuberosity is non-tender. negative biceps  Negative O'Briens sign  negative lift-off sign  Negative belly press sign  Negative bear huggers sign  negative drop sign  negative hornblower's sign  No posterior glenohumeral joint line tenderness.    No evident excessive external rotation  Rotator cuff strength is 5/5.  negative external rotation stress test.   Negative empty can sign  There is no evident anterior or posterior apprehension with a negative sulcus sign. No instability  negative external and internal Rotation lag sign  Neurovascularly intact. His right shoulder is very limited due to pain. Active forward elevation is 0-40  Passively goes 0-90  ER to 0 degrees  IR to GT  Marked posterior glenohumeral joint line tenderness  Marked weakness  Deltoid does fire  He appears neurovascularly intact  Exam is limited due to pain    Data Reviewed:          XR: AP AP scapular outlet throwers and axillary views right shoulder   Clinical Indication  No diagnosis found. Report: AP AP scapular outlet throwers and axillary views right shoulder demonstrate a type II acromion degenerative changes in the [facility] joint marked degenerative changes in the glenohumeral joint with a large goats beard deformity complete obliteration of glenohumeral joint space posterior glenoid wear with a Nielsen B2 biconcave glenoid and 30% posterior subluxation. This has demonstrated significant progression since his x-rays of his right shoulder last year    Impression: End-stage glenohumeral osteoarthritis right shoulder  AC OA right shoulder   Bob Li., MD                    Impression:   1. Osteoarthritis of right glenohumeral joint    2. 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 I discussed nonoperative versus operative intervention and injections.   Essentially he has 1 of 2 options. Either he lives with his right shoulder the way it is or we consider a right total shoulder arthroplasty. He is not a good candidate for injections. He has multiple medical issues including diabetes mellitus and the last injection did not work or last.  I had a lengthy discussion with the patient and his wife. He cannot live with the shoulder the way it is. So in my opinion given his age, his activity level, his pathology, his deformity, he has exhausted nonoperative modalities. He would be a candidate for a reverse right total shoulder arthroplasty with a delta xtend prosthesis biceps tenodesis. This will be performed utilizing general anesthesia with interscalene block and I would keep him overnight at least 23 hours for observation. I would measure a hemoglobin A1c and a fasting blood glucose. I discussed the risks and benefits of the procedure with him and expected outcomes particularly in light of his significant medical comorbidities. Given his deformity I believe a reverse right total shoulder arthroplasty is his best option to relieve pain and improve function. Prior to undertaking this procedure we do need cardiac clearance. He can stay on his Eliquis and aspirin. The risk of medical issues i.e. cardiac issues is higher than the risk of bleeding at this point. Given his multiple medical issues he is at increased risk for postoperative complications including DEAth. I discussed the risks and benefits in detail.   We will proceed pending cardiac clearance  5 This is a chronic illness with severe exacerbation and progression    M 19.011    Dax Barnard MD

## 2022-01-05 ENCOUNTER — ANESTHESIA EVENT (OUTPATIENT)
Dept: SURGERY | Age: 82
End: 2022-01-05
Payer: MEDICARE

## 2022-01-05 ENCOUNTER — HOSPITAL ENCOUNTER (OUTPATIENT)
Dept: LAB | Age: 82
Discharge: HOME | End: 2022-01-05
Payer: MEDICARE

## 2022-01-05 PROCEDURE — 36415 COLL VENOUS BLD VENIPUNCTURE: CPT

## 2022-01-05 PROCEDURE — 86900 BLOOD TYPING SEROLOGIC ABO: CPT

## 2022-01-05 PROCEDURE — 86923 COMPATIBILITY TEST ELECTRIC: CPT

## 2022-01-06 ENCOUNTER — HOME HEALTH ADMISSION (OUTPATIENT)
Dept: HOME HEALTH SERVICES | Facility: HOME HEALTH | Age: 82
End: 2022-01-06
Payer: MEDICARE

## 2022-01-06 ENCOUNTER — APPOINTMENT (OUTPATIENT)
Dept: GENERAL RADIOLOGY | Age: 82
End: 2022-01-06
Attending: ORTHOPAEDIC SURGERY
Payer: MEDICARE

## 2022-01-06 ENCOUNTER — ANESTHESIA (OUTPATIENT)
Dept: SURGERY | Age: 82
End: 2022-01-06
Payer: MEDICARE

## 2022-01-06 ENCOUNTER — HOSPITAL ENCOUNTER (OUTPATIENT)
Age: 82
Discharge: HOME HEALTH | End: 2022-01-07
Attending: ORTHOPAEDIC SURGERY | Admitting: ORTHOPAEDIC SURGERY
Payer: MEDICARE

## 2022-01-06 DIAGNOSIS — M19.011 OSTEOARTHRITIS OF RIGHT GLENOHUMERAL JOINT: ICD-10-CM

## 2022-01-06 DIAGNOSIS — M19.011 OSTEOARTHRITIS OF GLENOHUMERAL JOINT, RIGHT: Primary | ICD-10-CM

## 2022-01-06 PROBLEM — D62 POSTOPERATIVE ANEMIA DUE TO ACUTE BLOOD LOSS: Status: ACTIVE | Noted: 2022-01-06

## 2022-01-06 LAB
EST. AVERAGE GLUCOSE BLD GHB EST-MCNC: 146 MG/DL
GLUCOSE BLD STRIP.AUTO-MCNC: 124 MG/DL (ref 65–100)
HBA1C MFR BLD: 6.7 % (ref 4.2–5.8)
HISTORY CHECKED?,CKHIST: NORMAL
SERVICE CMNT-IMP: ABNORMAL

## 2022-01-06 PROCEDURE — 77010033711 HC HIGH FLOW OXYGEN

## 2022-01-06 PROCEDURE — 74011000250 HC RX REV CODE- 250: Performed by: ORTHOPAEDIC SURGERY

## 2022-01-06 PROCEDURE — 77030012547: Performed by: ORTHOPAEDIC SURGERY

## 2022-01-06 PROCEDURE — 77030021668 HC NEB PREFIL KT VYRM -A

## 2022-01-06 PROCEDURE — 74011250636 HC RX REV CODE- 250/636: Performed by: STUDENT IN AN ORGANIZED HEALTH CARE EDUCATION/TRAINING PROGRAM

## 2022-01-06 PROCEDURE — C1713 ANCHOR/SCREW BN/BN,TIS/BN: HCPCS | Performed by: ORTHOPAEDIC SURGERY

## 2022-01-06 PROCEDURE — 74011250636 HC RX REV CODE- 250/636: Performed by: NURSE ANESTHETIST, CERTIFIED REGISTERED

## 2022-01-06 PROCEDURE — 77030040922 HC BLNKT HYPOTHRM STRY -A: Performed by: STUDENT IN AN ORGANIZED HEALTH CARE EDUCATION/TRAINING PROGRAM

## 2022-01-06 PROCEDURE — 77030040361 HC SLV COMPR DVT MDII -B: Performed by: ORTHOPAEDIC SURGERY

## 2022-01-06 PROCEDURE — 77030031139 HC SUT VCRL2 J&J -A: Performed by: ORTHOPAEDIC SURGERY

## 2022-01-06 PROCEDURE — A4565 SLINGS: HCPCS | Performed by: ORTHOPAEDIC SURGERY

## 2022-01-06 PROCEDURE — 77030020163 HC SEAL HEMSTAT HALY -B: Performed by: ORTHOPAEDIC SURGERY

## 2022-01-06 PROCEDURE — 76060000034 HC ANESTHESIA 1.5 TO 2 HR: Performed by: ORTHOPAEDIC SURGERY

## 2022-01-06 PROCEDURE — 74011250637 HC RX REV CODE- 250/637: Performed by: STUDENT IN AN ORGANIZED HEALTH CARE EDUCATION/TRAINING PROGRAM

## 2022-01-06 PROCEDURE — 76010000162 HC OR TIME 1.5 TO 2 HR INTENSV-TIER 1: Performed by: ORTHOPAEDIC SURGERY

## 2022-01-06 PROCEDURE — 74011250636 HC RX REV CODE- 250/636: Performed by: ORTHOPAEDIC SURGERY

## 2022-01-06 PROCEDURE — 23472 RECONSTRUCT SHOULDER JOINT: CPT | Performed by: ORTHOPAEDIC SURGERY

## 2022-01-06 PROCEDURE — G0378 HOSPITAL OBSERVATION PER HR: HCPCS

## 2022-01-06 PROCEDURE — 76010010054 HC POST OP PAIN BLOCK: Performed by: ORTHOPAEDIC SURGERY

## 2022-01-06 PROCEDURE — 77030035643 HC BLD SAW OSC PRECIS STRY -C: Performed by: ORTHOPAEDIC SURGERY

## 2022-01-06 PROCEDURE — 77030004434 HC BUR RND STRY -B: Performed by: ORTHOPAEDIC SURGERY

## 2022-01-06 PROCEDURE — 77030020268 HC MISC GENERAL SUPPLY: Performed by: ORTHOPAEDIC SURGERY

## 2022-01-06 PROCEDURE — 77030037088 HC TUBE ENDOTRACH ORAL NSL COVD-A: Performed by: STUDENT IN AN ORGANIZED HEALTH CARE EDUCATION/TRAINING PROGRAM

## 2022-01-06 PROCEDURE — 94762 N-INVAS EAR/PLS OXIMTRY CONT: CPT

## 2022-01-06 PROCEDURE — 77030012793 HC CIRC VNTLTR FISP -B

## 2022-01-06 PROCEDURE — 77030011283 HC ELECTRD NDL COVD -A: Performed by: ORTHOPAEDIC SURGERY

## 2022-01-06 PROCEDURE — 76942 ECHO GUIDE FOR BIOPSY: CPT | Performed by: ORTHOPAEDIC SURGERY

## 2022-01-06 PROCEDURE — 74011250637 HC RX REV CODE- 250/637: Performed by: INTERNAL MEDICINE

## 2022-01-06 PROCEDURE — 83036 HEMOGLOBIN GLYCOSYLATED A1C: CPT

## 2022-01-06 PROCEDURE — 77030002986 HC SUT PROL J&J -A: Performed by: ORTHOPAEDIC SURGERY

## 2022-01-06 PROCEDURE — 73030 X-RAY EXAM OF SHOULDER: CPT

## 2022-01-06 PROCEDURE — 77030039425 HC BLD LARYNG TRULITE DISP TELE -A: Performed by: STUDENT IN AN ORGANIZED HEALTH CARE EDUCATION/TRAINING PROGRAM

## 2022-01-06 PROCEDURE — C1776 JOINT DEVICE (IMPLANTABLE): HCPCS | Performed by: ORTHOPAEDIC SURGERY

## 2022-01-06 PROCEDURE — 77030003602 HC NDL NRV BLK BBMI -B: Performed by: STUDENT IN AN ORGANIZED HEALTH CARE EDUCATION/TRAINING PROGRAM

## 2022-01-06 PROCEDURE — 74011250637 HC RX REV CODE- 250/637: Performed by: ORTHOPAEDIC SURGERY

## 2022-01-06 PROCEDURE — 77030003827 HC BIT DRL J&J -B: Performed by: ORTHOPAEDIC SURGERY

## 2022-01-06 PROCEDURE — 74011000250 HC RX REV CODE- 250: Performed by: NURSE ANESTHETIST, CERTIFIED REGISTERED

## 2022-01-06 PROCEDURE — 74011250636 HC RX REV CODE- 250/636

## 2022-01-06 PROCEDURE — 2709999900 HC NON-CHARGEABLE SUPPLY: Performed by: ORTHOPAEDIC SURGERY

## 2022-01-06 PROCEDURE — 74011000272 HC RX REV CODE- 272: Performed by: ORTHOPAEDIC SURGERY

## 2022-01-06 PROCEDURE — 76210000001 HC OR PH I REC 2.5 TO 3 HR: Performed by: ORTHOPAEDIC SURGERY

## 2022-01-06 PROCEDURE — 74011000258 HC RX REV CODE- 258: Performed by: ORTHOPAEDIC SURGERY

## 2022-01-06 PROCEDURE — 82962 GLUCOSE BLOOD TEST: CPT

## 2022-01-06 DEVICE — SCREW BNE L26MM DIA4.5MM PROX CORT TIB S STL ST LOK FULL: Type: IMPLANTABLE DEVICE | Site: SHOULDER | Status: FUNCTIONAL

## 2022-01-06 DEVICE — SCREW BNE L34MM DIA4.5MM PROX CORT TIB S STL ST LOK FULL: Type: IMPLANTABLE DEVICE | Site: SHOULDER | Status: FUNCTIONAL

## 2022-01-06 DEVICE — CEMENT BNE FL 20ML MONMR 40GM -- SIMPLEX P: Type: IMPLANTABLE DEVICE | Site: SHOULDER | Status: FUNCTIONAL

## 2022-01-06 DEVICE — DELTA XTEND HUMERAL SPACER / +9MM
Type: IMPLANTABLE DEVICE | Site: SHOULDER | Status: FUNCTIONAL
Brand: DELTA XTEND

## 2022-01-06 DEVICE — DELTA XTEND MONOBLOC HUM CEMENTED EPIPHYSIS SZ2 /DIA 10 STD
Type: IMPLANTABLE DEVICE | Site: SHOULDER | Status: FUNCTIONAL
Brand: DELTA XTEND

## 2022-01-06 DEVICE — DELTA XTEND STANDARD HUMERAL PE CUP DIA38 /+3MM STD
Type: IMPLANTABLE DEVICE | Site: SHOULDER | Status: FUNCTIONAL
Brand: DELTA XTEND

## 2022-01-06 DEVICE — DELTA XTEND CEMENTLESS METAGLENE +10MM HA
Type: IMPLANTABLE DEVICE | Site: SHOULDER | Status: FUNCTIONAL
Brand: DELTA XTEND

## 2022-01-06 DEVICE — SCREW BNE L36MM DIA4.5MM PROX CORT TIB S STL ST LOK FULL: Type: IMPLANTABLE DEVICE | Site: SHOULDER | Status: FUNCTIONAL

## 2022-01-06 DEVICE — SHOULDER S3 TOT ADV REVERSE IMPL CAPPED S3: Type: IMPLANTABLE DEVICE | Status: FUNCTIONAL

## 2022-01-06 DEVICE — DELTA XTEND LATERALIZED GLENOSPHERE +6MM ECCENTRIC 038MM
Type: IMPLANTABLE DEVICE | Site: SHOULDER | Status: FUNCTIONAL
Brand: DELTA XTEND

## 2022-01-06 DEVICE — BIOSTOP G BIORESORBABLE CEMENT RESTRICTOR SIZE 10
Type: IMPLANTABLE DEVICE | Site: SHOULDER | Status: FUNCTIONAL
Brand: BIOSTOP

## 2022-01-06 RX ORDER — SODIUM CHLORIDE 0.9 % (FLUSH) 0.9 %
5-40 SYRINGE (ML) INJECTION AS NEEDED
Status: DISCONTINUED | OUTPATIENT
Start: 2022-01-06 | End: 2022-01-07 | Stop reason: HOSPADM

## 2022-01-06 RX ORDER — DEXAMETHASONE SODIUM PHOSPHATE 100 MG/10ML
INJECTION INTRAMUSCULAR; INTRAVENOUS AS NEEDED
Status: DISCONTINUED | OUTPATIENT
Start: 2022-01-06 | End: 2022-01-06 | Stop reason: HOSPADM

## 2022-01-06 RX ORDER — FENTANYL CITRATE 50 UG/ML
100 INJECTION, SOLUTION INTRAMUSCULAR; INTRAVENOUS ONCE
Status: COMPLETED | OUTPATIENT
Start: 2022-01-06 | End: 2022-01-06

## 2022-01-06 RX ORDER — HYDROGEN PEROXIDE 3 %
SOLUTION, NON-ORAL MISCELLANEOUS AS NEEDED
Status: DISCONTINUED | OUTPATIENT
Start: 2022-01-06 | End: 2022-01-06 | Stop reason: HOSPADM

## 2022-01-06 RX ORDER — SODIUM CHLORIDE 0.9 % (FLUSH) 0.9 %
5-40 SYRINGE (ML) INJECTION EVERY 8 HOURS
Status: DISCONTINUED | OUTPATIENT
Start: 2022-01-06 | End: 2022-01-06 | Stop reason: HOSPADM

## 2022-01-06 RX ORDER — HYDROMORPHONE HYDROCHLORIDE 2 MG/1
2 TABLET ORAL
Status: DISCONTINUED | OUTPATIENT
Start: 2022-01-06 | End: 2022-01-07 | Stop reason: HOSPADM

## 2022-01-06 RX ORDER — SODIUM CHLORIDE, SODIUM LACTATE, POTASSIUM CHLORIDE, CALCIUM CHLORIDE 600; 310; 30; 20 MG/100ML; MG/100ML; MG/100ML; MG/100ML
100 INJECTION, SOLUTION INTRAVENOUS CONTINUOUS
Status: DISCONTINUED | OUTPATIENT
Start: 2022-01-06 | End: 2022-01-06 | Stop reason: HOSPADM

## 2022-01-06 RX ORDER — SODIUM CHLORIDE 0.9 % (FLUSH) 0.9 %
5-40 SYRINGE (ML) INJECTION AS NEEDED
Status: DISCONTINUED | OUTPATIENT
Start: 2022-01-06 | End: 2022-01-06 | Stop reason: HOSPADM

## 2022-01-06 RX ORDER — ROSUVASTATIN CALCIUM 5 MG/1
5 TABLET, COATED ORAL
Status: DISCONTINUED | OUTPATIENT
Start: 2022-01-06 | End: 2022-01-07 | Stop reason: HOSPADM

## 2022-01-06 RX ORDER — SODIUM CHLORIDE 0.9 % (FLUSH) 0.9 %
5-40 SYRINGE (ML) INJECTION EVERY 8 HOURS
Status: DISCONTINUED | OUTPATIENT
Start: 2022-01-06 | End: 2022-01-07 | Stop reason: HOSPADM

## 2022-01-06 RX ORDER — DOCUSATE SODIUM 100 MG/1
100 CAPSULE, LIQUID FILLED ORAL 2 TIMES DAILY
Status: DISCONTINUED | OUTPATIENT
Start: 2022-01-07 | End: 2022-01-07 | Stop reason: HOSPADM

## 2022-01-06 RX ORDER — SUCCINYLCHOLINE CHLORIDE 20 MG/ML
INJECTION INTRAMUSCULAR; INTRAVENOUS AS NEEDED
Status: DISCONTINUED | OUTPATIENT
Start: 2022-01-06 | End: 2022-01-06 | Stop reason: HOSPADM

## 2022-01-06 RX ORDER — AMLODIPINE BESYLATE 2.5 MG/1
2.5 TABLET ORAL DAILY
Status: DISCONTINUED | OUTPATIENT
Start: 2022-01-06 | End: 2022-01-07 | Stop reason: HOSPADM

## 2022-01-06 RX ORDER — METOPROLOL TARTRATE 25 MG/1
12.5 TABLET, FILM COATED ORAL 2 TIMES DAILY
Status: DISCONTINUED | OUTPATIENT
Start: 2022-01-06 | End: 2022-01-07 | Stop reason: HOSPADM

## 2022-01-06 RX ORDER — HYDROMORPHONE HYDROCHLORIDE 2 MG/1
4 TABLET ORAL
Status: DISCONTINUED | OUTPATIENT
Start: 2022-01-06 | End: 2022-01-07 | Stop reason: HOSPADM

## 2022-01-06 RX ORDER — LIDOCAINE HYDROCHLORIDE 10 MG/ML
0.1 INJECTION INFILTRATION; PERINEURAL AS NEEDED
Status: DISCONTINUED | OUTPATIENT
Start: 2022-01-06 | End: 2022-01-06 | Stop reason: HOSPADM

## 2022-01-06 RX ORDER — NALOXONE HYDROCHLORIDE 0.4 MG/ML
0.1 INJECTION, SOLUTION INTRAMUSCULAR; INTRAVENOUS; SUBCUTANEOUS
Status: DISCONTINUED | OUTPATIENT
Start: 2022-01-06 | End: 2022-01-06 | Stop reason: HOSPADM

## 2022-01-06 RX ORDER — OXYCODONE HYDROCHLORIDE 5 MG/1
5 TABLET ORAL
Status: COMPLETED | OUTPATIENT
Start: 2022-01-06 | End: 2022-01-06

## 2022-01-06 RX ORDER — NITROGLYCERIN 0.4 MG/1
0.4 TABLET SUBLINGUAL
Status: DISCONTINUED | OUTPATIENT
Start: 2022-01-06 | End: 2022-01-07 | Stop reason: HOSPADM

## 2022-01-06 RX ORDER — NALOXONE HYDROCHLORIDE 0.4 MG/ML
0.1 INJECTION, SOLUTION INTRAMUSCULAR; INTRAVENOUS; SUBCUTANEOUS AS NEEDED
Status: DISCONTINUED | OUTPATIENT
Start: 2022-01-06 | End: 2022-01-06 | Stop reason: HOSPADM

## 2022-01-06 RX ORDER — PROMETHAZINE HYDROCHLORIDE 25 MG/1
25 TABLET ORAL
Status: DISCONTINUED | OUTPATIENT
Start: 2022-01-06 | End: 2022-01-07 | Stop reason: HOSPADM

## 2022-01-06 RX ORDER — CEFAZOLIN SODIUM/WATER 2 G/20 ML
2 SYRINGE (ML) INTRAVENOUS ONCE
Status: COMPLETED | OUTPATIENT
Start: 2022-01-06 | End: 2022-01-06

## 2022-01-06 RX ORDER — EPHEDRINE SULFATE/0.9% NACL/PF 50 MG/5 ML
SYRINGE (ML) INTRAVENOUS AS NEEDED
Status: DISCONTINUED | OUTPATIENT
Start: 2022-01-06 | End: 2022-01-06 | Stop reason: HOSPADM

## 2022-01-06 RX ORDER — LOSARTAN POTASSIUM 50 MG/1
100 TABLET ORAL DAILY
Status: DISCONTINUED | OUTPATIENT
Start: 2022-01-06 | End: 2022-01-07 | Stop reason: HOSPADM

## 2022-01-06 RX ORDER — DIPHENHYDRAMINE HYDROCHLORIDE 50 MG/ML
12.5 INJECTION, SOLUTION INTRAMUSCULAR; INTRAVENOUS
Status: DISCONTINUED | OUTPATIENT
Start: 2022-01-06 | End: 2022-01-06 | Stop reason: HOSPADM

## 2022-01-06 RX ORDER — ASPIRIN 81 MG/1
81 TABLET ORAL EVERY 12 HOURS
Status: DISCONTINUED | OUTPATIENT
Start: 2022-01-06 | End: 2022-01-07 | Stop reason: HOSPADM

## 2022-01-06 RX ORDER — FLUMAZENIL 0.1 MG/ML
0.2 INJECTION INTRAVENOUS
Status: DISCONTINUED | OUTPATIENT
Start: 2022-01-06 | End: 2022-01-06 | Stop reason: HOSPADM

## 2022-01-06 RX ORDER — ROPIVACAINE HYDROCHLORIDE 5 MG/ML
INJECTION, SOLUTION EPIDURAL; INFILTRATION; PERINEURAL
Status: DISCONTINUED | OUTPATIENT
Start: 2022-01-06 | End: 2022-01-06 | Stop reason: HOSPADM

## 2022-01-06 RX ORDER — HYDROMORPHONE HYDROCHLORIDE 2 MG/ML
0.5 INJECTION, SOLUTION INTRAMUSCULAR; INTRAVENOUS; SUBCUTANEOUS
Status: DISCONTINUED | OUTPATIENT
Start: 2022-01-06 | End: 2022-01-06 | Stop reason: HOSPADM

## 2022-01-06 RX ORDER — SODIUM CHLORIDE 9 MG/ML
75 INJECTION, SOLUTION INTRAVENOUS CONTINUOUS
Status: DISPENSED | OUTPATIENT
Start: 2022-01-06 | End: 2022-01-07

## 2022-01-06 RX ORDER — LIDOCAINE HYDROCHLORIDE 20 MG/ML
INJECTION, SOLUTION EPIDURAL; INFILTRATION; INTRACAUDAL; PERINEURAL AS NEEDED
Status: DISCONTINUED | OUTPATIENT
Start: 2022-01-06 | End: 2022-01-06 | Stop reason: HOSPADM

## 2022-01-06 RX ORDER — ONDANSETRON 2 MG/ML
INJECTION INTRAMUSCULAR; INTRAVENOUS AS NEEDED
Status: DISCONTINUED | OUTPATIENT
Start: 2022-01-06 | End: 2022-01-06 | Stop reason: HOSPADM

## 2022-01-06 RX ORDER — MIDAZOLAM HYDROCHLORIDE 1 MG/ML
2 INJECTION, SOLUTION INTRAMUSCULAR; INTRAVENOUS ONCE
Status: DISCONTINUED | OUTPATIENT
Start: 2022-01-06 | End: 2022-01-06 | Stop reason: HOSPADM

## 2022-01-06 RX ORDER — FACIAL-BODY WIPES
10 EACH TOPICAL DAILY PRN
Status: DISCONTINUED | OUTPATIENT
Start: 2022-01-06 | End: 2022-01-07 | Stop reason: HOSPADM

## 2022-01-06 RX ORDER — DEXAMETHASONE SODIUM PHOSPHATE 4 MG/ML
INJECTION, SOLUTION INTRA-ARTICULAR; INTRALESIONAL; INTRAMUSCULAR; INTRAVENOUS; SOFT TISSUE
Status: DISCONTINUED | OUTPATIENT
Start: 2022-01-06 | End: 2022-01-06 | Stop reason: HOSPADM

## 2022-01-06 RX ORDER — LANOLIN ALCOHOL/MO/W.PET/CERES
1 CREAM (GRAM) TOPICAL 2 TIMES DAILY WITH MEALS
Status: DISCONTINUED | OUTPATIENT
Start: 2022-01-07 | End: 2022-01-07 | Stop reason: HOSPADM

## 2022-01-06 RX ORDER — PROPOFOL 10 MG/ML
INJECTION, EMULSION INTRAVENOUS AS NEEDED
Status: DISCONTINUED | OUTPATIENT
Start: 2022-01-06 | End: 2022-01-06 | Stop reason: HOSPADM

## 2022-01-06 RX ORDER — PRAVASTATIN SODIUM 20 MG/1
40 TABLET ORAL
Status: DISCONTINUED | OUTPATIENT
Start: 2022-01-06 | End: 2022-01-06

## 2022-01-06 RX ORDER — HYDROMORPHONE HYDROCHLORIDE 1 MG/ML
1 INJECTION, SOLUTION INTRAMUSCULAR; INTRAVENOUS; SUBCUTANEOUS
Status: DISCONTINUED | OUTPATIENT
Start: 2022-01-06 | End: 2022-01-07 | Stop reason: HOSPADM

## 2022-01-06 RX ADMIN — SODIUM CHLORIDE 75 ML/HR: 9 INJECTION, SOLUTION INTRAVENOUS at 14:00

## 2022-01-06 RX ADMIN — DEXAMETHASONE SODIUM PHOSPHATE 4 MG: 4 INJECTION, SOLUTION INTRAMUSCULAR; INTRAVENOUS at 08:31

## 2022-01-06 RX ADMIN — PROPOFOL 150 MG: 10 INJECTION, EMULSION INTRAVENOUS at 09:00

## 2022-01-06 RX ADMIN — Medication 2 G: at 09:06

## 2022-01-06 RX ADMIN — SODIUM CHLORIDE, PRESERVATIVE FREE 10 ML: 5 INJECTION INTRAVENOUS at 17:14

## 2022-01-06 RX ADMIN — ROPIVACAINE HYDROCHLORIDE 30 ML: 5 INJECTION, SOLUTION EPIDURAL; INFILTRATION; PERINEURAL at 08:31

## 2022-01-06 RX ADMIN — OXYCODONE 5 MG: 5 TABLET ORAL at 11:16

## 2022-01-06 RX ADMIN — ROSUVASTATIN CALCIUM 5 MG: 5 TABLET, COATED ORAL at 22:22

## 2022-01-06 RX ADMIN — Medication 5 MG: at 09:03

## 2022-01-06 RX ADMIN — Medication 5 MG: at 10:02

## 2022-01-06 RX ADMIN — DEXAMETHASONE SODIUM PHOSPHATE 5 MG: 10 INJECTION INTRAMUSCULAR; INTRAVENOUS at 09:11

## 2022-01-06 RX ADMIN — Medication 5 MG: at 09:12

## 2022-01-06 RX ADMIN — LIDOCAINE HYDROCHLORIDE 60 MG: 20 INJECTION, SOLUTION EPIDURAL; INFILTRATION; INTRACAUDAL; PERINEURAL at 09:00

## 2022-01-06 RX ADMIN — AMLODIPINE BESYLATE 2.5 MG: 2.5 TABLET ORAL at 17:13

## 2022-01-06 RX ADMIN — Medication 81 MG: at 19:49

## 2022-01-06 RX ADMIN — ONDANSETRON 4 MG: 2 INJECTION INTRAMUSCULAR; INTRAVENOUS at 09:11

## 2022-01-06 RX ADMIN — SODIUM CHLORIDE, SODIUM LACTATE, POTASSIUM CHLORIDE, AND CALCIUM CHLORIDE 100 ML/HR: 600; 310; 30; 20 INJECTION, SOLUTION INTRAVENOUS at 07:00

## 2022-01-06 RX ADMIN — CEFAZOLIN SODIUM 1 G: 1 INJECTION, POWDER, FOR SOLUTION INTRAMUSCULAR; INTRAVENOUS at 17:13

## 2022-01-06 RX ADMIN — Medication 5 MG: at 09:38

## 2022-01-06 RX ADMIN — SODIUM CHLORIDE, SODIUM LACTATE, POTASSIUM CHLORIDE, AND CALCIUM CHLORIDE: 600; 310; 30; 20 INJECTION, SOLUTION INTRAVENOUS at 09:48

## 2022-01-06 RX ADMIN — FENTANYL CITRATE 100 MCG: 50 INJECTION INTRAMUSCULAR; INTRAVENOUS at 08:28

## 2022-01-06 RX ADMIN — Medication 5 MG: at 09:45

## 2022-01-06 RX ADMIN — SUCCINYLCHOLINE CHLORIDE 140 MG: 20 INJECTION, SOLUTION INTRAMUSCULAR; INTRAVENOUS at 09:00

## 2022-01-06 RX ADMIN — Medication 5 MG: at 09:19

## 2022-01-06 NOTE — OP NOTES
[address] [address]  OPERATIVE REPORT    Name:  Joe Mazariegos  MR#:  [id number]  :  1940  ACCOUNT #:  [de-identified]  DATE OF SERVICE:  2022    PREOPERATIVE DIAGNOSIS:  End-stage glenohumeral osteoarthritis, right shoulder. POSTOPERATIVE DIAGNOSIS:  End-stage glenohumeral osteoarthritis, right shoulder. PROCEDURES PERFORMED:  Reverse right total shoulder arthroplasty with a Delta Xtend prosthesis, biceps tenodesis. SURGEON:  Mercedez Gonzales. Andie Reyes MD        ANESTHESIA:  General with an interscalene block. COMPLICATIONS:  None. IMPLANTS:  Hardware utilized is a DePuy +10 metaglene, 38 eccentric +6 mm lateralized glenosphere, 38+3 cup, +9 extender, 10.2 stem, 10 mm Biostop G, 36 mm superior, 34 mm inferior, and 26 mm anterior nonlocking cortical screw. ESTIMATED BLOOD LOSS:  75 mL. FINDINGS:  Severe end-stage glenohumeral osteoarthritis with a Walch B2 biconcave glenoid and marked bone loss. CPT CODE:  58888. ICD-10 CODE:  M19.011. INDICATIONS:  The patient is an 19-year-old gentleman who developed severe right shoulder pain. Preoperative physical exam and radiographs demonstrate end-stage glenohumeral osteoarthritis with a marked deformity with a Walch B2 biconcave glenoid, marked wear. The patient has exhausted nonoperative modalities and following preoperative medical clearance, he was brought to the operative suite for operative intervention. PROCEDURE:  Following identification of the patient, the patient was taken to the operative suite. Following administration of general anesthesia, interscalene block for postop pain control, 2 g of IV Ancef, the patient was very carefully positioned on the operative table in the beach-chair fashion. Right shoulder was then prepped and draped in the sterile fashion. A standard deltopectoral incision was identified and marked. InteguSeal was applied. Once the InteguSeal was allowed to cure, the skin was incised. Subcutaneous tissue was then dissected down to the cephalic vein. This was dissected proximally and distally, retracted laterally with deltoid. Pec major and strap muscle were retracted medially. Subdeltoid bursa was released. Axillary nerve was protected. The biceps tendon was identified. It was markedly tendinopathic. It was dissected up through rotator interval, tagged, transected for later tenodesis. Subscap was elevated sharply off lesser tuberosity in a subscapularis peel configuration and tagged for later repair. Humerus was then very carefully dislocated from the wound. There was a marked deformity with marked humeral head osteoarthritis, flattening of the humeral head, and significant glenoid wear. Proximal cutting guide was assembled. Proximal cut was then performed. Circumferential osteophytes were then resected. At this point, attention was then turned to the glenoid. The glenoid was then meticulously exposed. There was marked wear. Osteophytes circumferentially were then resected. The starter wire was then drilled. The glenoid was then drilled and reamed to a +10 depth. The high side was reamed down. The inferior tilt was reamed in as well. A +10 metaglene was inserted and secured with a 36 mm superior, 34 mm inferior, and 26 mm anterior nonlocking cortical screw. Metaglene was stable. A 38 eccentric +6 mm lateralized glenosphere was inserted and secured in the standard fashion. Metaglene and glenosphere were stable. Humerus was then dislocated back from the wound and reamed to accommodate a 10.2 stem. It was noted that a 10.2 stem with a +9 extender, +3 cup gave excellent stability and mobility. At this point, all trial components were then removed. The humeral canal was irrigated and dried. Cement was mixed. Cement was inserted in the humeral canal and the 10.2 stem was cemented in the appropriate version. Excessive cement was removed with a curette.   The fins of the prosthesis had been preloaded with #5 Mersilene sutures. Once the cement was allowed to cure, a true +9 extender with a true +3 cup was inserted. Shoulder was reduced. There was excellent stability with excellent mobility. At this point, the subscapularis was repaired back to the fin of the prosthesis using #5 Mersilene sutures in a modified Landen-Mikey type fashion. Rotator interval was approximated with #5 Mersilene sutures. Biceps was tenodesed using #5 Mersilene sutures. Arm was put through range of motion and stable. Axillary nerve was intact. The wound was then irrigated. Deltopectoral interval was approximated with #2 Mersilene suture. Skin was closed with 0 Vicryl figure-of-eight sutures and a 2-0 Prolene subcuticular stitch. A sterile dressing was applied. A sling and swathe was applied. The patient was then transferred to the recovery room in stable condition.       Reginald Baca MD      [id number]  D:  01/06/2022 10:36  T:  01/06/2022 13:51  JOB #:  [id number]  CC:  Richard Barnhart MD

## 2022-01-06 NOTE — ANESTHESIA PREPROCEDURE EVALUATION
Relevant Problems   CARDIOVASCULAR   (+) Atherosclerosis of native coronary artery of native heart with stable angina pectoris (HCC)   (+) Chronic atrial fibrillation (HCC)   (+) Essential hypertension, benign   (+) WPW (Regi-Parkinson-White syndrome)      RENAL FAILURE   (+) ANNA (acute kidney injury) ([facility] [address]   (+) Acute on chronic kidney disease, stage 4   (+) Acute pyelonephritis      ENDOCRINE   (+) Arthritis of left hip      HEMATOLOGY   (+) Postoperative anemia due to acute blood loss       Anesthetic History   No history of anesthetic complications            Review of Systems / Medical History  Pertinent labs reviewed    Pulmonary          Undiagnosed apnea         Neuro/Psych   Within defined limits           Cardiovascular    Hypertension: well controlled        Dysrhythmias (s/p SVT/A flutter ablation 2015) : atrial fibrillation, SVT and atrial flutter  CAD and cardiac stents (2010.)    Exercise tolerance: >4 METS  Comments: TTE 8/2021: · LV: Estimated LVEF is 60 - 65%. Normal cavity size and systolic function (ejection fraction normal). Mild concentric hypertrophy. Mild pulmonary hypertension       GI/Hepatic/Renal         Renal disease: stones and CRI       Endo/Other    Diabetes (diet controlled)    Obesity and arthritis     Other Findings            Physical Exam    Airway  Mallampati: II  TM Distance: 4 - 6 cm  Neck ROM: normal range of motion   Mouth opening: Normal     Cardiovascular    Rhythm: irregular  Rate: normal         Dental    Dentition: Full upper dentures     Pulmonary  Breath sounds clear to auscultation               Abdominal  GI exam deferred       Other Findings            Anesthetic Plan    ASA: 3  Anesthesia type: general      Post-op pain plan if not by surgeon: peripheral nerve block single    Induction: Intravenous  Anesthetic plan and risks discussed with: Patient and Family      Patient with preop pressure manual 140s/80s. By cuff ~284 systolic.  This has been consistent in his outpatient notes as well. He is not having headaches/vision changes. Discussed with surgeon need to maintain above normal blood pressures for patient. Goal MAP >80.

## 2022-01-06 NOTE — DISCHARGE SUMMARY
[facility] Discharge Summary      Patient ID:  Norma Ellsworth  [id number]  53 y.o.  1940    Allergies: Tramadol    Admit date: 1/6/2022    Discharge date and time: 1/7/2022    Admitting Physician: Sofia Cr MD     Discharge Physician: Sofia Cr MD      * Admission Diagnoses: Primary osteoarthritis of right shoulder [M19.011]; Osteoarthritis of right glenohumeral joint [M19.011]; Osteoarthritis of glenohumeral joint, right [M19.011]    * Discharge Diagnoses:   Hospital Problems as of 1/7/2022 Date Reviewed: 12/7/2021          Codes Class Noted - Resolved POA    Postoperative anemia due to acute blood loss ICD-10-CM: D62  ICD-9-CM: 285.1  1/6/2022 - Present Yes        Osteoarthritis of glenohumeral joint, right ICD-10-CM: M19.011  ICD-9-CM: 715.91  1/6/2022 - Present Unknown        * (Principal) Osteoarthritis of right glenohumeral joint ICD-10-CM: M19.011  ICD-9-CM: 715.91  1/4/2022 - Present Yes              Surgeon: Sofia Cr MD      Preoperative Medical Clearance: yes    * Procedure: Procedure(s):  REVERSE RIGHT TOTAL SHOULDER ARTHROPLASTY WITH DELTA EXTEND PROSTHESIS, BICEPS TENODESIS           Perioperative Antibiotics: Ancef  _x__                                                Vancomycin  ___          Post Op complications: none        * Discharge Condition: good          * Discharged to: Home    * Follow-up Care/Discharge instructions:  - Resume pre hospital diet            - Resume home medications per medical continuation form     CONTINUE PHYSICAL THERAPY  Sling right shoulder  - Follow up in office as scheduled       Signed:  Sofia Cr MD  1/7/2022  6:28 AM

## 2022-01-06 NOTE — BRIEF OP NOTE
BRIEF OPERATIVE NOTE    Date of Procedure: 1/6/2022     Preoperative Diagnosis:  GLENOHUMERAL OSTEOARTHRITIS RIGHT SHOULDER     Postoperative Diagnosis:  SAME    Procedure(s): REVERSE RIGHT TOTAL SHOULDER ARTHROPLASTY WITH DELTA EXTEND PROSTHESIS, BICEPS TENODESIS    Surgeon(s) and Role:     * Filiberto Berkowitz MD - Primary         Assistant Staff:  NONE      Surgical Staff:  Circ-1: Sonia Arnold RN  Scrub Tech-1: Poonam Boggs  Scrub Tech-2: Isa Amin  Scrub Tech-3: Selena Alvarez  Event Time In   Incision Start 4820   Incision Close        Anesthesia:  GENERAL WITH INTERSCALENE BLOCK    Estimated Blood Loss: 75 CC. Complications: NONE    Implants:   Implant Name Type Inv.  Item Serial No.  Lot No. LRB No. Used Action   CEMENT BNE FL 20ML MONMR 40GM -- SIMPLEX P - [id number]  CEMENT BNE FL 20ML MONMR 40GM -- SIMPLEX P  [employer] [facility]_ [id number] Right 1 Implanted   COMPONENT WILLIAM FIX [id number] SHLDR METAGLENE LNG PEG GLOB - [id number]  COMPONENT WILLIAM FIX [id number] SHLDR METAGLENE LNG PEG GLOB  [facility] DEPUY [employer]_ [id number] Right 1 Implanted   COMPONENT GLENOSPHERE ECC XTEND 38MM + 6MM - [id number]  COMPONENT GLENOSPHERE ECC XTEND 38MM + 6MM  [employer]_ [id number] Right 1 Implanted   SCR BNE CRTX ST 4.5X34MM SS --  - [id number]  SCR BNE CRTX ST 4.5X34MM SS --   [employer] [place] [id number] Right 1 Implanted   SCR BNE CRTX ST 4.5X36MM SS --  - [id number]  SCR BNE CRTX ST 4.5X36MM SS --   [employer] [place] [id number] Right 1 Implanted   SCR BNE CRTX ST 4.5X26MM SS --  - [id number]  SCR BNE CRTX ST 4.5X26MM SS --   [employer] [place] [id number] Right 1 Implanted   RESTRICTOR ANTHONY [id number] UNIV FEM CNL UHMWPE BIOSTP G - [id number]  RESTRICTOR ANTHONY [id number] UNIV FEM CNL UHMWPE BIOSTP G  [facility] [employer]_ [id number] Right 1 Implanted   STEM HUM SZ 2 L137MM [id number] 155DEG STD SHLDR CO CHROME HA - [id number]  STEM HUM SZ 2 L137MM [id number] 155DEG STD SHLDR CO CHROME HA  [facility] [id number] Right 1 Implanted   SPACER HUM +9MM OFFSET SHLDR POLYETH FOR DELT XTEND REV SYS - [id number]  SPACER HUM +9MM OFFSET SHLDR POLYETH FOR DELT XTEND REV SYS  [facility] [employer]_ [id number] Right 1 Implanted   CUP HUM [id number] +3MM OFFSET STD SHLDR Rafael Alcala [id number]  CUP HUM [id number] +3MM OFFSET STD [facility] [id number] Right 1 Implanted       Aubrey Lowe MD

## 2022-01-06 NOTE — CONSULTS
Admit date: 2022   Name:  Ira Jara   Age:  80 y.o.   :  1940   MRN:  [id number]   PCP:  Kelly Contreras MD   Provider:  Carla Tan MD      ASSESSMENT AND PLAN  80-year-old male with a past medical history hypertension, atrial fibrillation on Eliquis, coronary artery disease, diabetes, hyperlipidemia, osteoarthritis, that was admitted to the orthopedic service in the setting of end-stage glenohumeral osteoarthritis of the right shoulder status post total arthroplasty. We were consulted for medical management    1. Right end-stage glenohumeral osteoarthritis  -Status post total arthroplasty  -Management per primary team orthopedic surgery    2. Uncontrolled hypertension  -Continue losartan and metoprolol  -Start amlodipine    3. Hyperlipidemia  -Patient complaining of lower extremity pain and weakness due to pravastatin  -Discontinue pravastatin and start Crestor. Monitor renal function while on Crestor    4. Atrial fibrillation  -Currently rate controlled  -Continue metoprolol  -Continue Eliquis    5. Coronary artery disease  -Continue aspirin and Eliquis  -Discontinue pravastatin in the setting of side effects and to start Crestor    6. Diabetes  -Currently not on medication  -Monitor blood sugars    DVT prophylaxis per primary team    CONSULT REASON:  Medical management      HISTORY OF PRESENT ILLNESS:  80-year-old male with a past medical history hypertension, atrial fibrillation on Eliquis, coronary artery disease, diabetes, hyperlipidemia, osteoarthritis, that was admitted to the orthopedic service in the setting of end-stage glenohumeral osteoarthritis of the right shoulder status post total arthroplasty. We were consulted for medical management. Currently the patient denies any pain on his right shoulder. States that currently he feels fine. Denies any chest pain, no shortness of breath, no nausea vomiting or diarrhea.    The patient states that he has been getting some lower extremity pain and weakness after increasing dose of pravastatin. Past Medical History:   Diagnosis Date    Arrhythmia     AFlutter/SVT    Arthritis     CAD (coronary artery disease) 2010    2 xstents, Followed by Dr. Jaylin Borjas Chronic kidney disease     Stage III    Coagulation disorder ([facility] [address]     eliquis/plavix    Coronary atherosclerosis of native coronary artery 5/27/2010    2010:  PCI LAD Xience x 2    Diabetes ([address] type 2    Controlled with diet     Dyslipidemia 5/27/2010    Essential hypertension, benign      History of coronary artery stent placement 2010    LAC, LAD stented    History of kidney stones     History of prior ablation treatment 2014    due to atrial fib    Kidney disease     Was seen by [place] [facility] after sepsis for acute Kidney disease, has since been released    Kidney stone     Paroxysmal atrial fibrillation ([facility] [address] 10/2/2015    Platelet inhibition due to Plavix     Post PTCA     WITH STENT    Sepsis ([facility] [address]     Shingles outbreak     Syncope and collapse 07/25/2014       Past Surgical History:   Procedure Laterality Date    HX CATARACT REMOVAL Bilateral     HX CYST REMOVAL  years ago    scrotal    HX HEART CATHETERIZATION  2010    2 stents    ME CARDIAC SURG PROCEDURE UNLIST  2014    ablation          HOME MEDICATION:  Prior to Admission medications    Medication Sig Start Date End Date Taking? Authorizing Provider   clotrimazole-betamethasone (LOTRISONE) topical cream Apply  to affected area two (2) times a day. 10/11/21  Yes Provider, Historical   ganciclovir 0.15% (Zirgan) 0.15 % gel Administer 1 Drop to left eye every evening. Yes Provider, Historical   aspirin delayed-release 81 mg tablet Take 1 Tab by mouth every twelve (12) hours every twelve (12) hours. DISCONTINUE ONCE RESTARTED ON ELIQUIS. Patient taking differently: Take 81 mg by mouth daily.  DISCONTINUE ONCE RESTARTED ON ELIQUIS. 8/12/20  Yes Tomas Roberts PA   acetaminophen (TylenoL) 325 mg tablet Take 500 mg by mouth every four (4) hours as needed for Pain. Yes Provider, Historical   losartan (COZAAR) 100 mg tablet Take 1 Tab by mouth daily. 3/22/19  Yes Mireille Walton MD   pravastatin (PRAVACHOL) 40 mg tablet Take 1 Tab by mouth nightly. 3/22/19  Yes Mireille Walton MD   metoprolol tartrate (LOPRESSOR) 25 mg tablet Take 0.5 Tabs by mouth two (2) times a day. Indications: VENTRICULAR RATE CONTROL IN ATRIAL FIBRILLATION  Patient taking differently: Take 12.5 mg by mouth daily. Indications: ventricular rate control in atrial fibrillation 1/27/17  Yes Tanner Cook MD   Eliquis 5 mg tablet TAKE 1 TABLET BY MOUTH  TWICE DAILY 11/1/21   Mireille Walton MD   nitroglycerin (NITROSTAT) 0.4 mg SL tablet 0.4 mg by SubLINGual route every five (5) minutes as needed for Chest Pain. up to 3 doses - call 911 if chest pain persists.   Patient not taking: Reported on 1/6/2022    Other, MD Kobe         REVIEW OF SYSTEMS:  14 ROS negative except from stated on HPI      Social History     Tobacco Use    Smoking status: Never Smoker    Smokeless tobacco: Never Used   Substance Use Topics    Alcohol use: No    Drug use: No         Family History   Problem Relation Age of Onset    Heart Disease Mother     Diabetes Father     Diabetes Brother     Diabetes Brother     Cancer Brother     Heart Disease Sister         mild heart attack     \    Allergies   Allergen Reactions    Tramadol Drowsiness         Vitals:    01/06/22 1310 01/06/22 1315 01/06/22 1320 01/06/22 1420   BP:    (!) 174/81   Pulse: 85 87 87 75   Resp:    16   Temp:    97.9 °F (36.6 °C)   SpO2: 96% 96%  95%   Weight:       Height:             PHYSICAL EXAM:  General: Alert, oriented, NAD  HEENT: NC/AT, EOM are intact  Neck: supple, no JVD  Cardiovascular: RRR, S1, S2, no murmurs  Respiratory: Lungs are clear, no wheezes or rales  Abdomen: Soft, NT, ND  Back: No CVA tenderness, no paraspinal tenderness  Extremities: LE without pedal edema, no erythema  Neuro: A&O, CN are intact, no focal deficits  Skin: no rash or ulcers  Psych: good mood and affect      I have personally reviewed patients laboratory data showing  Lab Results   Component Value Date    WBC 6.1 12/29/2021    HGB 15.7 12/29/2021    HCT 48.4 12/29/2021    MCV 95.8 12/29/2021     12/29/2021     No results found for: CKMB  Lab Results   Component Value Date     (H) 12/29/2021     12/29/2021    K 4.5 12/29/2021    CO2 30 12/29/2021     (H) 12/29/2021    BUN 20 12/29/2021       I have personally reviewed patients imaging showing  XR SHOULDER RT AP/LAT MIN 2 V   Final Result   Postoperative changes from right shoulder arthroplasty. 2 views   right shoulder show no evidence of hardware complication. Bones are in near   anatomic alignment.

## 2022-01-06 NOTE — ANESTHESIA POSTPROCEDURE EVALUATION
Procedure(s):  REVERSE RIGHT TOTAL SHOULDER ARTHROPLASTY WITH DELTA EXTEND PROSTHESIS, BICEPS TENODESIS. general    Anesthesia Post Evaluation      Multimodal analgesia: multimodal analgesia used between 6 hours prior to anesthesia start to PACU discharge  Patient location during evaluation: bedside  Patient participation: complete - patient participated  Level of consciousness: awake and alert  Pain management: adequate  Airway patency: patent  Anesthetic complications: no  Cardiovascular status: acceptable  Respiratory status: acceptable  Hydration status: acceptable  Post anesthesia nausea and vomiting:  controlled  Final Post Anesthesia Temperature Assessment:  Normothermia (36.0-37.5 degrees C)      INITIAL Post-op Vital signs:   Vitals Value Taken Time   /77 01/06/22 1305   Temp 36.3 °C (97.3 °F) 01/06/22 1043   Pulse 87 01/06/22 1320   Resp 16 01/06/22 1130   SpO2 95 % 01/06/22 1318   Vitals shown include unvalidated device data.

## 2022-01-06 NOTE — PROGRESS NOTES
Care Management Interventions  PCP Verified by CM: Yes  Mode of Transport at Discharge: Self  Transition of Care Consult (CM Consult): [address]: Yes  Physical Therapy Consult: Yes  Support Systems: Spouse/Significant Other  Confirm Follow Up Transport: Friends  The Plan for Transition of Care is Related to the Following Treatment Goals : return to prior function  The Patient and/or Patient Representative was Provided with a Choice of Provider and Agrees with the Discharge Plan?: Yes  Freedom of Choice List was Provided with Basic Dialogue that Supports the Patient's Individualized Plan of Care/Goals, Treatment Preferences and Shares the Quality Data Associated with the Providers?: Yes  Discharge Location  Discharge Placement: Home with home health    Order rec'd to arrange home health, Pt w/o preference towards provider. Referral sent to [facility] [place]. Pt plans to go home with wife on d/c. Will follow until d/c.

## 2022-01-06 NOTE — H&P
Date of Surgery Update:  Garcia Valencia was seen and examined. History and physical has been reviewed. The patient has been examined.  There have been no significant clinical changes since the completion of the originally dated History and Physical.    Signed By: Cornell Marquez MD     January 6, 2022 6:27 AM

## 2022-01-06 NOTE — PROGRESS NOTES
TRANSFER - IN REPORT:    Verbal report received from PACU(name) on Willi Barrier  being received from PACU(unit) for routine progression of care      Report consisted of patients Situation, Background, Assessment and   Recommendations(SBAR). Information from the following report(s) SBAR, Kardex, Procedure Summary, Intake/Output, MAR and Recent Results was reviewed with the receiving nurse. Opportunity for questions and clarification was provided. Assessment completed upon patients arrival to unit and care assumed.

## 2022-01-06 NOTE — ANESTHESIA PROCEDURE NOTES
Peripheral Block    Start time: 1/6/2022 8:28 AM  End time: 1/6/2022 8:31 AM  Performed by: Jackie Jackson MD  Authorized by: Jackie Jackson MD       Pre-procedure: Indications: at surgeon's request and post-op pain management    Preanesthetic Checklist: patient identified, risks and benefits discussed, site marked, timeout performed, anesthesia consent given and patient being monitored    Timeout Time: 08:28 EST          Block Type:   Block Type:   Interscalene  Laterality:  Right  Monitoring:  Standard ASA monitoring, responsive to questions, oxygen, continuous pulse ox, frequent vital sign checks and heart rate  Injection Technique:  Single shot  Procedures: ultrasound guided    Patient Position: supine  Prep: chlorhexidine    Location:  Interscalene  Needle Type:  Stimuplex  Needle Gauge:  20 G  Needle Localization:  Ultrasound guidance  Medication Injected:  Ropivacaine (PF) (NAROPIN)(0.5%) 5 mg/mL injection, 30 mL  dexamethasone (DECADRON) 4 mg/mL injection, 4 mg  Med Admin Time: 1/6/2022 8:31 AM    Assessment:  Number of attempts:  1  Injection Assessment:  Incremental injection every 5 mL, negative aspiration for CSF, no paresthesia, ultrasound image on chart, local visualized surrounding nerve on ultrasound, negative aspiration for blood and no intravascular symptoms  Patient tolerance:  Patient tolerated the procedure well with no immediate complications

## 2022-01-07 VITALS
DIASTOLIC BLOOD PRESSURE: 68 MMHG | HEIGHT: 65 IN | WEIGHT: 204 LBS | HEART RATE: 61 BPM | BODY MASS INDEX: 33.99 KG/M2 | OXYGEN SATURATION: 96 % | SYSTOLIC BLOOD PRESSURE: 145 MMHG | RESPIRATION RATE: 16 BRPM | TEMPERATURE: 98 F

## 2022-01-07 LAB
ANION GAP SERPL CALC-SCNC: 5 MMOL/L (ref 7–16)
BUN SERPL-MCNC: 28 MG/DL (ref 8–23)
CALCIUM SERPL-MCNC: 8.3 MG/DL (ref 8.3–10.4)
CHLORIDE SERPL-SCNC: 105 MMOL/L (ref 98–107)
CO2 SERPL-SCNC: 26 MMOL/L (ref 21–32)
CREAT SERPL-MCNC: 1.6 MG/DL (ref 0.8–1.5)
ERYTHROCYTE [DISTWIDTH] IN BLOOD BY AUTOMATED COUNT: 12.6 % (ref 11.9–14.6)
GLUCOSE SERPL-MCNC: 299 MG/DL (ref 65–100)
HCT VFR BLD AUTO: 42 % (ref 41.1–50.3)
HGB BLD-MCNC: 13.8 G/DL (ref 13.6–17.2)
MAGNESIUM SERPL-MCNC: 2 MG/DL (ref 1.8–2.4)
MCH RBC QN AUTO: 30.9 PG (ref 26.1–32.9)
MCHC RBC AUTO-ENTMCNC: 32.9 G/DL (ref 31.4–35)
MCV RBC AUTO: 94.2 FL (ref 79.6–97.8)
NRBC # BLD: 0 K/UL (ref 0–0.2)
PLATELET # BLD AUTO: 165 K/UL (ref 150–450)
PMV BLD AUTO: 11.2 FL (ref 9.4–12.3)
POTASSIUM SERPL-SCNC: 5.1 MMOL/L (ref 3.5–5.1)
RBC # BLD AUTO: 4.46 M/UL (ref 4.23–5.6)
SODIUM SERPL-SCNC: 136 MMOL/L (ref 136–145)
WBC # BLD AUTO: 11.5 K/UL (ref 4.3–11.1)

## 2022-01-07 PROCEDURE — 36415 COLL VENOUS BLD VENIPUNCTURE: CPT

## 2022-01-07 PROCEDURE — 74011250637 HC RX REV CODE- 250/637: Performed by: ORTHOPAEDIC SURGERY

## 2022-01-07 PROCEDURE — 83735 ASSAY OF MAGNESIUM: CPT

## 2022-01-07 PROCEDURE — 74011250637 HC RX REV CODE- 250/637: Performed by: INTERNAL MEDICINE

## 2022-01-07 PROCEDURE — 77010033711 HC HIGH FLOW OXYGEN

## 2022-01-07 PROCEDURE — 74011250636 HC RX REV CODE- 250/636: Performed by: ORTHOPAEDIC SURGERY

## 2022-01-07 PROCEDURE — 94762 N-INVAS EAR/PLS OXIMTRY CONT: CPT

## 2022-01-07 PROCEDURE — 97530 THERAPEUTIC ACTIVITIES: CPT

## 2022-01-07 PROCEDURE — 74011000258 HC RX REV CODE- 258: Performed by: ORTHOPAEDIC SURGERY

## 2022-01-07 PROCEDURE — 85027 COMPLETE CBC AUTOMATED: CPT

## 2022-01-07 PROCEDURE — 97161 PT EVAL LOW COMPLEX 20 MIN: CPT

## 2022-01-07 PROCEDURE — G0378 HOSPITAL OBSERVATION PER HR: HCPCS

## 2022-01-07 PROCEDURE — 80048 BASIC METABOLIC PNL TOTAL CA: CPT

## 2022-01-07 RX ORDER — DEXTROSE 40 %
15 GEL (GRAM) ORAL AS NEEDED
Status: DISCONTINUED | OUTPATIENT
Start: 2022-01-07 | End: 2022-01-07 | Stop reason: HOSPADM

## 2022-01-07 RX ORDER — HYDROMORPHONE HYDROCHLORIDE 2 MG/1
2 TABLET ORAL
Qty: 40 TABLET | Refills: 0 | Status: SHIPPED | OUTPATIENT
Start: 2022-01-07 | End: 2022-01-14

## 2022-01-07 RX ORDER — PROMETHAZINE HYDROCHLORIDE 25 MG/1
25 TABLET ORAL
Qty: 20 TABLET | Refills: 0 | Status: SHIPPED | OUTPATIENT
Start: 2022-01-07 | End: 2022-01-12

## 2022-01-07 RX ORDER — DEXTROSE 50 % IN WATER (D50W) INTRAVENOUS SYRINGE
25-50 AS NEEDED
Status: DISCONTINUED | OUTPATIENT
Start: 2022-01-07 | End: 2022-01-07 | Stop reason: HOSPADM

## 2022-01-07 RX ORDER — INSULIN LISPRO 100 [IU]/ML
INJECTION, SOLUTION INTRAVENOUS; SUBCUTANEOUS
Status: DISCONTINUED | OUTPATIENT
Start: 2022-01-07 | End: 2022-01-07 | Stop reason: HOSPADM

## 2022-01-07 RX ADMIN — Medication 81 MG: at 08:28

## 2022-01-07 RX ADMIN — CEFAZOLIN SODIUM 1 G: 1 INJECTION, POWDER, FOR SOLUTION INTRAMUSCULAR; INTRAVENOUS at 08:28

## 2022-01-07 RX ADMIN — FERROUS SULFATE TAB 325 MG (65 MG ELEMENTAL FE) 325 MG: 325 (65 FE) TAB at 08:28

## 2022-01-07 RX ADMIN — CEFAZOLIN SODIUM 1 G: 1 INJECTION, POWDER, FOR SOLUTION INTRAMUSCULAR; INTRAVENOUS at 00:16

## 2022-01-07 RX ADMIN — APIXABAN 5 MG: 5 TABLET, FILM COATED ORAL at 08:28

## 2022-01-07 RX ADMIN — METOPROLOL TARTRATE 12.5 MG: 25 TABLET, FILM COATED ORAL at 08:28

## 2022-01-07 RX ADMIN — HYDROMORPHONE HYDROCHLORIDE 4 MG: 2 TABLET ORAL at 08:27

## 2022-01-07 RX ADMIN — LOSARTAN POTASSIUM 100 MG: 50 TABLET, FILM COATED ORAL at 08:28

## 2022-01-07 RX ADMIN — AMLODIPINE BESYLATE 2.5 MG: 2.5 TABLET ORAL at 08:28

## 2022-01-07 RX ADMIN — DOCUSATE SODIUM 100 MG: 100 CAPSULE ORAL at 08:28

## 2022-01-07 NOTE — DIABETES MGMT
Patient s/p reverse right total shoulder arthroplasty see provider notes for details. Admitting blood glucose 124. HbA1c 6.7 (). Lab blood glucose this morning was 299. Noted patient received Decadron 9mg yesterday. Reviewed patient current regimen: Humalog correctional insulin sensitive scale ACHS. Patient to discharge today. Spoke with patient remotely from within hospital system due to location. Patient alert and correctly identified patient identifiers. Patient is an 80year old male with a past medical history of PCI, HTN, dyslipidemia, cherise-parkinson-white syndrome, CKD stage 4, Afib. Patient states they were told they had diabetes in 2010 but only took Metformin at one time and then came off of it because his glucose levels were controlled. Patient voices a positive family history of diabetes stating his father was on insulin. Patient states they do not have a working glucometer with supplies at home stating when he picked up his glucometer test strips they didn't match his meter. Patient is aware with how to check his FSBS levels and politely declines glucometer instruction. Patient will need prescription for glucometer kit, test strips, and lancets at discharge. Provider updated via [employer]. Patient states they are currently taking no diabetes medications at home for management of diabetes. Reviewed relationship between infection and hyperglycemia. Discussed target blood glucose goals. Discussed target blood glucose goals in the older adult per ADA recommendations. Discussed importance of good glycemic control on wound healing. Encouraged patient to check glucose at varying times of the day ex: fasting and 2 hours prandial a couple times a week, to record in log, and take to PCP appointment. Encouraged patient to call PCP if blood glucose levels remain out of target goals. Patient verbalized understanding. Patient received steroid therapy.  Explained relationship between steroids and hyperglycemia to patient and educated patient that as steroids are tapered their glycemic control will likely improve. Encouraged patient to continue to work on lifestyle modifications and to follow up with primary care provider for further titration of regimen. Patient verbalized understanding and voices no further questions regarding diabetes management at this time.

## 2022-01-07 NOTE — PROGRESS NOTES
Problem: Mobility Impaired (Adult and Pediatric)  Goal: *Acute Goals and Plan of Care (Insert Text)  Outcome: Progressing Towards Goal  Note: GOALS (1-4 days):  (1.)  Patient will move from supine to sit and sit to supine  in bed with SUPERVISION. (2.)  Patient will transfer from bed to chair and chair to bed with SUPERVISION using the least restrictive device. (3.)  Patient will ambulate with SUPERVISION for 450 feet with the least restrictive device. (4.)  Patient will be independent with shoulder HEP to increase range of motion per MD orders. ________________________________________________________________________________________________        PHYSICAL THERAPY: Initial Assessment and AM 1/7/2022  OBSERVATION: Hospital Day: 2  [place] / [place] / Product Type: Medicare /      NAME/AGE/GENDER: Jona Cooper is a 80 y.o. male   PRIMARY DIAGNOSIS: Primary osteoarthritis of right shoulder [M19.011]  Osteoarthritis of right glenohumeral joint [M19.011]  Osteoarthritis of glenohumeral joint, right [M19.011] Osteoarthritis of right glenohumeral joint Osteoarthritis of right glenohumeral joint  Procedure(s) (LRB):  REVERSE RIGHT TOTAL SHOULDER ARTHROPLASTY WITH DELTA EXTEND PROSTHESIS, BICEPS TENODESIS (Right)  1 Day Post-Op  ICD-10: Treatment Diagnosis:   · Pain in Right Shoulder (M25.511)  · Stiffness of Right Shoulder, Not elsewhere classified (M25.611)  · Difficulty in walking, Not elsewhere classified (R26.2)   Precaution/Allergies:  Tramadol      ASSESSMENT:     Mr. Primitivo Brody presents a little limited in functional independence following his right reverse TSA and will benefit from PT to increase his independence with gait and transfers as well as his right shoulder ROM. He is anticipated to discharge home today around lunch time. Home with his wife at discharge and HHPT.   Transferred supine to sit and ambulated in the halls with SBA/CGA with occasional loss of balance then returned to the recliner and perfomred therex. Given written HEP and educated on therex and restrictions. He is safe and able to discharge from the therapy standpoint. This section established at most recent assessment   PROBLEM LIST (Impairments causing functional limitations):  1. Decreased Chandler with Bed Mobility  2. Decreased Chandler with Transfers  3. Decreased Chandler with Ambulation   4. Decreased Chandler with shoulder HEP   INTERVENTIONS PLANNED: (Benefits and precautions of physical therapy have been discussed with the patient.)  1. Bed Mobility Training  2. Transfer Training  3. Gait Training  4. Therapeutic Exercises per MD orders  5. Modalities for Pain     TREATMENT PLAN: Frequency/Duration: twice daily for duration of hospital stay  Rehabilitation Potential For Stated Goals: Good     RECOMMENDED REHABILITATION/EQUIPMENT: (at time of discharge pending progress): Continue Skilled Therapy and Home Health: Physical Therapy. HISTORY:   History of Present Injury/Illness (Reason for Referral): Admitted for right reverse TSA  Past Medical History/Comorbidities:   Mr. Kostas Woo  has a past medical history of Arrhythmia, Arthritis, CAD (coronary artery disease) (2010), Chronic kidney disease, Coagulation disorder ([address] Coronary atherosclerosis of native coronary artery (5/27/2010), Diabetes ([address] (type 2), Dyslipidemia (5/27/2010), Essential hypertension, benign ( ), History of coronary artery stent placement (2010), History of kidney stones, History of prior ablation treatment (2014), Kidney disease, Kidney stone, Paroxysmal atrial fibrillation ([address] (10/2/2015), Platelet inhibition due to Plavix, Post PTCA, Sepsis ([address] Shingles outbreak, and Syncope and collapse (07/25/2014). He has no past medical history of Coagulation defects or Unspecified adverse effect of anesthesia.   Mr. Kostas Woo  has a past surgical history that includes hx cyst removal (years ago); hx heart catheterization (2010); pr cardiac surg procedure unlist (); and hx cataract removal (Bilateral). Social History/Living Environment:   Home Environment: Private residence  One/Two Story Residence: One story  Living Alone: No  Support Systems: Spouse/Significant Other  Patient Expects to be Discharged to[de-identified] House  Current DME Used/Available at Home: None  Prior Level of Function/Work/Activity:  Independent prior to admit   Number of Personal Factors/Comorbidities that affect the Plan of Care: 0: LOW COMPLEXITY   EXAMINATION:   Most Recent Physical Functioning:   Gross Assessment:  AROM: Within functional limits (limited R UE)  Strength: Within functional limits (limited R UE)  Sensation: Intact               Posture:     Balance:  Sitting: Intact  Standing: Pull to stand; With support Bed Mobility:  Rolling: Contact guard assistance  Supine to Sit: Contact guard assistance  Wheelchair Mobility:     Transfers:  Sit to Stand: Contact guard assistance  Stand to Sit: Stand-by assistance  Bed to Chair: Contact guard assistance  Gait:     Speed/Katelyn: Delayed  Step Length: Right shortened;Left shortened  Gait Abnormalities: Decreased step clearance; Path deviations  Distance (ft): 420 Feet (ft)  Assistive Device: Walker, rolling  Ambulation - Level of Assistance: Stand-by assistance;Contact guard assistance  Interventions: Verbal cues; Safety awareness training      Body Structures Involved:  1. Joints  2. Muscles Body Functions Affected:  1. Movement Related Activities and Participation Affected:  1. Mobility   Number of elements that affect the Plan of Care: 4+: HIGH COMPLEXITY   CLINICAL PRESENTATION:   Presentation: Stable and uncomplicated: LOW COMPLEXITY   CLINICAL DECISION [facility] [place] [address] [address] AM-PAC 6 Clicks   Basic Mobility Inpatient Short Form  How much difficulty does the patient currently have. .. Unable A Lot A Little None   1.   Turning over in bed (including adjusting bedclothes, sheets and blankets)? [] 1   [] 2   [x] 3   [] 4   2. Sitting down on and standing up from a chair with arms ( e.g., wheelchair, bedside commode, etc.)   [] 1   [] 2   [x] 3   [] 4   3. Moving from lying on back to sitting on the side of the bed? [] 1   [] 2   [x] 3   [] 4   How much help from another person does the patient currently need. .. Total A Lot A Little None   4. Moving to and from a bed to a chair (including a wheelchair)? [] 1   [] 2   [x] 3   [] 4   5. Need to walk in hospital room? [] 1   [] 2   [x] 3   [] 4   6. Climbing 3-5 steps with a railing? [] 1   [] 2   [x] 3   [] 4   © 2007, Trustees of [address] [place] [address], under license to [employer]. All rights reserved      Score:  Initial: 18 Most Recent: X (Date: -- )    Interpretation of Tool:  Represents activities that are increasingly more difficult (i.e. Bed mobility, Transfers, Gait). Medical Necessity:     · Patient is expected to demonstrate progress in   · strength, range of motion, and functional technique  ·  to   · increase independence with transfers/gait as well as HEP  · .  Reason for Services/Other Comments:  · Patient continues to require skilled intervention due to   · Limited functional independence  · . Use of outcome tool(s) and clinical judgement create a POC that gives a: Clear prediction of patient's progress: LOW COMPLEXITY            TREATMENT:   (In addition to Assessment/Re-Assessment sessions the following treatments were rendered)   Pre-treatment Symptoms/Complaints:  some minimal soreness  Pain: Initial:      Post Session:  2     Therapeutic Activity: (    40 minutes): Therapeutic activities including Bed transfers, Chair transfers, Automobile transfers, and right UE TSA exercises to improve mobility, strength, and balance. Required minimal Verbal cues; Safety awareness training to promote dynamic balance in standing.        ASSESSMENT   Date:  1/7 Date:   Date:     ACTIVITY/EXERCISE AM PM AM PM AM PM   Gripping 10 Wrist Flexion/Extension 10        Wrist Ulnar/Radial Deviation         Pronation/Supination 10        Elbow Flexion/Extension 10        Shoulder Flexion/Extension 10 PROM        Shoulder AB/ADduction 10 PROM        Shoulder IR/ER         Pulleys         Pendulums 10 cw AND ccw        Shrugs 10        Isometric:                 Flexion         Extension         ABduction         ADduction         Biceps/Triceps                  B = bilateral; AA = active assistive; A = active; P = passive  Education:  [x]  Home Exercises  [x]  Sling Application   [x]  Movement Precautions   []  Pulleys   [x]  Use of Ice   []  Other:   Treatment/Session Assessment:    · Response to Treatment:  tolerated therapy well with little pain. He wants to go home today  · Interdisciplinary Collaboration:   o Physical Therapist  o Registered Nurse  · After treatment position/precautions:   o Up in chair  o Bed/Chair-wheels locked  o Bed in low position  o Call light within reach  o RN notified   · Compliance with Program/Exercises: compliant all of the time. · Recommendations/Intent for next treatment session:  Treatment next visit will focus on increasing Mr. Minayas independence with bed mobility, transfers, gait training, strength/ROM exercises, modalities for pain, and patient education.  Anticipate DC today  Total Treatment Duration:  PT Patient Time In/Time Out  Time In: 0910  Time Out: [address], PT

## 2022-01-07 NOTE — PROGRESS NOTES
Orthopedic Joint Progress Note    2022  Admit Date: 2022  Admit Diagnosis: Primary osteoarthritis of right shoulder [M19.011]; Osteoarthritis of right glenohumeral joint [M19.011]; Osteoarthritis of glenohumeral joint, right [M19.011]    1 Day Post-Op    Subjective: doing well     Anshu Bonus     Review of Systems: Pertinent items are noted in HPI. Objective:     PT/OT:     PATIENT MOBILITY                           Vital Signs:    Blood pressure (!) 162/81, pulse 83, temperature 98.8 °F (37.1 °C), resp. rate 16, height 5' 5\" (1.651 m), weight 204 lb (92.5 kg), SpO2 98 %.   Temp (24hrs), Av.1 °F (36.7 °C), Min:97.3 °F (36.3 °C), Max:98.8 °F (37.1 °C)      Pain Control:   Pain Assessment  Pain Scale 1: Numeric (0 - 10)  Pain Intensity 1: 0  Pain Onset 1: postop  Pain Location 1: Shoulder  Pain Orientation 1: Anterior  Pain Description 1: Sore    Meds:  Current Facility-Administered Medications   Medication Dose Route Frequency    0.9% sodium chloride infusion  75 mL/hr IntraVENous CONTINUOUS    sodium chloride (NS) flush 5-40 mL  5-40 mL IntraVENous Q8H    sodium chloride (NS) flush 5-40 mL  5-40 mL IntraVENous PRN    bisacodyL (DULCOLAX) suppository 10 mg  10 mg Rectal DAILY PRN    sodium phosphate (FLEET'S) enema 1 Enema  1 Enema Rectal PRN    promethazine (PHENERGAN) tablet 25 mg  25 mg Oral Q4H PRN    docusate sodium (COLACE) capsule 100 mg  100 mg Oral BID    ferrous sulfate tablet 325 mg  1 Tablet Oral BID WITH MEALS    HYDROmorphone (DILAUDID) tablet 4 mg  4 mg Oral Q3H PRN    HYDROmorphone (DILAUDID) tablet 2 mg  2 mg Oral Q3H PRN    HYDROmorphone (DILAUDID) injection 1 mg  1 mg IntraVENous Q1H PRN    aspirin delayed-release tablet 81 mg  81 mg Oral Q12H    losartan (COZAAR) tablet 100 mg  100 mg Oral DAILY    metoprolol tartrate (LOPRESSOR) tablet 12.5 mg  12.5 mg Oral BID    nitroglycerin (NITROSTAT) tablet 0.4 mg  0.4 mg SubLINGual Q5MIN PRN    apixaban (ELIQUIS) tablet 5 mg  5 mg Oral BID    ceFAZolin (ANCEF) 1 g in 0.9% sodium chloride (MBP/ADV) 50 mL MBP  1 g IntraVENous Q8H    amLODIPine (NORVASC) tablet 2.5 mg  2.5 mg Oral DAILY    rosuvastatin (CRESTOR) tablet 5 mg  5 mg Oral QHS        LAB:    Lab Results   Component Value Date/Time    INR 1.1 12/29/2021 03:15 PM    INR 1.1 07/28/2020 11:57 AM    INR 1.0 10/10/2014 06:53 AM     Lab Results   Component Value Date/Time    HGB 13.8 01/07/2022 03:56 AM    HGB 15.7 12/29/2021 03:15 PM    HGB 13.2 (L) 08/13/2020 06:48 AM       Wound Hip Left (Active)   Number of days: 514       Incision 01/06/22 Shoulder Right (Active)   Dressing Status Clean;Dry; Intact 01/06/22 1946   Cleansed Cleansed with saline 01/06/22 1010   Dressing/Treatment ABD pad;Gauze dressing/dressing sponge;Roll gauze;Steri-strips 01/06/22 1946   Number of days: 1         Physical Exam:  No significant changes    Assessment:      Principal Problem:    Osteoarthritis of right glenohumeral joint (1/4/2022)    Active Problems:    Postoperative anemia due to acute blood loss (1/6/2022)      Osteoarthritis of glenohumeral joint, right (1/6/2022)         Plan:     Continue PT/OT/Rehab  Discharge home    Patient Expects to be Discharged to[de-identified] [place]

## 2022-01-07 NOTE — PROGRESS NOTES
01/06/22 2052   Oxygen Therapy   O2 Sat (%) 98 %   Pulse via Oximetry 86 beats per minute   O2 Device Heated; Hi flow nasal cannula   Skin Assessment Clean, dry, & intact   O2 Flow Rate (L/min) 30 l/min   O2 Temperature 87.8 °F (31 °C)   FIO2 (%) 40 %   Pt on continuous monitor for HS. Alarm limits set. Pt working on IS. Pt on HFNC for tonight.

## 2022-01-07 NOTE — PROGRESS NOTES
Discharge instruction given pt/family verbalized understanding and signed form. Patient taken to car in wheelchair by staff.

## 2022-01-07 NOTE — PROGRESS NOTES
Shift assessment complete. Pt a/ox4 and resting in bed. Dressing to right shoulder c/d/i with sling and swathe in place. Pt has feeling in shoulder and able to wiggle all fingers. Bilateral radial pulses present and palpable +2. IV site c/d/i, patent and capped. No complaints of pain at this time. Bed low and locked, side rails x3, gripper socks on, and call light in reach. Encouraged to call for help if needed and pt verbalized understanding.

## 2022-01-07 NOTE — PROGRESS NOTES
Progress Note    Patient: Taran Nelson MRN: [id number]  SSN: [id number]    YOB: 1940  Age: 80 y.o. Sex: male      Admit Date: 1/6/2022    LOS: 0 days     Assessment and Plan:   80-year-old male with a past medical history hypertension, atrial fibrillation on Eliquis, coronary artery disease, diabetes, hyperlipidemia, osteoarthritis, that was admitted to the orthopedic service in the setting of end-stage glenohumeral osteoarthritis of the right shoulder status post total arthroplasty. We were consulted for medical management     1. Right end-stage glenohumeral osteoarthritis  -Status post total arthroplasty  -Management per primary team orthopedic surgery     2. Uncontrolled hypertension  -Continue losartan, metoprolol, amlodipine     3. Hyperlipidemia  -Continue statin     4. Atrial fibrillation  -Currently rate controlled  -Continue metoprolol  -Continue Eliquis     5. Coronary artery disease  -Continue aspirin and Eliquis     6. Diabetes  -Currently not on medication  -Monitor blood sugars     DVT prophylaxis per primary team    Subjective:   80-year-old male with a past medical history hypertension, atrial fibrillation on Eliquis, coronary artery disease, diabetes, hyperlipidemia, osteoarthritis, that was admitted to the orthopedic service in the setting of end-stage glenohumeral osteoarthritis of the right shoulder status post total arthroplasty. We were consulted for medical management. Patient seen and examined at bedside. This morning feeling better. Denies any chest pain, no shortness of breath, no cough.,  No nausea vomiting abdominal pain. Objective:     Vitals:    01/07/22 0743 01/07/22 0833 01/07/22 0834 01/07/22 1139   BP: (!) 166/69   (!) 145/68   Pulse: 76   61   Resp: 16   16   Temp: 98.3 °F (36.8 °C)   98 °F (36.7 °C)   SpO2: 99% 98% 94% 96%   Weight:       Height:            Intake and Output:  Current Shift: No intake/output data recorded.   Last three shifts: 01/05 1901 - 01/07 0700  In: 1000 [I.V.:1000]  Out: 825 [Urine:750]    ROS  10 ROS negative except from stated on subjective    Physical Exam:   General: Alert, oriented, NAD  HEENT: NC/AT, EOM are intact  Neck: supple, no JVD  Cardiovascular: RRR, S1, S2, no murmurs  Respiratory: Lungs are clear, no wheezes or rales  Abdomen: Soft, NT, ND  Back: No CVA tenderness, no paraspinal tenderness  Extremities: LE without pedal edema, no erythema  Neuro: A&O, CN are intact, no focal deficits  Skin: no rash or ulcers  Psych: good mood and affect    Lab/Data Review:  I have personally reviewed patients laboratory data showing  Recent Results (from the past 24 hour(s))   METABOLIC PANEL, BASIC    Collection Time: 01/07/22  3:56 AM   Result Value Ref Range    Sodium 136 136 - 145 mmol/L    Potassium 5.1 3.5 - 5.1 mmol/L    Chloride 105 98 - 107 mmol/L    CO2 26 21 - 32 mmol/L    Anion gap 5 (L) 7 - 16 mmol/L    Glucose 299 (H) 65 - 100 mg/dL    BUN 28 (H) 8 - 23 MG/DL    Creatinine 1.60 (H) 0.8 - 1.5 MG/DL    GFR est AA 54 (L) >60 ml/min/1.73m2    GFR est non-AA 44 (L) >60 ml/min/1.73m2    Calcium 8.3 8.3 - 10.4 MG/DL   MAGNESIUM    Collection Time: 01/07/22  3:56 AM   Result Value Ref Range    Magnesium 2.0 1.8 - 2.4 mg/dL   CBC W/O DIFF    Collection Time: 01/07/22  3:56 AM   Result Value Ref Range    WBC 11.5 (H) 4.3 - 11.1 K/uL    RBC 4.46 4.23 - 5.6 M/uL    HGB 13.8 13.6 - 17.2 g/dL    HCT 42.0 41.1 - 50.3 %    MCV 94.2 79.6 - 97.8 FL    MCH 30.9 26.1 - 32.9 PG    MCHC 32.9 31.4 - 35.0 g/dL    RDW 12.6 11.9 - 14.6 %    PLATELET 864 040 - 316 K/uL    MPV 11.2 9.4 - 12.3 FL    ABSOLUTE NRBC 0.00 0.0 - 0.2 K/uL      All Micro Results     None           Image:  I have personally reviewed patients imaging showing  XR SHOULDER RT AP/LAT MIN 2 V   Final Result   Postoperative changes from right shoulder arthroplasty. 2 views   right shoulder show no evidence of hardware complication. Bones are in near   anatomic alignment. Hospital problems     Patient Active Problem List   Diagnosis Code    Atherosclerosis of native coronary artery of native heart with stable angina pectoris (HCC) I25.118    Essential hypertension, benign I10    Dyslipidemia E78.5    WPW (Regi-Parkinson-White syndrome) I45.6    Acute pyelonephritis N10    Sepsis ([facility] A41.9    Ureteral stone N20.1    ANNA (acute kidney injury) ([facility] [address] N17.9    Acute on chronic kidney disease, stage 4 N18.4    Obstructed, uropathy N13.9    Hypotension I95.9    Bacteremia due to Gram-negative bacteria R78.81    Chronic atrial fibrillation (HCC) I48.20    Bilateral carotid artery stenosis I65.23    Arthritis of left hip M16.12    Urinary retention R33.9    Osteoarthritis of right glenohumeral joint M19.011    Postoperative anemia due to acute blood loss D62    Osteoarthritis of glenohumeral joint, right M19.011          Signed By: Zulma Patel MD     January 7, 2022

## 2022-01-08 ENCOUNTER — HOME CARE VISIT (OUTPATIENT)
Dept: SCHEDULING | Facility: HOME HEALTH | Age: 82
End: 2022-01-08
Payer: MEDICARE

## 2022-01-08 PROCEDURE — 3331090002 HH PPS REVENUE DEBIT

## 2022-01-08 PROCEDURE — G0151 HHCP-SERV OF PT,EA 15 MIN: HCPCS

## 2022-01-08 PROCEDURE — 400013 HH SOC

## 2022-01-08 PROCEDURE — 3331090001 HH PPS REVENUE CREDIT

## 2022-01-08 PROCEDURE — 400018 HH-NO PAY CLAIM PROCEDURE

## 2022-01-09 VITALS
DIASTOLIC BLOOD PRESSURE: 76 MMHG | SYSTOLIC BLOOD PRESSURE: 130 MMHG | OXYGEN SATURATION: 98 % | RESPIRATION RATE: 17 BRPM | TEMPERATURE: 97.4 F | HEART RATE: 62 BPM

## 2022-01-09 LAB
ABO + RH BLD: NORMAL
BLD PROD TYP BPU: NORMAL
BLD PROD TYP BPU: NORMAL
BLOOD GROUP ANTIBODIES SERPL: NORMAL
BPU ID: NORMAL
BPU ID: NORMAL
CROSSMATCH RESULT,%XM: NORMAL
CROSSMATCH RESULT,%XM: NORMAL
SPECIMEN EXP DATE BLD: NORMAL
STATUS OF UNIT,%ST: NORMAL
STATUS OF UNIT,%ST: NORMAL
UNIT DIVISION, %UDIV: 0
UNIT DIVISION, %UDIV: 0

## 2022-01-09 PROCEDURE — 3331090001 HH PPS REVENUE CREDIT

## 2022-01-09 PROCEDURE — 3331090002 HH PPS REVENUE DEBIT

## 2022-01-10 PROCEDURE — 3331090002 HH PPS REVENUE DEBIT

## 2022-01-10 PROCEDURE — 3331090001 HH PPS REVENUE CREDIT

## 2022-01-10 NOTE — DISCHARGE SUMMARY
[facility] SUMMARY    Name:  Tex Arrieta  MR#:  [id number]  :  1940  ACCOUNT #:  [de-identified]  ADMIT DATE:  2022  DISCHARGE DATE:  2022    ADMISSION DIAGNOSES:  End-stage glenohumeral osteoarthritis, right shoulder as well as postoperative anemia. DISCHARGE DIAGNOSES:  End-stage glenohumeral osteoarthritis, right shoulder as well as postoperative anemia. Please see H and P, operative summaries, and consult for details. HOSPITAL COURSE:  The patient is an 49-year-old gentleman who was admitted on 2022, underwent an uncomplicated reverse right total shoulder arthroplasty with a Delta Xtend prosthesis, biceps tenodesis. Postoperatively, he was started back on his Eliquis and aspirin. On postoperative day #1, his hemoglobin was 13.8, potassium was 5.1, magnesium was 2.0 and his pain was controlled. He was discharged home on postoperative day #1. He will continue therapy on the outside and follow up in my office in 10 days.       Celia Munoz MD      [id number]  D:  01/10/2022 11:32  T:  01/10/2022 15:57  JOB #:  [id number]  CC:  Christine Padron MD

## 2022-01-11 ENCOUNTER — HOME CARE VISIT (OUTPATIENT)
Dept: SCHEDULING | Facility: HOME HEALTH | Age: 82
End: 2022-01-11
Payer: MEDICARE

## 2022-01-11 VITALS
HEART RATE: 72 BPM | SYSTOLIC BLOOD PRESSURE: 138 MMHG | TEMPERATURE: 97.7 F | RESPIRATION RATE: 16 BRPM | OXYGEN SATURATION: 97 % | DIASTOLIC BLOOD PRESSURE: 78 MMHG

## 2022-01-11 PROCEDURE — 3331090001 HH PPS REVENUE CREDIT

## 2022-01-11 PROCEDURE — 3331090002 HH PPS REVENUE DEBIT

## 2022-01-11 PROCEDURE — G0157 HHC PT ASSISTANT EA 15: HCPCS

## 2022-01-11 NOTE — CASE COMMUNICATION
Please update the medication profile withthe following meddciation. Aspirin 81mg Patient takes 1 tablet by mouth preescribed by DR Brennon Hodge. Pateint has taken for years.

## 2022-01-12 PROCEDURE — 3331090001 HH PPS REVENUE CREDIT

## 2022-01-12 PROCEDURE — 3331090002 HH PPS REVENUE DEBIT

## 2022-01-13 ENCOUNTER — HOME CARE VISIT (OUTPATIENT)
Dept: HOME HEALTH SERVICES | Facility: HOME HEALTH | Age: 82
End: 2022-01-13
Payer: MEDICARE

## 2022-01-13 PROCEDURE — 3331090002 HH PPS REVENUE DEBIT

## 2022-01-13 PROCEDURE — 3331090001 HH PPS REVENUE CREDIT

## 2022-01-14 ENCOUNTER — HOME CARE VISIT (OUTPATIENT)
Dept: SCHEDULING | Facility: HOME HEALTH | Age: 82
End: 2022-01-14
Payer: MEDICARE

## 2022-01-14 VITALS
SYSTOLIC BLOOD PRESSURE: 138 MMHG | TEMPERATURE: 97 F | OXYGEN SATURATION: 97 % | HEART RATE: 80 BPM | DIASTOLIC BLOOD PRESSURE: 70 MMHG | RESPIRATION RATE: 15 BRPM

## 2022-01-14 PROCEDURE — G0157 HHC PT ASSISTANT EA 15: HCPCS

## 2022-01-14 PROCEDURE — 3331090001 HH PPS REVENUE CREDIT

## 2022-01-14 PROCEDURE — 3331090002 HH PPS REVENUE DEBIT

## 2022-01-15 PROCEDURE — 3331090002 HH PPS REVENUE DEBIT

## 2022-01-15 PROCEDURE — 3331090001 HH PPS REVENUE CREDIT

## 2022-01-16 ENCOUNTER — HOME CARE VISIT (OUTPATIENT)
Dept: HOME HEALTH SERVICES | Facility: HOME HEALTH | Age: 82
End: 2022-01-16
Payer: MEDICARE

## 2022-01-16 PROCEDURE — 3331090001 HH PPS REVENUE CREDIT

## 2022-01-16 PROCEDURE — 3331090002 HH PPS REVENUE DEBIT
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
## 2022-01-17 ENCOUNTER — HOME CARE VISIT (OUTPATIENT)
Dept: HOME HEALTH SERVICES | Facility: HOME HEALTH | Age: 82
End: 2022-01-17
Payer: MEDICARE

## 2022-01-17 PROCEDURE — 3331090002 HH PPS REVENUE DEBIT

## 2022-01-17 PROCEDURE — 3331090001 HH PPS REVENUE CREDIT
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
## 2022-01-18 ENCOUNTER — HOME CARE VISIT (OUTPATIENT)
Dept: HOME HEALTH SERVICES | Facility: HOME HEALTH | Age: 82
End: 2022-01-18
Payer: MEDICARE

## 2022-01-18 PROCEDURE — 3331090001 HH PPS REVENUE CREDIT

## 2022-01-18 PROCEDURE — 3331090002 HH PPS REVENUE DEBIT
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
## 2022-01-19 PROCEDURE — 3331090002 HH PPS REVENUE DEBIT

## 2022-01-19 PROCEDURE — 3331090001 HH PPS REVENUE CREDIT

## 2022-01-20 PROCEDURE — 3331090002 HH PPS REVENUE DEBIT

## 2022-01-20 PROCEDURE — 3331090001 HH PPS REVENUE CREDIT

## 2022-01-21 ENCOUNTER — HOME CARE VISIT (OUTPATIENT)
Dept: SCHEDULING | Facility: HOME HEALTH | Age: 82
End: 2022-01-21
Payer: MEDICARE

## 2022-01-21 VITALS
OXYGEN SATURATION: 98 % | RESPIRATION RATE: 12 BRPM | SYSTOLIC BLOOD PRESSURE: 138 MMHG | DIASTOLIC BLOOD PRESSURE: 70 MMHG | TEMPERATURE: 97.9 F | HEART RATE: 60 BPM

## 2022-01-21 PROCEDURE — 3331090002 HH PPS REVENUE DEBIT

## 2022-01-21 PROCEDURE — 3331090003 HH PPS REVENUE ADJ

## 2022-01-21 PROCEDURE — 3331090001 HH PPS REVENUE CREDIT

## 2022-01-21 PROCEDURE — G0151 HHCP-SERV OF PT,EA 15 MIN: HCPCS

## 2022-01-22 PROCEDURE — 3331090002 HH PPS REVENUE DEBIT

## 2022-01-22 PROCEDURE — 3331090001 HH PPS REVENUE CREDIT

## 2022-01-23 PROCEDURE — 3331090002 HH PPS REVENUE DEBIT

## 2022-01-23 PROCEDURE — 3331090001 HH PPS REVENUE CREDIT

## 2022-03-16 PROBLEM — E11.9 TYPE 2 DIABETES MELLITUS (HCC): Status: ACTIVE | Noted: 2022-03-16

## 2022-03-19 PROBLEM — M16.12 ARTHRITIS OF LEFT HIP: Status: ACTIVE | Noted: 2020-08-11

## 2022-03-19 PROBLEM — D62 POSTOPERATIVE ANEMIA DUE TO ACUTE BLOOD LOSS: Status: ACTIVE | Noted: 2022-01-06

## 2022-03-19 PROBLEM — M19.011 OSTEOARTHRITIS OF RIGHT GLENOHUMERAL JOINT: Status: ACTIVE | Noted: 2022-01-04

## 2022-03-19 PROBLEM — R33.9 URINARY RETENTION: Status: ACTIVE | Noted: 2020-08-19

## 2022-03-19 PROBLEM — I65.23 BILATERAL CAROTID ARTERY STENOSIS: Status: ACTIVE | Noted: 2017-07-28

## 2022-03-20 PROBLEM — M19.011 OSTEOARTHRITIS OF GLENOHUMERAL JOINT, RIGHT: Status: ACTIVE | Noted: 2022-01-06

## 2022-03-24 PROBLEM — E11.9 TYPE 2 DIABETES MELLITUS (HCC): Status: ACTIVE | Noted: 2022-03-16

## 2022-05-11 DIAGNOSIS — E11.9 TYPE 2 DIABETES MELLITUS WITHOUT COMPLICATION, WITHOUT LONG-TERM CURRENT USE OF INSULIN (HCC): ICD-10-CM

## 2022-05-11 DIAGNOSIS — I10 ESSENTIAL HYPERTENSION, BENIGN: ICD-10-CM

## 2022-05-11 DIAGNOSIS — I48.20 CHRONIC ATRIAL FIBRILLATION (HCC): ICD-10-CM

## 2022-05-11 DIAGNOSIS — I25.118 ATHEROSCLEROSIS OF NATIVE CORONARY ARTERY OF NATIVE HEART WITH STABLE ANGINA PECTORIS (HCC): Primary | ICD-10-CM

## 2022-07-13 ENCOUNTER — OFFICE VISIT (OUTPATIENT)
Dept: ORTHOPEDIC SURGERY | Age: 82
End: 2022-07-13
Payer: MEDICARE

## 2022-07-13 ENCOUNTER — TELEPHONE (OUTPATIENT)
Dept: CARDIOLOGY CLINIC | Age: 82
End: 2022-07-13

## 2022-07-13 DIAGNOSIS — M25.561 CHRONIC PAIN OF RIGHT KNEE: Primary | ICD-10-CM

## 2022-07-13 DIAGNOSIS — G89.29 CHRONIC PAIN OF RIGHT KNEE: Primary | ICD-10-CM

## 2022-07-13 DIAGNOSIS — M17.11 PRIMARY OSTEOARTHRITIS OF RIGHT KNEE: ICD-10-CM

## 2022-07-13 DIAGNOSIS — M54.42 ACUTE LOW BACK PAIN WITH BILATERAL SCIATICA, UNSPECIFIED BACK PAIN LATERALITY: ICD-10-CM

## 2022-07-13 DIAGNOSIS — M54.41 ACUTE LOW BACK PAIN WITH BILATERAL SCIATICA, UNSPECIFIED BACK PAIN LATERALITY: ICD-10-CM

## 2022-07-13 DIAGNOSIS — M25.562 ACUTE PAIN OF LEFT KNEE: ICD-10-CM

## 2022-07-13 DIAGNOSIS — M17.12 PRIMARY OSTEOARTHRITIS OF LEFT KNEE: ICD-10-CM

## 2022-07-13 DIAGNOSIS — M25.552 LEFT HIP PAIN: ICD-10-CM

## 2022-07-13 DIAGNOSIS — Z96.642 HISTORY OF TOTAL LEFT HIP REPLACEMENT: ICD-10-CM

## 2022-07-13 PROCEDURE — 4004F PT TOBACCO SCREEN RCVD TLK: CPT | Performed by: ORTHOPAEDIC SURGERY

## 2022-07-13 PROCEDURE — G8427 DOCREV CUR MEDS BY ELIG CLIN: HCPCS | Performed by: ORTHOPAEDIC SURGERY

## 2022-07-13 PROCEDURE — 1123F ACP DISCUSS/DSCN MKR DOCD: CPT | Performed by: ORTHOPAEDIC SURGERY

## 2022-07-13 PROCEDURE — 20611 DRAIN/INJ JOINT/BURSA W/US: CPT | Performed by: ORTHOPAEDIC SURGERY

## 2022-07-13 PROCEDURE — G8417 CALC BMI ABV UP PARAM F/U: HCPCS | Performed by: ORTHOPAEDIC SURGERY

## 2022-07-13 PROCEDURE — 99214 OFFICE O/P EST MOD 30 MIN: CPT | Performed by: ORTHOPAEDIC SURGERY

## 2022-07-13 RX ORDER — HYALURONATE SODIUM 10 MG/ML
20 SYRINGE (ML) INTRAARTICULAR ONCE
Status: COMPLETED | OUTPATIENT
Start: 2022-07-13 | End: 2022-07-13

## 2022-07-13 RX ADMIN — Medication 20 MG: at 14:22

## 2022-07-13 NOTE — PROGRESS NOTES
Name: Annabelle Stubbs  YOB: 1940  Gender: male  MRN: [id number]      CHIEF COMPLAINT: Hip Pain (Left hip ) and Knee Pain (Right knee )      HPI:   The pain has been present for many months and is becoming worse. It hurts at night when sleeping. There was not an acute injury to the hip or knee. The pain is located over the knees and left buttock. It hurts to walk and pain worsens with increased distance. The pain does not radiate down the leg. Numbness and tingling are not noted. The patient is not now having difficulty putting socks and shoes on. Treatment so far has been a left total hip replacement. Review of Systems  As per HPI. Pertinent positives and negatives are addressed with the patient, particularly those related to musculoskeletal concerns. Non-orthopaedic concerns were referred back to the primary care physician.       [address] [place], [place] [address]way:    Current Outpatient Medications:     acetaminophen (TYLENOL) 325 MG tablet, Take 500 mg by mouth every 4 hours as needed, Disp: , Rfl:     apixaban (ELIQUIS) 5 MG TABS tablet, Take 5 mg by mouth 2 times daily, Disp: , Rfl:     aspirin 81 MG EC tablet, Take 81 mg by mouth every 12 hours, Disp: , Rfl:     clotrimazole-betamethasone (LOTRISONE) 1-0.05 % cream, Apply topically 2 times daily, Disp: , Rfl:     docusate (COLACE, DULCOLAX) 100 MG CAPS, Take 100 mg by mouth 2 times daily, Disp: , Rfl:     ganciclovir (ZIRGAN) 0.15 % GEL ophthalmic gel, Apply 1 drop to eye every evening, Disp: , Rfl:     losartan (COZAAR) 100 MG tablet, Take 100 mg by mouth daily, Disp: , Rfl:     metoprolol tartrate (LOPRESSOR) 25 MG tablet, Take 12.5 mg by mouth 2 times daily, Disp: , Rfl:     nitroGLYCERIN (NITROSTAT) 0.4 MG SL tablet, Place 0.4 mg under the tongue, Disp: , Rfl:     polyethylene glycol (GLYCOLAX) 17 GM/SCOOP powder, Take 0.4 g/kg by mouth daily, Disp: , Rfl:     pravastatin (PRAVACHOL) 40 MG tablet, Take 40 mg by mouth, Disp: , Rfl: Allergies   Allergen Reactions    Tramadol Other (See Comments)     Past Medical History:   Diagnosis Date    Arrhythmia     AFlutter/SVT    Arthritis     CAD (coronary artery disease) 2010    2 xstents, Followed by Dr. Sparkle Clifford Chronic kidney disease     Stage III    Coagulation disorder ([address]     eliquis/plavix    Coronary atherosclerosis of native coronary artery 5/27/2010    2010:  PCI LAD Xience x 2    Diabetes ([address] type 2    Controlled with diet     Dyslipidemia 5/27/2010    Essential hypertension, benign      History of coronary artery stent placement 2010    LAC, LAD stented    History of kidney stones     History of prior ablation treatment 2014    due to atrial fib    Kidney disease     Was seen by [facility] after sepsis for acute Kidney disease, has since been released    Kidney stone     Paroxysmal atrial fibrillation ([address] 10/2/2015    Platelet inhibition due to Plavix     Post PTCA     WITH STENT    Sepsis ([address]     Shingles outbreak     Syncope and collapse 07/25/2014     . pmh  Past Surgical History:   Procedure Laterality Date    CARDIAC CATHETERIZATION  2010    2 stents    CATARACT REMOVAL Bilateral     CYST REMOVAL  years ago    scrotal    [place] CARDIAC SURG PROCEDURE UNLIST  2014    ablation     Family History   Problem Relation Age of Onset    Heart Disease Sister         mild heart attack    Cancer Brother     Diabetes Brother     Diabetes Brother     Diabetes Father     Heart Disease Mother      Social History     Socioeconomic History    Marital status:      Spouse name: Not on file    Number of children: Not on file    Years of education: Not on file    Highest education level: Not on file   Occupational History    Not on file   Tobacco Use    Smoking status: Never Smoker    Smokeless tobacco: Never Used   Substance and Sexual Activity    Alcohol use: No    Drug use: No    Sexual activity: Not on file   Other Topics Concern    Not on file   Social History Narrative    Not on file     Social Determinants of Health     Financial Resource Strain:     Difficulty of Paying Living Expenses: Not on file   Food Insecurity:     Worried About Running Out of Food in the Last Year: Not on file    [place] of Food in the Last Year: Not on file   Transportation Needs:     Lack of Transportation (Medical): Not on file    Lack of Transportation (Non-Medical): Not on file   Physical Activity:     Days of Exercise per Week: Not on file    Minutes of Exercise per Session: Not on file   Stress:     Feeling of Stress : Not on file   Social Connections:     Frequency of Communication with Friends and Family: Not on file    Frequency of Social Gatherings with Friends and Family: Not on file    Attends [employer] Services: Not on file    Active Member of [address] [place] [address] [employer] or Organizations: Not on file    Attends Club or Organization Meetings: Not on file    Marital Status: Not on file   Intimate Partner Violence:     Fear of Current or Ex-Partner: Not on file    Emotionally Abused: Not on file    Physically Abused: Not on file    Sexually Abused: Not on file   Housing Stability:     Unable to Pay for Housing in the Last Year: Not on file    Number of [place] in the Last Year: Not on file    Unstable Housing in the Last Year: Not on file       PHYSICAL EXAMINATION:   The patient appears their stated age and they are in no distress. The cervical, thoracic and lumbar spine are without compromising deformity. The upper extremities demonstrate reasonable tone, strength, and function bilaterally. The lower extremities are as described below. Circulation is normal with palpable pedal pulses bilaterally and no edema. Skin of the leg is intact without chronic stasis disease bilaterally.    Sensation is iintact to light tough bilaterally  Gait is noted to be with a slight trendelenburg and antalgia  Limb lengths are equal.  Straight leg test is negative bilaterally  Stability is normal bilaterally. Muscular strength is intact bilaterally. There is no lymphadenopathy in the groin or popliteal regions bilaterally. Knee: Range of motion is 0-120 degrees on the right and 0-120 degrees on the left. There is 2mm. of anterior/posterior translation and 2mm of medial/lateral instability bilaterally and there is 2 degrees of varus alignment in bilateral knees. left hip ROM is 0-90 degrees of flexion with 20 degrees on abduction and adduction. There is 15 degrees of internal rotation and 25 degrees of external rotation with no pain in groin  Limb lengths are equal.  Straight leg test is negative  Quadriceps strength is good. Their judgment and insight are normal.  They are oriented to time, place and person. Their memory is good and the mood and affect appropriate. XRAYS: AP pelvis and lateral views of the left hip are reviewed. There is good bone prosthetic interface with  no suggestion of a progressive lucency. X-ray impression:  Stable post-operative left total hip. AP and lateral views of lumbar spine reveal lumbar DJD. X-ray impression:  Lumbar spine DJD    Views of bilateral  knee (s) are reviewed. There is 4 views standing reveal some joint space loss, eburnated bone, and osteophyte formation present. X-ray impression:  Moderate bilateral  osteoarthritis of the knee    IMPRESSION:    Diagnosis Orders   1. Chronic pain of right knee  XR HIP 2-3 VW W PELVIS LEFT    XR Knee Bilateral Standard   2. Left hip pain  XR HIP 2-3 VW W PELVIS LEFT   3. Acute pain of left knee     4. Acute low back pain with bilateral sciatica, unspecified back pain laterality  XR LUMBAR SPINE (2-3 VIEWS)   5. History of total left hip replacement         RECOMMENDATION:     Reviewed x-ray findings with the patient. He was informed that his hip replacement looks fine.   He does have degenerative changes in his spine that I think are causing referred pain into the buttock area.  The patient is becoming more functionally limited by their symptoms. History, exam, and plain film findings seem to correlate more with spine disease as the primary source of their symptoms rather than an intrinsic hip or knee concern. Procedure Note: After alcohol prep, I injected 2mL of Euflexxa into the bilateral  knee. [employer] ultrasound unit with a variable frequency (6.0-15.0 MHz) linear transducer was used to visualize the retropatellar fat pad, patella, patellar tendon, tibia, and to ensure proper intra-articular placement of medication. The injection image was stored in the patient's permanent chart. The injection was without event and I will follow them as scheduled. Approximately 30 minutes was spent reviewing the medical record, imaging, performing history and physical examination, discussing the diagnosis and treatment plan with the patient, and completing documentation for this visit. He was offered a spine consultation wants to defer this. I will see him back next week for second injection bilateral knee Euflexxa.

## 2022-07-13 NOTE — TELEPHONE ENCOUNTER
Drug Samples Documentation:    Drug: Eliquis  Strength: 5mg  Quantity: 4 boxes  Expiration Date: 09.2024  Lot Number: [id number]    Patient walked in asking for samples

## 2022-07-20 ENCOUNTER — OFFICE VISIT (OUTPATIENT)
Dept: ORTHOPEDIC SURGERY | Age: 82
End: 2022-07-20
Payer: MEDICARE

## 2022-07-20 DIAGNOSIS — M17.11 LOCALIZED OSTEOARTHRITIS OF RIGHT KNEE: Primary | ICD-10-CM

## 2022-07-20 DIAGNOSIS — M17.12 LOCALIZED OSTEOARTHRITIS OF LEFT KNEE: ICD-10-CM

## 2022-07-20 PROCEDURE — 20611 DRAIN/INJ JOINT/BURSA W/US: CPT | Performed by: PHYSICIAN ASSISTANT

## 2022-07-20 RX ORDER — HYALURONATE SODIUM 10 MG/ML
20 SYRINGE (ML) INTRAARTICULAR ONCE
Status: COMPLETED | OUTPATIENT
Start: 2022-07-20 | End: 2022-07-20

## 2022-07-20 RX ADMIN — Medication 20 MG: at 09:29

## 2022-07-20 RX ADMIN — Medication 20 MG: at 09:30

## 2022-07-22 ENCOUNTER — OFFICE VISIT (OUTPATIENT)
Dept: ORTHOPEDIC SURGERY | Age: 82
End: 2022-07-22
Payer: MEDICARE

## 2022-07-22 DIAGNOSIS — M54.16 LUMBAR RADICULOPATHY: ICD-10-CM

## 2022-07-22 DIAGNOSIS — M51.36 DDD (DEGENERATIVE DISC DISEASE), LUMBAR: ICD-10-CM

## 2022-07-22 DIAGNOSIS — M43.16 SPONDYLOLISTHESIS OF LUMBAR REGION: Primary | ICD-10-CM

## 2022-07-22 PROCEDURE — 99203 OFFICE O/P NEW LOW 30 MIN: CPT | Performed by: PHYSICIAN ASSISTANT

## 2022-07-22 PROCEDURE — 1123F ACP DISCUSS/DSCN MKR DOCD: CPT | Performed by: PHYSICIAN ASSISTANT

## 2022-07-22 PROCEDURE — G8427 DOCREV CUR MEDS BY ELIG CLIN: HCPCS | Performed by: PHYSICIAN ASSISTANT

## 2022-07-22 PROCEDURE — G8417 CALC BMI ABV UP PARAM F/U: HCPCS | Performed by: PHYSICIAN ASSISTANT

## 2022-07-22 PROCEDURE — 1036F TOBACCO NON-USER: CPT | Performed by: PHYSICIAN ASSISTANT

## 2022-07-22 NOTE — PROGRESS NOTES
Name: Collette Haver  YOB: 1940  Gender: male  MRN: [id number]    CC: Back Pain (Left buttock pain into left leg)       HPI: This is a 80y.o. year old male who has greater than 8-month history of pain in the left buttock is radiated down the left posterior leg. He had a left total hip arthroplasty with Dr. Allison Agarwal. He thought he was having trouble with his hip. He presented and saw LUIS Ariza who checked his hip and said no abnormalities with his hip joint. It seems like a radicular pattern. He is referred to us for further evaluation. Patient reports pain is 9 out of 10 with walking. If he sits he does not have significant pain. He has past medical history of chronic kidney disease and atrial fibrillation. He is on Eliquis. His cardiologist is Dr. Nando Chau    History was obtained by patient and daughter    The patient denies any change in bowel or bladder function since the onset of the symptoms. he  has not had lumbar surgery in the past.      Thus far, the patient has tried no prior treatment       AMB PAIN ASSESSMENT 7/22/2022   Location of Pain Buttocks   Location Modifiers Left   Severity of Pain 9   Quality of Pain Dull   Duration of Pain Persistent   Frequency of Pain Intermittent   Date Pain First Started 1/11/2022   Aggravating Factors Walking;Standing   Limiting Behavior Some   Relieving Factors Rest   Result of Injury No   Work-Related Injury No   Are there other pain locations you wish to document? No            ROS/Meds/PSH/PMH/FH/SH: I personally reviewed the patient's collected intake data.   Below are the pertinents:    Allergies   Allergen Reactions    Tramadol Other (See Comments)         Current Outpatient Medications:     acetaminophen (TYLENOL) 325 MG tablet, Take 500 mg by mouth every 4 hours as needed, Disp: , Rfl:     apixaban (ELIQUIS) 5 MG TABS tablet, Take 5 mg by mouth 2 times daily, Disp: , Rfl:     aspirin 81 MG EC tablet, Take 81 mg by mouth every 12 hours, Disp: , Rfl:     clotrimazole-betamethasone (LOTRISONE) 1-0.05 % cream, Apply topically 2 times daily, Disp: , Rfl:     docusate (COLACE, DULCOLAX) 100 MG CAPS, Take 100 mg by mouth 2 times daily, Disp: , Rfl:     ganciclovir (ZIRGAN) 0.15 % GEL ophthalmic gel, Apply 1 drop to eye every evening, Disp: , Rfl:     losartan (COZAAR) 100 MG tablet, Take 100 mg by mouth daily, Disp: , Rfl:     metoprolol tartrate (LOPRESSOR) 25 MG tablet, Take 12.5 mg by mouth 2 times daily, Disp: , Rfl:     nitroGLYCERIN (NITROSTAT) 0.4 MG SL tablet, Place 0.4 mg under the tongue, Disp: , Rfl:     polyethylene glycol (GLYCOLAX) 17 GM/SCOOP powder, Take 0.4 g/kg by mouth daily, Disp: , Rfl:     pravastatin (PRAVACHOL) 40 MG tablet, Take 40 mg by mouth, Disp: , Rfl:     Past Surgical History:   Procedure Laterality Date    CARDIAC CATHETERIZATION  2010    2 stents    CATARACT REMOVAL Bilateral     CYST REMOVAL  years ago    scrotal    [place] CARDIAC SURG PROCEDURE UNLIST  2014    ablation       Patient Active Problem List   Diagnosis    Acute pyelonephritis    [place] (acute kidney injury) ([address]    Sepsis ([address]    Obstructed, uropathy    WPW (Raf-Parkinson-White syndrome)    Chronic atrial fibrillation (HCC)    Postoperative anemia due to acute blood loss    Ureteral stone    Dyslipidemia    Atherosclerosis of native coronary artery of native heart with stable angina pectoris (HCC)    Essential hypertension, benign    Urinary retention    Osteoarthritis of right glenohumeral joint    Bilateral carotid artery stenosis    Arthritis of left hip    Bacteremia due to Gram-negative bacteria    Hypotension    Osteoarthritis of glenohumeral joint, right    Type 2 diabetes mellitus ([address]         Tobacco:  reports that he has never smoked.  He has never used smokeless tobacco.  Alcohol:   Social History     Substance and Sexual Activity   Alcohol Use No        Physical Exam:   BMI: There is no height or weight on file to calculate BMI.    GENERAL:  Adult in no acute distress, well developed, well nourished Patient is appropriately conversant  MSK:  Examination of the lumbar spine reveals no sagittal or coronal imbalance   There is moderate tenderness to palpation along the left spinous processes and paraspinal musculature. The patient ambulates with a antalgic gait. ROM of bilateral hip(s) reveals no irritability. NEURO:  Cranial nerves grossly intact. No motor deficits. Straight leg testing is positive left  Sensory testing reveals intact sensation to light touch and in the distribution of the L3-S1 dermatomes bilaterally  Ankle jerk is negative for clonus    Reflexes   Right Left   Quadriceps (L4) 2 2   Achilles (S1) 2 2     Strength testing in the lower extremity reveals the following based on the 5 point grading scale:     HF (L2) H Ab (L5) KE (L3/4) ADF (L4) EHL (L5) A Ev (S1) APF (S1)   Right 5 5 5 5 5 5 5   Left 5 5 5 5 5 5 5     PSYCH:  Alert and oriented X 3. Appropriate affect. Intact judgment and insight. Radiographic Studies:     AP, lateral and spot views of the lumbar spine:  7/22/22  AP of the lumbar spine shows normal alignment. Hip Prosthesis is in place in the left hip. The sagittal view of the lumbar spine shows multilevel lumbar degenerative changes with spondylosis with anterior osteophytes. There is facet arthropathy which is abundant in the lumbar spine. There is a grade 1 anterior listhesis L5-S1. X-ray impression: As above      Assessment/Plan:         Diagnosis Orders   1. Spondylolisthesis of lumbar region        2. DDD (degenerative disc disease), lumbar        3. Lumbar radiculopathy              This patient's clinical history and physical exam is consistent with a left  S-1 lumbar radiculopathy. We discussed the natural history of lumbar radiculopathy in that many of these patients have near complete resolution of their symptoms within eight to twelve weeks with conservative care.  We discussed that conservative treatments typically start with activity modification, and medication followed by physical therapy as symptoms allow. Oral and/or epidural steroids are other options. I also discussed potential surgical options if the symptoms fail to improve or there is a progressive neurologic deficit and conservative management has been exhausted. We discussed that surgery is not typically a reliable treatment for isolated back pain, but is usually very reliable in relieving buttock and leg symptoms. We are limited in medication treatment because of his chronic kidney disease and atrial fibrillation being on Eliquis. We are hopeful that we can delineate anatomy and find particular nerve root compression to be able to order an injection. We will need cardiac clearance to hold his Eliquis in order to refer him for lumbar injection. I will see him back after the MRI scan. - A MRI was ordered to delineate anatomy, confirm the diagnosis and assess the severity. 4 This is a chronic illness/condition with exacerbation and progression    No orders of the defined types were placed in this encounter. No orders of the defined types were placed in this encounter. No follow-ups on file. Barrera Mayes PA-C  07/22/22      Elements of this note were created using speech recognition software. As such, errors of speech recognition may be present.

## 2022-07-22 NOTE — LETTER
Mariea Nina                                                     SAMY PAGE  1940  MRN [id number]                                              ROOM NUMBER______      Radiographic Studies:    Cervical MRI      Thoracic MRI         Lumbar MRI          Pelvis MRI        CONTRAST    CT Myelogram: _______________   NCS/EMG ________________ ( UE  /  LE )     MRI of ___________________          Other: ____________________      Injections:    KNEE    HIP  Depomedrol _____ mg Euflexxa _____    _______________ TFESI/SNRB  _______________ SI Joint  _______________ FLIP    _______________ Facet  _______________Piriformis/ Sciatica      Medications:    Oral Steroids _______________  NSAIDS _______________    Muscle Relaxers _______________  Neurontin/Lyrica _______________    Pain Medicine _______________  Other _______________                       Physical Therapy:    Lumbar     Thoracic      Cervical     Hip       Knee       Shoulder               Traction          Ultrasound          Dry Needling      Referral:    Pain referral:  [facility]   [facility]   Other: ______________________________    Follow-up/ Refer__________________________________________________    Authorization to hold blood thinners:___________________________________

## 2022-07-27 ENCOUNTER — OFFICE VISIT (OUTPATIENT)
Dept: ORTHOPEDIC SURGERY | Age: 82
End: 2022-07-27
Payer: MEDICARE

## 2022-07-27 DIAGNOSIS — M17.12 LOCALIZED OSTEOARTHRITIS OF LEFT KNEE: ICD-10-CM

## 2022-07-27 DIAGNOSIS — M17.11 LOCALIZED OSTEOARTHRITIS OF RIGHT KNEE: Primary | ICD-10-CM

## 2022-07-27 PROCEDURE — 20611 DRAIN/INJ JOINT/BURSA W/US: CPT | Performed by: ORTHOPAEDIC SURGERY

## 2022-07-27 RX ORDER — HYALURONATE SODIUM 10 MG/ML
20 SYRINGE (ML) INTRAARTICULAR ONCE
Status: COMPLETED | OUTPATIENT
Start: 2022-07-27 | End: 2022-07-27

## 2022-07-27 RX ADMIN — Medication 20 MG: at 09:28

## 2022-08-04 ENCOUNTER — OFFICE VISIT (OUTPATIENT)
Dept: ORTHOPEDIC SURGERY | Age: 82
End: 2022-08-04
Payer: MEDICARE

## 2022-08-04 DIAGNOSIS — M43.16 SPONDYLOLISTHESIS OF LUMBAR REGION: Primary | ICD-10-CM

## 2022-08-04 DIAGNOSIS — M54.16 LUMBAR RADICULOPATHY: ICD-10-CM

## 2022-08-04 DIAGNOSIS — M48.061 FORAMINAL STENOSIS OF LUMBAR REGION: ICD-10-CM

## 2022-08-04 PROCEDURE — 99214 OFFICE O/P EST MOD 30 MIN: CPT | Performed by: PHYSICIAN ASSISTANT

## 2022-08-04 PROCEDURE — G8427 DOCREV CUR MEDS BY ELIG CLIN: HCPCS | Performed by: PHYSICIAN ASSISTANT

## 2022-08-04 PROCEDURE — 1036F TOBACCO NON-USER: CPT | Performed by: PHYSICIAN ASSISTANT

## 2022-08-04 PROCEDURE — 1123F ACP DISCUSS/DSCN MKR DOCD: CPT | Performed by: PHYSICIAN ASSISTANT

## 2022-08-04 PROCEDURE — G8417 CALC BMI ABV UP PARAM F/U: HCPCS | Performed by: PHYSICIAN ASSISTANT

## 2022-08-04 NOTE — PATIENT INSTRUCTIONS
Epidural Steroid Injection / Selective Nerve Root Block  What is it? An epidural steroid injection (FLIP) is an injection of an anti-inflammatory medication directly on the sac that covers the spinal cord and nerves. A Selective Nerve Root Block (SNRB) is an injection that is directed around a specific nerve. Your physician usually orders an injection  when you have symptoms of an irritated spinal nerve. These symptoms can include back, buttock or leg pain, weakness, or numbness. Irritated spinal nerves are often caused by disc herniations or a tight spinal canal (stenosis). The goal of the steroid injection is to reduce the inflammation around the spinal cord and nerves, which should reduce the amount of back and leg pain you are experiencing. Epidural injections are often done in series. It would not be unusual to have two or three injections in a row 10 to 14 days apart. The reason for multiple injections is that the relief from the injection may wear off over time. And sometimes it takes multiple doses of steroid injection to obtain the most anti-inflammatory effect around the nerves. How is it performed? You will be asked to lie on your side or stomach on the x-ray table. This will allow the physician to position you in the best way possible to access your back. Next, the skin will be cleaned and numbed with a local anesthetic. An X-ray machine will then be used to guide a small gauge needle into the space over your spinal sac. A small amount of dye will then be injected to insure the needle is in the proper position. Finally, a mixture of numbing medicine and anti-inflammatory (steroid/cortisone) will be injected. How long does it take? All together it should take about 1 hour. The back and legs may feel weak or numb for a couple of hours after the injection. Plan the have someone drive you home. Are there any restrictions after the FLIP?    Try to spend the remainder of the evening resting as much as possible. You may return to your normal activities the day after the procedure, including returning to work. What are the risks? The most common risk is a spinal headache. This happens when the needle passes through the spinal sac and can result in leakage of spinal fluid. This is usually treated with lying in a flat position and high fluid intake. Rarely, spinal headaches lasting more than a few days can be treated with a small procedure called a blood patch. Another risk, though very small, is the injection of the medication into one of the tiny blood vessels in the spinal canal.  This can result in serious complications such as seizures, cardiac arrest and even death. Fortunately, these complications are quite rare. Other rare complications include infection, bleeding into the spinal canal (epidural hematoma), loss of bladder control, and injury to a nerve with the needle. Who should not have an FLIP? The injection of a steroid anywhere in the body can cause a significant increase in the blood sugar level. Diabetics are very sensitive to steroids and should have them administered with caution. Diabetic patients can have injections but need to watch their blood sugar after the injection and discuss with their Primary Care Physician a plan to manage elevations in blood sugar. Steroids also decrease the body's ability to fight infection. Thus, any person with an active infection should not take a steroid medication until the infection has been cleared completely. If you are taking an antibiotic for an active infection, please notify our office.    Additionally, some patients may have anatomic abnormalities or have had previous back surgeries which may not allow the needle to pass into the spinal canal.     Medications that result in thinning of the blood such as coumadin, aspirin, Plavix, Eliquis, Xarelto and many anti-inflammatory medications need to be discontinued 5-7 days prior to the injection. Check with your doctor regarding specific drugs you are taking. Med    [facility]    MD Missael Mcmahon MD Amy Loman Saliva, PA-C Thurnell Ming, NP    Main Office  [address], [facility] [place], [address]        For Appointments at either location, please call [phone] that result in thinning of blood such as Coumadin, Aspirin, Plavix, and many anti-inflammatories, need to Pre procedure teaching sheet: Epidural spinal injection, selective nerve root block injection, sacroiliac injection and facet block injections. You have been scheduled for spinal injection. This injection is to help decrease her back and or leg pain. A local anesthetic will be used to numb the area. Then, a spinal needle will be placed in the appropriate place according to the type of injection ordered by your physician. A steroid with or without local anesthetic will be injected. A small amount of contrast dye will be used to better visualize the injection site. You will be lying on your stomach. A series of 3 injections may be performed as these are ordered 2 to 3 weeks apart. Be aware, steroids can take 2 to 3 days to begin working, but can take 7 to 10 days for full effectiveness. Therefore, you may not have relief immediately. Please be patient and give the injection time to work. You should not resume any type of strenuous workout routine for at least 3 days following the procedure. Walking is fine. Prior to your procedure    - Please call your primary care physician if you are on blood thinners such as Coumadin, warfarin, heparin, Plavix, Lovenox, Effient, Eliquis, Xarelto, Brilinta, or aspirin or other blood thinner as these will need to be stopped for 3 to 7 days before the injections.   We will need verbal approval to stop the thinners and proceed with the injection from the physician treating you with the blood thinners prior to the appointment date. If your physician tells you it is not safe to stop the blood thinner, you must call our office and discuss this with us. We may need to cancel the procedure. - Please do not take any nonsteroidal anti-inflammatory medications (NSAIDs) such as Advil, ibuprofen, Celebrex, meloxicam or Aleve for 3 days before your injection.    - We cannot give you an injection if you are presently taking an antibiotic. - Please continue your other medications as prescribed. -You must bring someone to drive you home. - You may eat prior to your procedure, but we asked that you do not eat a heavy meal within 2 to 4 hours of your procedure. You may drink liquids up to 1 to 2 hours before your appointment time. - If you are diabetic, please adjust your medications as appropriate. If steroid is used be aware that they may increase your blood sugar. Closely monitor your blood sugars after the procedure. - If you are sick, running a fever, have a virus or are put on antibiotics during the week before your procedure, please call to reschedule. We cannot do injections if you are sick. Day of procedure    - Arrive 15 minutes before your procedure time, register at the . - A staff member will call you from the waiting area and bring you to the exam room. - You may or may not be asked to change into shorts. Please leave valuables at home. If you wear clothing with elastic waist, you will not be required to change. - The nurse will talk with you and have you sign a consent form before your injection. If you have any allergies to Betadine, iodine, shellfish or x-ray dye, please tell the nurse at this time. - You will be positioned on the table on your stomach. A special x-ray machine is used to loreto the correct position of the needle. We will clean the area with Betadine or Hibiclens.   Sterile towels were placed E  Daypro     Ketorolac   Percodan    Voltaren  Diflunisal Kaopectate   Lovenox   Piroxicam    Warfarin    Diclofenac Lodine       Phenaphen    Xarelto  Eliquis       Enoxaparin

## 2022-08-04 NOTE — LETTER
Leiud Crumbly                                                     SAMY PAGE  1940  MRN [id number]                                              ROOM NUMBER______      Radiographic Studies:    Cervical MRI      Thoracic MRI         Lumbar MRI          Pelvis MRI        CONTRAST    CT Myelogram: _______________   NCS/EMG ________________ ( UE  /  LE )     MRI of ___________________          Other: ____________________      Injections:    KNEE    HIP  Depomedrol _____ mg Euflexxa _____    _______________ TFESI/SNRB  _______________ SI Joint  _______________ FLIP    _______________ Facet  _______________Piriformis/ Sciatica      Medications:    Oral Steroids _______________  NSAIDS _______________    Muscle Relaxers _______________  Neurontin/Lyrica _______________    Pain Medicine _______________  Other _______________                       Physical Therapy:    Lumbar     Thoracic      Cervical     Hip       Knee       Shoulder               Traction          Ultrasound          Dry Needling      Referral:    Pain referral:  [facility]   [facility]   Other: ______________________________    Follow-up/ Refer__________________________________________________    Authorization to hold blood thinners:___________________________________

## 2022-08-04 NOTE — PROGRESS NOTES
Name: Kathe Polk  YOB: 1940  Gender: male  MRN: [id number]    CC: Back Pain (MRI results)       HPI: This is a 80y.o. year old male who has greater than 8-month history of pain in the left buttock is radiated down the left posterior leg. He had a left total hip arthroplasty with Dr. Carlo Lamb. He thought he was having trouble with his hip. He presented and saw LUIS Bobo who checked his hip and said no abnormalities with his hip joint. It seems like a radicular pattern. He is referred to us for further evaluation. Patient reports pain is 9 out of 10 with walking. If he sits he does not have significant pain. He has past medical history of chronic kidney disease and atrial fibrillation. He is on Eliquis. His cardiologist is Dr. Manish Betancourt    History was obtained by patient and daughter    The patient denies any change in bowel or bladder function since the onset of the symptoms. he  has not had lumbar surgery in the past.      Thus far, the patient has tried no prior treatment  We saw he had spondylolisthesis L5-S1 on x-ray. His symptoms were of a left L5 and S1 radiculopathy. I ordered an MRI scan of the lumbar spine to delineate anatomy and hopefully outline lumbar injections. He is on Eliquis we will need to get clearance to hold Eliquis. AMB PAIN ASSESSMENT 8/4/2022   Location of Pain -   Location Modifiers -   Severity of Pain 6   Quality of Pain -   Duration of Pain -   Frequency of Pain -   Date Pain First Started -   Aggravating Factors -   Limiting Behavior -   Relieving Factors -   Result of Injury -   Work-Related Injury -   Are there other pain locations you wish to document? -            ROS/Meds/PSH/PMH/FH/SH: I personally reviewed the patient's collected intake data.   Below are the pertinents:    Allergies   Allergen Reactions    Tramadol Other (See Comments)         Current Outpatient Medications:     acetaminophen (TYLENOL) 325 MG tablet, Take 500 mg by mouth every 4 hours as needed, Disp: , Rfl:     apixaban (ELIQUIS) 5 MG TABS tablet, Take 5 mg by mouth 2 times daily, Disp: , Rfl:     aspirin 81 MG EC tablet, Take 81 mg by mouth every 12 hours, Disp: , Rfl:     clotrimazole-betamethasone (LOTRISONE) 1-0.05 % cream, Apply topically 2 times daily, Disp: , Rfl:     docusate (COLACE, DULCOLAX) 100 MG CAPS, Take 100 mg by mouth 2 times daily, Disp: , Rfl:     ganciclovir (ZIRGAN) 0.15 % GEL ophthalmic gel, Apply 1 drop to eye every evening, Disp: , Rfl:     losartan (COZAAR) 100 MG tablet, Take 100 mg by mouth daily, Disp: , Rfl:     metoprolol tartrate (LOPRESSOR) 25 MG tablet, Take 12.5 mg by mouth 2 times daily, Disp: , Rfl:     nitroGLYCERIN (NITROSTAT) 0.4 MG SL tablet, Place 0.4 mg under the tongue, Disp: , Rfl:     polyethylene glycol (GLYCOLAX) 17 GM/SCOOP powder, Take 0.4 g/kg by mouth daily, Disp: , Rfl:     pravastatin (PRAVACHOL) 40 MG tablet, Take 40 mg by mouth, Disp: , Rfl:     Past Surgical History:   Procedure Laterality Date    CARDIAC CATHETERIZATION  2010    2 stents    CATARACT REMOVAL Bilateral     CYST REMOVAL  years ago    scrotal    [place] CARDIAC SURG PROCEDURE UNLIST  2014    ablation       Patient Active Problem List   Diagnosis    Acute pyelonephritis    [place] (acute kidney injury) ([address]    Sepsis ([address]    Obstructed, uropathy    WPW (Raf-Parkinson-White syndrome)    Chronic atrial fibrillation (HCC)    Postoperative anemia due to acute blood loss    Ureteral stone    Dyslipidemia    Atherosclerosis of native coronary artery of native heart with stable angina pectoris ([address]    Essential hypertension, benign    Urinary retention    Osteoarthritis of right glenohumeral joint    Bilateral carotid artery stenosis    Arthritis of left hip    Bacteremia due to Gram-negative bacteria    Hypotension    Osteoarthritis of glenohumeral joint, right    Type 2 diabetes mellitus ([address]         Tobacco:  reports that he has never smoked.  He has never used smokeless tobacco.  Alcohol:   Social History     Substance and Sexual Activity   Alcohol Use No        Physical Exam:   BMI: There is no height or weight on file to calculate BMI. GENERAL:  Adult in no acute distress, well developed, well nourished Patient is appropriately conversant  MSK:  Examination of the lumbar spine reveals no sagittal or coronal imbalance   There is moderate tenderness to palpation along the left spinous processes and paraspinal musculature. The patient ambulates with a antalgic gait. ROM of bilateral hip(s) reveals no irritability. NEURO:  Cranial nerves grossly intact. No motor deficits. Straight leg testing is positive left  Sensory testing reveals intact sensation to light touch and in the distribution of the L3-S1 dermatomes bilaterally  Ankle jerk is negative for clonus    Reflexes   Right Left   Quadriceps (L4) 2 2   Achilles (S1) 2 2     Strength testing in the lower extremity reveals the following based on the 5 point grading scale:     HF (L2) H Ab (L5) KE (L3/4) ADF (L4) EHL (L5) A Ev (S1) APF (S1)   Right 5 5 5 5 5 5 5   Left 5 5 5 5 5 5 5     PSYCH:  Alert and oriented X 3. Appropriate affect. Intact judgment and insight. Radiographic Studies:     AP, lateral and spot views of the lumbar spine:  7/22/22  AP of the lumbar spine shows normal alignment. Hip Prosthesis is in place in the left hip. The sagittal view of the lumbar spine shows multilevel lumbar degenerative changes with spondylosis with anterior osteophytes. There is facet arthropathy which is abundant in the lumbar spine. There is a grade 1 anterior listhesis L5-S1. X-ray impression: As above    MRI Result (most recent): MRI LUMBAR SPINE WO CONTRAST 07/22/2022    Narrative  History: Chronic low back pain. No surgery. Exam: MRI lumbar spine without contrast    Technique: Axial and sagittal T1 and T2-weighted sequences are available for  review.   A sagittal STIR sequence is also available for review. No comparison. Findings:    Degenerative disc signal present L1-L2 through L5-S1 with varying degrees of  disc height loss. The conus is normal in configuration and signal intensity  throughout. Axial imaging demonstrates bilateral renal cysts. No additional  abnormal retroperitoneal lesions. L1-2: There is a small disc bulge with bilateral facet arthropathy. No central  or neural foraminal narrowing. L2-3: There is facet arthropathy and a small disc bulge. There is no central or  neural foraminal stenosis. L3-L4: There is mild facet arthropathy. No central or neural foraminal  narrowing. L4-L5: There is a small disc bulge with bilateral facet arthropathy. No central  or neural foraminal stenosis. L5-S1: There is a small disc bulge with bilateral facet arthropathy. There is  moderate left neural foraminal narrowing. Impression  Impression: Multilevel lumbar spondylosis with moderate left neural foraminal  narrowing at L5-S1. I have independently reviewed the MRI of the lumbar spine. L5-S1 is a level of the grade 1 anterior listhesis that is noted on x-ray. It does reduce on the MRI scan. He has small disc bulge bilateral facet arthropathy moderate left foraminal narrowing. I suspect this is more prominent when he stands with the listhesis. Assessment/Plan:         Diagnosis Orders   1. Spondylolisthesis of lumbar region        2. Lumbar radiculopathy        3. Foraminal stenosis of lumbar region                This patient's clinical history and physical exam is consistent with a left L5 and S-1 lumbar radiculopathy. We discussed the natural history of lumbar radiculopathy in that many of these patients have near complete resolution of their symptoms within eight to twelve weeks with conservative care. We discussed that conservative treatments typically start with activity modification, and medication followed by physical therapy as symptoms allow.   Oral and/or

## 2022-08-04 NOTE — LETTER
Name:     Missael Wood  :      1940  Age:        80 y.o.   Gender:  male  Chart#:   [id number]        Date:  22    Requesting Physician: Tarsha Dyer PA-C    Date of Surgery or Procedure:   TBD     Procedure: Left L-5 and S-1 Selective Nerve Root Block    Type of Anesthesia:   Local    Cardiac Clearance Needed:  YES     THE MEDICATION TO BE HELD:   ELIQUIS       Days to Hold:   3-5 DAYS    Comments:    PATIENT TO RESUME AFTER PROCEDURE    Sent To:   DR. Karen Mckeon

## 2022-08-05 ENCOUNTER — TELEPHONE (OUTPATIENT)
Dept: CARDIOLOGY CLINIC | Age: 82
End: 2022-08-05

## 2022-08-05 NOTE — PROGRESS NOTES
A Left L5 and S1 SNRB was ordered and a cardiac clearance was sent to Dr. Sophia Hogan at [facility]. Will schedule injection after clearance is given.

## 2022-08-05 NOTE — TELEPHONE ENCOUNTER
Diagnosis          Type 2 diabetes mellitus       Postoperative anemia due to acute blood loss       Osteoarthritis of glenohumeral joint, right       Osteoarthritis of right glenohumeral joint       Urinary retention       Arthritis of left hip       Bilateral carotid artery stenosis             02/2017:  < 50% bilateral             Chronic atrial fibrillation ([address]       Bacteremia due to Gram-negative bacteria       Acute on chronic kidney disease, stage 4       Obstructed, uropathy       Hypotension       Acute pyelonephritis       Sepsis ([address]       Ureteral stone       [place] (acute kidney injury) ([address]       WPW (Raf-Parkinson-White syndrome)       Atherosclerosis of native coronary artery of native heart with stable angina pectoris ([address]             2010:  PCI LAD Xience x 2             Essential hypertension, benign          Dyslipidemia

## 2022-08-05 NOTE — TELEPHONE ENCOUNTER
Patient having Left L-5 and S - 1Selective Nerve root Block on TBD with Priti SMITH under Local. Need Eliquis hold for 3 - 5 days  Fax: [phone]

## 2022-08-19 ENCOUNTER — TELEPHONE (OUTPATIENT)
Dept: ORTHOPEDIC SURGERY | Age: 82
End: 2022-08-19

## 2022-08-19 NOTE — TELEPHONE ENCOUNTER
Pt called and wants to schedule injection. He asked if clearance was received to come off his blood thinner for 3 days before injection.

## 2022-08-19 NOTE — TELEPHONE ENCOUNTER
Spoke with patient and scheduled injection. I also informed him to hold his Eliquis starting Monday 8/22/22 and that he may resume after his injection on 8/24/22. He stated he is not ready to schedule the follow up yet. He will be scheduled with [facility] when he is ready for eval and treat.

## 2022-08-25 ENCOUNTER — OFFICE VISIT (OUTPATIENT)
Dept: ORTHOPEDIC SURGERY | Age: 82
End: 2022-08-25
Payer: MEDICARE

## 2022-08-25 DIAGNOSIS — M51.36 DDD (DEGENERATIVE DISC DISEASE), LUMBAR: ICD-10-CM

## 2022-08-25 DIAGNOSIS — M43.16 SPONDYLOLISTHESIS OF LUMBAR REGION: Primary | ICD-10-CM

## 2022-08-25 DIAGNOSIS — M54.16 LUMBAR RADICULOPATHY: ICD-10-CM

## 2022-08-25 DIAGNOSIS — M48.061 FORAMINAL STENOSIS OF LUMBAR REGION: ICD-10-CM

## 2022-08-25 PROCEDURE — 64484 NJX AA&/STRD TFRM EPI L/S EA: CPT | Performed by: PHYSICAL MEDICINE & REHABILITATION

## 2022-08-25 PROCEDURE — 64483 NJX AA&/STRD TFRM EPI L/S 1: CPT | Performed by: PHYSICAL MEDICINE & REHABILITATION

## 2022-08-25 RX ORDER — TRIAMCINOLONE ACETONIDE 40 MG/ML
120 INJECTION, SUSPENSION INTRA-ARTICULAR; INTRAMUSCULAR ONCE
Status: COMPLETED | OUTPATIENT
Start: 2022-08-25 | End: 2022-08-25

## 2022-08-25 RX ADMIN — TRIAMCINOLONE ACETONIDE 120 MG: 40 INJECTION, SUSPENSION INTRA-ARTICULAR; INTRAMUSCULAR at 09:49

## 2022-08-25 NOTE — PROGRESS NOTES
Name: Tram Herbert  YOB: 1940  Gender: male  MRN: [id number]        Transforaminal FLIP Procedure Note    Procedure: Left  L5-S1 and S1-S2 transforaminal epidural steroid injections     Precautions: Tram Herbert denies prior sensitivity to steroid, local anesthetic, iodine, or shellfish. Consent:  Consent was obtained prior to the procedure. The procedure was discussed at length with Tram Herbert. He was given the opportunity to ask questions regarding the procedure and its associated risks. In addition to the potential risks associated with the procedure itself, the patient was informed both verbally and in writing of potential side effects of the use glucocorticoids. The patient appeared to comprehend the informed consent and desired to have the procedure performed, and informed consent was signed. He was placed in a prone position on the fluoroscopy table and the skin was prepped and draped in a routine sterile fashion. The areas to be injected were each anesthetized with 1 ml of 1% Lidocaine. A 22 gauge 3.5 inch spinal needle was carefully advanced under fluoroscopic guidance to the left L5-S1 transforaminal space  0.5 ml of 70% of Omnipaque was injected to confirm proper needle placement and absence of subdural or vascular flow Once proper placement was confirmed, 0.5ml of 0.25 marcaine and 60 mg of kenalog were injected through the spinal needle. The above procedure was then repeated at the Left  S1-S2 transforaminal space using 60 mg of kenalog. Fluoroscopic guidance was used intermittently over a 10-minute period to insure proper needle placement and his safety. A hard copy of the fluoroscopic image has been placed in his chart and is saved on the C-arm hard drive. He was monitored for 30 minutes after the procedure and discharged home in a stable fashion with a routine follow up. Procedural Diagnosis:     ICD-10-CM    1.  Spondylolisthesis of lumbar region  M43.16 FL NERVE BLOCK LUMBOSACRAL 1ST     FL NERVE BLOCK LUMBOSACRAL EACH ADD     triamcinolone acetonide (KENALOG-40) injection 120 mg      2. Lumbar radiculopathy  M54.16 FL NERVE BLOCK LUMBOSACRAL 1ST     FL NERVE BLOCK LUMBOSACRAL EACH ADD     triamcinolone acetonide (KENALOG-40) injection 120 mg      3. Foraminal stenosis of lumbar region  M48.061 FL NERVE BLOCK LUMBOSACRAL 1ST     FL NERVE BLOCK LUMBOSACRAL EACH ADD     triamcinolone acetonide (KENALOG-40) injection 120 mg      4.  DDD (degenerative disc disease), lumbar  M51.36 FL NERVE BLOCK LUMBOSACRAL 1ST     FL NERVE BLOCK LUMBOSACRAL EACH ADD     triamcinolone acetonide (KENALOG-40) injection 120 mg         Nicole Stanton MD  08/25/22

## 2022-09-01 ENCOUNTER — TELEPHONE (OUTPATIENT)
Dept: ORTHOPEDIC SURGERY | Age: 82
End: 2022-09-01

## 2022-09-07 ENCOUNTER — TELEPHONE (OUTPATIENT)
Dept: CARDIOLOGY CLINIC | Age: 82
End: 2022-09-07

## 2022-09-07 NOTE — TELEPHONE ENCOUNTER
Pt comes in for today's echo and reports that he is nearly out of Eliquis. Pt is requesting samples of Eliquis, 5 mg. Pt would like to  while here today for his apt. Thank you.

## 2022-09-08 NOTE — PROGRESS NOTES
SW spoke with pt and spouse in prehab. Pt is scheduled for a L ESTELLE on 8-11-20 by Dr. Rudi Yanes. Pt plans to d/c home with family support and HH. Pt will need DME ( 2 [facility] and [facility] ). SW offered choices of HH & pt prefers [facility]. Referral made to [facility]. Rinvoq Counseling: I discussed with the patient the risks of Rinvoq therapy including but not limited to upper respiratory tract infections, shingles, cold sores, bronchitis, nausea, cough, fever, acne, and headache. Live vaccines should be avoided.  This medication has been linked to serious infections; higher rate of mortality; malignancy and lymphoproliferative disorders; major adverse cardiovascular events; thrombosis; thrombocytopenia, anemia, and neutropenia; lipid elevations; liver enzyme elevations; and gastrointestinal perforations.

## 2022-09-19 ENCOUNTER — OFFICE VISIT (OUTPATIENT)
Dept: CARDIOLOGY CLINIC | Age: 82
End: 2022-09-19
Payer: MEDICARE

## 2022-09-19 VITALS
DIASTOLIC BLOOD PRESSURE: 70 MMHG | WEIGHT: 200 LBS | SYSTOLIC BLOOD PRESSURE: 126 MMHG | BODY MASS INDEX: 33.32 KG/M2 | HEART RATE: 50 BPM | HEIGHT: 65 IN

## 2022-09-19 DIAGNOSIS — I10 ESSENTIAL HYPERTENSION, BENIGN: Primary | ICD-10-CM

## 2022-09-19 DIAGNOSIS — E78.5 DYSLIPIDEMIA: ICD-10-CM

## 2022-09-19 DIAGNOSIS — I48.20 CHRONIC ATRIAL FIBRILLATION (HCC): ICD-10-CM

## 2022-09-19 DIAGNOSIS — I25.118 ATHEROSCLEROSIS OF NATIVE CORONARY ARTERY OF NATIVE HEART WITH STABLE ANGINA PECTORIS (HCC): ICD-10-CM

## 2022-09-19 PROCEDURE — G8427 DOCREV CUR MEDS BY ELIG CLIN: HCPCS | Performed by: INTERNAL MEDICINE

## 2022-09-19 PROCEDURE — 93000 ELECTROCARDIOGRAM COMPLETE: CPT | Performed by: INTERNAL MEDICINE

## 2022-09-19 PROCEDURE — 1036F TOBACCO NON-USER: CPT | Performed by: INTERNAL MEDICINE

## 2022-09-19 PROCEDURE — 1123F ACP DISCUSS/DSCN MKR DOCD: CPT | Performed by: INTERNAL MEDICINE

## 2022-09-19 PROCEDURE — 99214 OFFICE O/P EST MOD 30 MIN: CPT | Performed by: INTERNAL MEDICINE

## 2022-09-19 PROCEDURE — G8417 CALC BMI ABV UP PARAM F/U: HCPCS | Performed by: INTERNAL MEDICINE

## 2022-09-19 RX ORDER — HYDROCHLOROTHIAZIDE 12.5 MG/1
12.5 CAPSULE, GELATIN COATED ORAL DAILY
COMMUNITY

## 2022-09-19 ASSESSMENT — ENCOUNTER SYMPTOMS
BACK PAIN: 0
COUGH: 0
ABDOMINAL PAIN: 0
SHORTNESS OF BREATH: 0

## 2022-09-19 NOTE — PROGRESS NOTES
[address]      22      NAME:  Piedad Rodriguez  : 1940  MRN: [id number]      SUBJECTIVE:   Piedad Rodriguez is a 80 y.o. male seen for a follow up visit regarding the following:     Chief Complaint   Patient presents with    Hypertension    Coronary Artery Disease       HPI:   Patient with history of persistent atrial fibrillation and CAD. He had PCI of the LAD in . He has done well and no angina. He is stable with rate controlled atrial fibrillation and on Eliquis. Past Medical History, Past Surgical History, Family history, Social History, and Medications were all reviewed with the patient today and updated as necessary. Current Outpatient Medications   Medication Sig Dispense Refill    hydroCHLOROthiazide (MICROZIDE) 12.5 MG capsule Take 12.5 mg by mouth daily      acetaminophen (TYLENOL) 325 MG tablet Take 500 mg by mouth every 4 hours as needed      apixaban (ELIQUIS) 5 MG TABS tablet Take 5 mg by mouth 2 times daily      aspirin 81 MG EC tablet Take 81 mg by mouth every 12 hours      clotrimazole-betamethasone (LOTRISONE) 1-0.05 % cream Apply topically 2 times daily      docusate (COLACE, DULCOLAX) 100 MG CAPS Take 100 mg by mouth 2 times daily      ganciclovir (ZIRGAN) 0.15 % GEL ophthalmic gel Apply 1 drop to eye every evening      losartan (COZAAR) 100 MG tablet Take 100 mg by mouth daily      metoprolol tartrate (LOPRESSOR) 25 MG tablet Take 12.5 mg by mouth 2 times daily      nitroGLYCERIN (NITROSTAT) 0.4 MG SL tablet Place 0.4 mg under the tongue      polyethylene glycol (GLYCOLAX) 17 GM/SCOOP powder Take 0.4 g/kg by mouth daily      pravastatin (PRAVACHOL) 40 MG tablet Take 20 mg by mouth Every other day       No current facility-administered medications for this visit.      Patient Active Problem List    Diagnosis Date Noted    Type 2 diabetes mellitus ([facility] 2022    Postoperative anemia due to acute blood loss 01/06/2022    Osteoarthritis of glenohumeral joint, right 01/06/2022    Osteoarthritis of right glenohumeral joint 01/04/2022    Urinary retention 08/19/2020    Arthritis of left hip 08/11/2020    Bilateral carotid artery stenosis 07/28/2017 02/2017:  < 50% bilateral        Chronic atrial fibrillation ([address] 10/02/2015    Bacteremia due to Gram-negative bacteria 06/28/2015    Obstructed, uropathy 06/27/2015    Hypotension 06/27/2015    Acute pyelonephritis 06/26/2015    OR[place]O (acute kidney injury) ([address] 06/26/2015    Sepsis ([address] 06/26/2015    Ureteral stone 06/26/2015    WPW (Raf-Parkinson-White syndrome) 10/10/2014    Dyslipidemia 05/27/2010    Atherosclerosis of native coronary artery of native heart with stable angina pectoris ([address] 05/27/2010     2010:  PCI LAD Xience x 2        Essential hypertension, benign 05/27/2010      Family History   Problem Relation Age of Onset    Heart Disease Sister         mild heart attack    Cancer Brother     Diabetes Brother     Diabetes Brother     Diabetes Father     Heart Disease Mother      Social History     Tobacco Use    Smoking status: Never    Smokeless tobacco: Never   Substance Use Topics    Alcohol use: No       Review Of Symptoms    Review of Systems   Constitutional: Negative for fever and malaise/fatigue. HENT:  Negative for nosebleeds. Cardiovascular:  Negative for chest pain, dyspnea on exertion and palpitations. Respiratory:  Negative for cough and shortness of breath. Musculoskeletal:  Negative for back pain, muscle cramps, muscle weakness and myalgias. Gastrointestinal:  Negative for abdominal pain. Neurological:  Negative for dizziness. Physical Exam  Blood pressure 126/70, pulse 50, height 5' 5\" (1.651 m), weight 200 lb (90.7 kg). Physical Exam  HENT:      Head: Normocephalic and atraumatic. Eyes:      Extraocular Movements: Extraocular movements intact. Cardiovascular:      Rate and Rhythm: Normal rate.  Rhythm irregularly irregular. Heart sounds: No murmur heard. Pulmonary:      Effort: Pulmonary effort is normal.      Breath sounds: Normal breath sounds. Abdominal:      Palpations: Abdomen is soft. Musculoskeletal:         General: Normal range of motion. Skin:     General: Skin is warm and dry. Neurological:      General: No focal deficit present. Mental Status: He is oriented to person, place, and time. Medical problems, medical history and test results were reviewed with the patient today.       No results found for: CHOL  No results found for: TRIG  No results found for: HDL  No results found for: LDLCHOLESTEROL, LDLCALC  No results found for: LABVLDL, VLDL  No results found for: [facility]     Lab Results   Component Value Date    LABA1C 6.7 (H) 01/06/2022     Lab Results   Component Value Date     01/06/2022        Lab Results   Component Value Date     01/07/2022    K 5.1 01/07/2022     01/07/2022    CO2 26 01/07/2022    BUN 28 (H) 01/07/2022    CREATININE 1.60 (H) 01/07/2022    GLUCOSE 299 (H) 01/07/2022    CALCIUM 8.3 01/07/2022    PROT 7.0 12/29/2021    LABALBU 3.3 12/29/2021    BILITOT 2.3 (H) 12/29/2021    ALKPHOS 124 12/29/2021    AST 31 12/29/2021    ALT 38 12/29/2021    GFRAA 54 (L) 01/07/2022    AGRATIO 0.9 (L) 12/29/2021    GLOB 3.7 (H) 12/29/2021       Lab Results   Component Value Date/Time     01/07/2022 03:56 AM    K 5.1 01/07/2022 03:56 AM     01/07/2022 03:56 AM    CO2 26 01/07/2022 03:56 AM    BUN 28 01/07/2022 03:56 AM    CREATININE 1.60 01/07/2022 03:56 AM    GLUCOSE 299 01/07/2022 03:56 AM    CALCIUM 8.3 01/07/2022 03:56 AM        Lab Results   Component Value Date    WBC 11.5 (H) 01/07/2022    HGB 13.8 01/07/2022    HCT 42.0 01/07/2022    MCV 94.2 01/07/2022     01/07/2022             ASSESSMENT:    Diagnoses and all orders for this visit:      Atherosclerosis of native coronary artery of native heart without angina pectoris - No angina. On ASA 81mg daily. Medical therapy appropriate. BP and lipids are controlled. Essential hypertension, benign - This is well controlled on metoprolol, losartan and HCTZ. Cr stable. K/Mg stable. Dyslipidemia- Stable on pravastatin. Lipids reviewed and LDL 80. Recheck in 6 months      Chronic atrial fibrillation (HCC) - This is rate controlled and anticoagulated. HR 50 bpm at times but well controlled otherwise. Eliquis to 5mg BID. Hgb reviewed and stable. Problem List Items Addressed This Visit          Circulatory    Chronic atrial fibrillation (HCC)    Relevant Medications    hydroCHLOROthiazide (MICROZIDE) 12.5 MG capsule    Atherosclerosis of native coronary artery of native heart with stable angina pectoris (HCC)    Relevant Medications    hydroCHLOROthiazide (MICROZIDE) 12.5 MG capsule    Essential hypertension, benign - Primary    Relevant Orders    EKG 12 Lead (Completed)       Other    Dyslipidemia       There are no discontinued medications. Patient has been instructed and agrees to call our office with any issues or other concerns related to their cardiac condition(s) and/or complaint(s). Return in about 6 months (around 3/19/2023).        April Izaguirre MD  9/19/2022

## 2023-01-16 ENCOUNTER — OFFICE VISIT (OUTPATIENT)
Dept: ORTHOPEDIC SURGERY | Age: 83
End: 2023-01-16
Payer: MEDICARE

## 2023-01-16 ENCOUNTER — TELEPHONE (OUTPATIENT)
Dept: CARDIOLOGY CLINIC | Age: 83
End: 2023-01-16

## 2023-01-16 DIAGNOSIS — Z96.611 PRESENCE OF RIGHT ARTIFICIAL SHOULDER JOINT: Primary | ICD-10-CM

## 2023-01-16 DIAGNOSIS — Z09 FOLLOW-UP EXAMINATION AFTER ORTHOPEDIC SURGERY: ICD-10-CM

## 2023-01-16 PROCEDURE — G8417 CALC BMI ABV UP PARAM F/U: HCPCS | Performed by: ORTHOPAEDIC SURGERY

## 2023-01-16 PROCEDURE — G8427 DOCREV CUR MEDS BY ELIG CLIN: HCPCS | Performed by: ORTHOPAEDIC SURGERY

## 2023-01-16 PROCEDURE — G8484 FLU IMMUNIZE NO ADMIN: HCPCS | Performed by: ORTHOPAEDIC SURGERY

## 2023-01-16 PROCEDURE — 99212 OFFICE O/P EST SF 10 MIN: CPT | Performed by: ORTHOPAEDIC SURGERY

## 2023-01-16 PROCEDURE — 1123F ACP DISCUSS/DSCN MKR DOCD: CPT | Performed by: ORTHOPAEDIC SURGERY

## 2023-01-16 PROCEDURE — 1036F TOBACCO NON-USER: CPT | Performed by: ORTHOPAEDIC SURGERY

## 2023-01-20 NOTE — TELEPHONE ENCOUNTER
Pt's daughter brings in a note from the pt which states that his Eliquis has changed to a Tier 4. Insurance for a 90 day supply w/ co-insurance is not affordable ($990.70)  Pharmacy suggested discussing Xarelto as the copay is only $132 for a 90 day supply. Pt is also out of Metoprolol 25mg, and has been out for 2 weeks (low pulse of 48 when at PCP). Pt asks if it is ok to stay off? Pt's pulse is 60 today, 1/19/23. Thank you.

## 2023-01-23 NOTE — TELEPHONE ENCOUNTER
Can patient switch from Eliquis to Xarelto? Also can patient stay off Metoprolol. He states he feels better off.      Type 2 diabetes mellitus ([facility] 03/16/2022    Postoperative anemia due to acute blood loss 01/06/2022    Osteoarthritis of glenohumeral joint, right 01/06/2022    Osteoarthritis of right glenohumeral joint 01/04/2022    Urinary retention 08/19/2020    Arthritis of left hip 08/11/2020    Bilateral carotid artery stenosis 07/28/2017 02/2017:  < 50% bilateral          Chronic atrial fibrillation ([facility] 10/02/2015    Bacteremia due to Gram-negative bacteria 06/28/2015    Obstructed, uropathy 06/27/2015    Hypotension 06/27/2015    Acute pyelonephritis 06/26/2015    [place]O (acute kidney injury) ([address] 06/26/2015    Sepsis ([facility] 06/26/2015    Ureteral stone 06/26/2015    WPW (Raf-Parkinson-White syndrome) 10/10/2014    Dyslipidemia 05/27/2010    Atherosclerosis of native coronary artery of native heart with stable angina pectoris [facility]) 05/27/2010       2010:  PCI LAD Xience x 2          Essential hypertension, benign 05/27/2010

## 2023-01-24 NOTE — TELEPHONE ENCOUNTER
Pt daughter Kylee Sánchez calling back about the note Ranjan Antoine wrote 1/20/23 regarding pt RX Eliquis and Metoprolol. Kylee Sánchez has not heard anything and would appreciate a call back.

## 2023-01-30 NOTE — TELEPHONE ENCOUNTER
May switch to Xarelto 20mg daily and stay off of metoprolol. Please monitor BP and heart rate.   Thanks, G

## 2023-03-24 ENCOUNTER — OFFICE VISIT (OUTPATIENT)
Dept: CARDIOLOGY CLINIC | Age: 83
End: 2023-03-24
Payer: MEDICARE

## 2023-03-24 VITALS
SYSTOLIC BLOOD PRESSURE: 128 MMHG | DIASTOLIC BLOOD PRESSURE: 68 MMHG | WEIGHT: 214 LBS | HEART RATE: 62 BPM | BODY MASS INDEX: 35.65 KG/M2 | HEIGHT: 65 IN

## 2023-03-24 DIAGNOSIS — I10 ESSENTIAL HYPERTENSION, BENIGN: ICD-10-CM

## 2023-03-24 DIAGNOSIS — I48.20 CHRONIC ATRIAL FIBRILLATION (HCC): ICD-10-CM

## 2023-03-24 DIAGNOSIS — I25.118 ATHEROSCLEROSIS OF NATIVE CORONARY ARTERY OF NATIVE HEART WITH STABLE ANGINA PECTORIS (HCC): Primary | ICD-10-CM

## 2023-03-24 DIAGNOSIS — E78.5 DYSLIPIDEMIA: ICD-10-CM

## 2023-03-24 PROCEDURE — 3078F DIAST BP <80 MM HG: CPT | Performed by: INTERNAL MEDICINE

## 2023-03-24 PROCEDURE — G8428 CUR MEDS NOT DOCUMENT: HCPCS | Performed by: INTERNAL MEDICINE

## 2023-03-24 PROCEDURE — G8417 CALC BMI ABV UP PARAM F/U: HCPCS | Performed by: INTERNAL MEDICINE

## 2023-03-24 PROCEDURE — 1036F TOBACCO NON-USER: CPT | Performed by: INTERNAL MEDICINE

## 2023-03-24 PROCEDURE — 3074F SYST BP LT 130 MM HG: CPT | Performed by: INTERNAL MEDICINE

## 2023-03-24 PROCEDURE — G8484 FLU IMMUNIZE NO ADMIN: HCPCS | Performed by: INTERNAL MEDICINE

## 2023-03-24 PROCEDURE — 1123F ACP DISCUSS/DSCN MKR DOCD: CPT | Performed by: INTERNAL MEDICINE

## 2023-03-24 PROCEDURE — 99214 OFFICE O/P EST MOD 30 MIN: CPT | Performed by: INTERNAL MEDICINE

## 2023-03-24 RX ORDER — AMLODIPINE BESYLATE 5 MG/1
5 TABLET ORAL DAILY
COMMUNITY

## 2023-03-24 ASSESSMENT — ENCOUNTER SYMPTOMS
BACK PAIN: 0
ABDOMINAL PAIN: 0
COUGH: 0
SHORTNESS OF BREATH: 0

## 2023-03-24 NOTE — PROGRESS NOTES
[address], [address]      23      NAME:  Jayne Paul  : 1940  MRN: [id number]      SUBJECTIVE:   Jayne Paul is a 80 y.o. male seen for a follow up visit regarding the following:     Chief Complaint   Patient presents with    Coronary Artery Disease    Hypertension       HPI:   Patient with history of persistent atrial fibrillation and CAD. He had PCI of the LAD in . He has done well and no angina. He is stable with rate controlled atrial fibrillation and on Eliquis. Echo was normal 2022. Past Medical History, Past Surgical History, Family history, Social History, and Medications were all reviewed with the patient today and updated as necessary. Current Outpatient Medications   Medication Sig Dispense Refill    amLODIPine (NORVASC) 5 MG tablet Take 5 mg by mouth daily      rivaroxaban (XARELTO) 15 MG TABS tablet Take 1 tablet by mouth daily (with breakfast) 90 tablet 3    hydroCHLOROthiazide (MICROZIDE) 12.5 MG capsule Take 12.5 mg by mouth daily      acetaminophen (TYLENOL) 325 MG tablet Take 500 mg by mouth every 4 hours as needed      aspirin 81 MG EC tablet Take 81 mg by mouth every 12 hours      clotrimazole-betamethasone (LOTRISONE) 1-0.05 % cream Apply topically 2 times daily      docusate (COLACE, DULCOLAX) 100 MG CAPS Take 100 mg by mouth 2 times daily      ganciclovir (ZIRGAN) 0.15 % GEL ophthalmic gel Apply 1 drop to eye every evening      losartan (COZAAR) 100 MG tablet Take 100 mg by mouth daily      nitroGLYCERIN (NITROSTAT) 0.4 MG SL tablet Place 0.4 mg under the tongue      polyethylene glycol (GLYCOLAX) 17 GM/SCOOP powder Take 0.4 g/kg by mouth daily      pravastatin (PRAVACHOL) 40 MG tablet Take 20 mg by mouth Every other day       No current facility-administered medications for this visit.      Patient Active Problem List    Diagnosis Date Noted    Type 2 diabetes mellitus ([facility] 2022

## 2023-05-19 NOTE — TELEPHONE ENCOUNTER
MEDICATION REFILL REQUEST      Name of Medication:  Xarelto   Dose:  15 mg  Frequency:    Quantity:    Days' supply:  80      Pharmacy Name/Location:  [facility]/Please call in today because he will be out ove weekend.

## 2023-09-22 ENCOUNTER — OFFICE VISIT (OUTPATIENT)
Age: 83
End: 2023-09-22

## 2023-09-22 VITALS
HEART RATE: 60 BPM | HEIGHT: 65 IN | BODY MASS INDEX: 33.99 KG/M2 | SYSTOLIC BLOOD PRESSURE: 136 MMHG | WEIGHT: 204 LBS | DIASTOLIC BLOOD PRESSURE: 72 MMHG

## 2023-09-22 DIAGNOSIS — I10 ESSENTIAL HYPERTENSION, BENIGN: ICD-10-CM

## 2023-09-22 DIAGNOSIS — I48.20 CHRONIC ATRIAL FIBRILLATION (HCC): ICD-10-CM

## 2023-09-22 DIAGNOSIS — I25.118 ATHEROSCLEROSIS OF NATIVE CORONARY ARTERY OF NATIVE HEART WITH STABLE ANGINA PECTORIS (HCC): Primary | ICD-10-CM

## 2023-09-22 RX ORDER — GLIPIZIDE 5 MG/1
5 TABLET ORAL
COMMUNITY

## 2023-09-22 ASSESSMENT — ENCOUNTER SYMPTOMS
ABDOMINAL PAIN: 0
BACK PAIN: 0
COUGH: 0
SHORTNESS OF BREATH: 0

## 2023-09-22 NOTE — PROGRESS NOTES
01/07/2022 03:56 AM    CREATININE 1.60 01/07/2022 03:56 AM    GLUCOSE 299 01/07/2022 03:56 AM    CALCIUM 8.3 01/07/2022 03:56 AM        Lab Results   Component Value Date    WBC 11.5 (H) 01/07/2022    HGB 13.8 01/07/2022    HCT 42.0 01/07/2022    MCV 94.2 01/07/2022     01/07/2022             ASSESSMENT:    Diagnoses and all orders for this visit:      Atherosclerosis of native coronary artery of native heart without angina pectoris - No angina. On ASA 81mg daily. Medical therapy appropriate. BP and lipids are controlled. Essential hypertension, benign - This is well controlled on metoprolol, losartan and HCTZ. Cr stable. K/Mg stable. Dyslipidemia- Stable on pravastatin. Lipids reviewed and LDL 99. He has been not using statin therapy due to leg pain. Pain seems more neuromuscular. Needs to take medication. Chronic atrial fibrillation (HCC) - This is rate controlled and anticoagulated. HR 50 bpm at times but well controlled otherwise on no rate control therapies. Stable on Xarelto 15mg daily. Hgb reviewed and stable 05/2023. Cr 2.0 with GFR 30 by labs 05/2023.         Problem List Items Addressed This Visit          Circulatory    Chronic atrial fibrillation (HCC)    Relevant Medications    rivaroxaban (XARELTO) 15 MG TABS tablet    Other Relevant Orders    Echo (TTE) complete with contrast, bubble, strain, and 3D PRN    Atherosclerosis of native coronary artery of native heart with stable angina pectoris ([address]) - Primary    Relevant Medications    rivaroxaban (XARELTO) 15 MG TABS tablet    Other Relevant Orders    Echo (TTE) complete with contrast, bubble, strain, and 3D PRN    Essential hypertension, benign    Relevant Orders    EKG 12 Lead (Completed)     Medications Discontinued During This Encounter   Medication Reason    rivaroxaban (XARELTO) 15 MG TABS tablet REORDER                   Patient has been instructed and agrees to call our office with any issues or other

## 2023-09-27 ENCOUNTER — TELEPHONE (OUTPATIENT)
Age: 83
End: 2023-09-27

## 2023-09-27 NOTE — TELEPHONE ENCOUNTER
Requesting samples of Xarelto due to being in the doughnut hole and can't afford.  Please call sharita Ling Velazquezvanessa [phone]

## 2023-10-26 ENCOUNTER — TELEPHONE (OUTPATIENT)
Age: 83
End: 2023-10-26

## 2023-11-09 ENCOUNTER — TELEPHONE (OUTPATIENT)
Age: 83
End: 2023-11-09

## 2023-11-09 NOTE — TELEPHONE ENCOUNTER
Pt daughter is calling asking for samples of Xarelto and eliquis please call daughter  Dexter Galloway to let her know if we have any.   [phone]

## 2023-11-09 NOTE — TELEPHONE ENCOUNTER
Xarelto 15mg samples left at  in [facility].  Mac Kennedy, patients daughter to inform her

## 2023-12-11 ENCOUNTER — TELEPHONE (OUTPATIENT)
Age: 83
End: 2023-12-11

## 2023-12-11 NOTE — TELEPHONE ENCOUNTER
Pt called and stated that he is almost out of his Xarelto 15 MG, has 2 pills left and asked if we had any samples he could come . Please call and advise.

## 2024-03-11 ENCOUNTER — TELEPHONE (OUTPATIENT)
Age: 84
End: 2024-03-11

## 2024-03-25 ENCOUNTER — TELEPHONE (OUTPATIENT)
Age: 84
End: 2024-03-25

## 2024-03-25 NOTE — TELEPHONE ENCOUNTER
Please call patient's daughter Arielle regarding Xarelto  being to expensive and [facility] is going to be faxing over a Tier reduction form.

## 2024-04-08 ENCOUNTER — OFFICE VISIT (OUTPATIENT)
Age: 84
End: 2024-04-08
Payer: MEDICARE

## 2024-04-08 ENCOUNTER — TELEPHONE (OUTPATIENT)
Dept: CARDIAC CATH/INVASIVE PROCEDURES | Age: 84
End: 2024-04-08

## 2024-04-08 VITALS
SYSTOLIC BLOOD PRESSURE: 138 MMHG | WEIGHT: 204 LBS | HEART RATE: 60 BPM | BODY MASS INDEX: 33.99 KG/M2 | HEIGHT: 65 IN | DIASTOLIC BLOOD PRESSURE: 80 MMHG

## 2024-04-08 DIAGNOSIS — I25.118 ATHEROSCLEROSIS OF NATIVE CORONARY ARTERY OF NATIVE HEART WITH STABLE ANGINA PECTORIS (HCC): Primary | ICD-10-CM

## 2024-04-08 DIAGNOSIS — I48.20 CHRONIC ATRIAL FIBRILLATION (HCC): ICD-10-CM

## 2024-04-08 DIAGNOSIS — I10 ESSENTIAL HYPERTENSION, BENIGN: ICD-10-CM

## 2024-04-08 DIAGNOSIS — E78.5 DYSLIPIDEMIA: Chronic | ICD-10-CM

## 2024-04-08 DIAGNOSIS — I65.23 BILATERAL CAROTID ARTERY STENOSIS: ICD-10-CM

## 2024-04-08 PROCEDURE — 1036F TOBACCO NON-USER: CPT | Performed by: INTERNAL MEDICINE

## 2024-04-08 PROCEDURE — 3079F DIAST BP 80-89 MM HG: CPT | Performed by: INTERNAL MEDICINE

## 2024-04-08 PROCEDURE — 1123F ACP DISCUSS/DSCN MKR DOCD: CPT | Performed by: INTERNAL MEDICINE

## 2024-04-08 PROCEDURE — G8428 CUR MEDS NOT DOCUMENT: HCPCS | Performed by: INTERNAL MEDICINE

## 2024-04-08 PROCEDURE — G8417 CALC BMI ABV UP PARAM F/U: HCPCS | Performed by: INTERNAL MEDICINE

## 2024-04-08 PROCEDURE — 99214 OFFICE O/P EST MOD 30 MIN: CPT | Performed by: INTERNAL MEDICINE

## 2024-04-08 PROCEDURE — 3075F SYST BP GE 130 - 139MM HG: CPT | Performed by: INTERNAL MEDICINE

## 2024-04-08 RX ORDER — AMLODIPINE BESYLATE 5 MG/1
5 TABLET ORAL DAILY
Qty: 90 TABLET | Refills: 3 | Status: SHIPPED | OUTPATIENT
Start: 2024-04-08

## 2024-04-08 RX ORDER — HYDROCHLOROTHIAZIDE 12.5 MG/1
12.5 CAPSULE, GELATIN COATED ORAL DAILY
Qty: 90 CAPSULE | Refills: 3 | Status: SHIPPED | OUTPATIENT
Start: 2024-04-08

## 2024-04-08 ASSESSMENT — ENCOUNTER SYMPTOMS
ABDOMINAL PAIN: 0
SHORTNESS OF BREATH: 0
COUGH: 0
BACK PAIN: 0

## 2024-04-08 NOTE — PROGRESS NOTES
01/07/2022 03:56 AM    GLUCOSE 299 01/07/2022 03:56 AM    CALCIUM 8.3 01/07/2022 03:56 AM        Lab Results   Component Value Date    WBC 11.5 (H) 01/07/2022    HGB 13.8 01/07/2022    HCT 42.0 01/07/2022    MCV 94.2 01/07/2022     01/07/2022             ASSESSMENT:    Diagnoses and all orders for this visit:      Atherosclerosis of native coronary artery of native heart without angina pectoris - No angina.  On ASA 81mg daily.  Medical therapy appropriate.  BP and lipids are controlled.        Essential hypertension, benign - This is well controlled on metoprolol, losartan and HCTZ.  Cr stable.  K/Mg stable.        Dyslipidemia- Stable on pravastatin.  Lipids reviewed and .  He has been not using statin therapy due to leg pain.  Pain seems more neuromuscular.  Needs to take medication.        Chronic atrial fibrillation (HCC) - This is rate controlled and anticoagulated.  HR 50 bpm at times but well controlled otherwise on no rate control therapies.  Stable on Xarelto 15mg daily.  Hgb reviewed and stable 05/2023.  Cr 2.0 with GFR 30 by labs 05/2023.  Start Eliquis 2.5mg BID for candada.  Refer for watchman procedure.        Problem List Items Addressed This Visit          Circulatory    Chronic atrial fibrillation (HCC)    Relevant Medications    amLODIPine (NORVASC) 5 MG tablet    hydroCHLOROthiazide 12.5 MG capsule    Atherosclerosis of native coronary artery of native heart with stable angina pectoris (HCC) - Primary    Relevant Medications    amLODIPine (NORVASC) 5 MG tablet    hydroCHLOROthiazide 12.5 MG capsule    Essential hypertension, benign    Relevant Medications    amLODIPine (NORVASC) 5 MG tablet    hydroCHLOROthiazide 12.5 MG capsule    Bilateral carotid artery stenosis       Other    Dyslipidemia (Chronic)     Medications Discontinued During This Encounter   Medication Reason    hydroCHLOROthiazide (MICROZIDE) 12.5 MG capsule REORDER    amLODIPine (NORVASC) 5 MG tablet REORDER

## 2024-04-08 NOTE — TELEPHONE ENCOUNTER
The patient's daughter Arielle YIN was contacted to discuss LAAO. The patient has a watchman consult with Dr. Hoffman on 4/24/2024 @ 3 pm at [facility].  Watchman coordinator contact ([phone]) information provided.

## 2024-04-12 ENCOUNTER — TELEPHONE (OUTPATIENT)
Age: 84
End: 2024-04-12

## 2024-04-12 NOTE — TELEPHONE ENCOUNTER
MEDICATION REFILL REQUEST      Name of Medication:  Eliquis  Dose:  ?  Frequency:  BID  Quantity:  180  Days' supply:  90      Pharmacy Name/Location:  please call pt daughter Arielle to confirm dose of med-[phone]  Pt wants this called in to [facility] in [place]  Pt ID-[id number]

## 2024-04-12 NOTE — TELEPHONE ENCOUNTER
Patient is to take Eliquis 2.5mg tablets twice daily. I will get the prescription sent to [facility].

## 2024-04-16 ENCOUNTER — APPOINTMENT (OUTPATIENT)
Dept: GENERAL RADIOLOGY | Age: 84
End: 2024-04-16
Payer: MEDICARE

## 2024-04-16 ENCOUNTER — HOSPITAL ENCOUNTER (INPATIENT)
Age: 84
LOS: 2 days | Discharge: HOME | End: 2024-04-18
Attending: EMERGENCY MEDICINE | Admitting: FAMILY MEDICINE
Payer: MEDICARE

## 2024-04-16 ENCOUNTER — APPOINTMENT (OUTPATIENT)
Dept: CT IMAGING | Age: 84
End: 2024-04-16
Payer: MEDICARE

## 2024-04-16 ENCOUNTER — APPOINTMENT (OUTPATIENT)
Dept: MRI IMAGING | Age: 84
End: 2024-04-16
Payer: MEDICARE

## 2024-04-16 DIAGNOSIS — I63.9 CEREBROVASCULAR ACCIDENT (CVA), UNSPECIFIED MECHANISM (HCC): ICD-10-CM

## 2024-04-16 DIAGNOSIS — G93.6 CEREBRAL EDEMA (HCC): ICD-10-CM

## 2024-04-16 DIAGNOSIS — R41.0 DISORIENTATION: ICD-10-CM

## 2024-04-16 DIAGNOSIS — R56.9 SEIZURE (HCC): Primary | ICD-10-CM

## 2024-04-16 DIAGNOSIS — R73.9 HYPERGLYCEMIA: ICD-10-CM

## 2024-04-16 PROBLEM — R93.0 ABNORMAL CT OF THE HEAD: Status: ACTIVE | Noted: 2024-04-16

## 2024-04-16 LAB
ALBUMIN SERPL-MCNC: 2.6 G/DL (ref 3.2–4.6)
ALBUMIN/GLOB SERPL: 0.7 (ref 0.4–1.6)
ALP SERPL-CCNC: 390 U/L (ref 50–136)
ALT SERPL-CCNC: 85 U/L (ref 12–65)
ANION GAP SERPL CALC-SCNC: 2 MMOL/L (ref 2–11)
APPEARANCE UR: CLEAR
AST SERPL-CCNC: 57 U/L (ref 15–37)
BACTERIA URNS QL MICRO: NEGATIVE /HPF
BASOPHILS # BLD: 0.1 K/UL (ref 0–0.2)
BASOPHILS NFR BLD: 1 % (ref 0–2)
BILIRUB SERPL-MCNC: 1.6 MG/DL (ref 0.2–1.1)
BILIRUB UR QL: NEGATIVE
BUN SERPL-MCNC: 27 MG/DL (ref 8–23)
CALCIUM SERPL-MCNC: 8.5 MG/DL (ref 8.3–10.4)
CASTS URNS QL MICRO: ABNORMAL /LPF
CHLORIDE SERPL-SCNC: 106 MMOL/L (ref 103–113)
CO2 SERPL-SCNC: 27 MMOL/L (ref 21–32)
COLOR UR: ABNORMAL
CREAT SERPL-MCNC: 1.6 MG/DL (ref 0.8–1.5)
DIFFERENTIAL METHOD BLD: ABNORMAL
EKG DIAGNOSIS: ABNORMAL
EKG Q-T INTERVAL: 404 MS
EKG QRS DURATION: 84 MS
EKG QTC CALCULATION (BAZETT): 416 MS
EKG R AXIS: 61 DEGREES
EKG T AXIS: 72 DEGREES
EKG VENTRICULAR RATE: 64 BPM
EOSINOPHIL # BLD: 0.1 K/UL (ref 0–0.8)
EOSINOPHIL NFR BLD: 1 % (ref 0.5–7.8)
EPI CELLS #/AREA URNS HPF: ABNORMAL /HPF
ERYTHROCYTE [DISTWIDTH] IN BLOOD BY AUTOMATED COUNT: 13 % (ref 11.9–14.6)
EST. AVERAGE GLUCOSE BLD GHB EST-MCNC: 220 MG/DL
GLOBULIN SER CALC-MCNC: 3.7 G/DL (ref 2.8–4.5)
GLUCOSE BLD STRIP.AUTO-MCNC: 184 MG/DL (ref 65–100)
GLUCOSE BLD STRIP.AUTO-MCNC: 195 MG/DL (ref 65–100)
GLUCOSE BLD STRIP.AUTO-MCNC: 211 MG/DL (ref 65–100)
GLUCOSE BLD STRIP.AUTO-MCNC: 261 MG/DL (ref 65–100)
GLUCOSE BLD STRIP.AUTO-MCNC: 353 MG/DL (ref 65–100)
GLUCOSE SERPL-MCNC: 234 MG/DL (ref 65–100)
GLUCOSE UR STRIP.AUTO-MCNC: >1000 MG/DL
HBA1C MFR BLD: 9.3 % (ref 4.8–5.6)
HCT VFR BLD AUTO: 47.5 % (ref 41.1–50.3)
HGB BLD-MCNC: 15.5 G/DL (ref 13.6–17.2)
HGB UR QL STRIP: NEGATIVE
IMM GRANULOCYTES # BLD AUTO: 0.1 K/UL (ref 0–0.5)
IMM GRANULOCYTES NFR BLD AUTO: 1 % (ref 0–5)
KETONES UR QL STRIP.AUTO: NEGATIVE MG/DL
LACTATE SERPL-SCNC: 1.7 MMOL/L (ref 0.4–2)
LACTATE SERPL-SCNC: 2.3 MMOL/L (ref 0.4–2)
LEUKOCYTE ESTERASE UR QL STRIP.AUTO: ABNORMAL
LYMPHOCYTES # BLD: 1.7 K/UL (ref 0.5–4.6)
LYMPHOCYTES NFR BLD: 15 % (ref 13–44)
MCH RBC QN AUTO: 31.8 PG (ref 26.1–32.9)
MCHC RBC AUTO-ENTMCNC: 32.6 G/DL (ref 31.4–35)
MCV RBC AUTO: 97.5 FL (ref 82–102)
MONOCYTES # BLD: 1.2 K/UL (ref 0.1–1.3)
MONOCYTES NFR BLD: 10 % (ref 4–12)
NEUTS SEG # BLD: 8.7 K/UL (ref 1.7–8.2)
NEUTS SEG NFR BLD: 72 % (ref 43–78)
NITRITE UR QL STRIP.AUTO: NEGATIVE
NRBC # BLD: 0 K/UL (ref 0–0.2)
PH UR STRIP: 5.5 (ref 5–9)
PLATELET # BLD AUTO: 211 K/UL (ref 150–450)
PMV BLD AUTO: 11.9 FL (ref 9.4–12.3)
POTASSIUM SERPL-SCNC: 4.2 MMOL/L (ref 3.5–5.1)
PROT SERPL-MCNC: 6.3 G/DL (ref 6.3–8.2)
PROT UR STRIP-MCNC: ABNORMAL MG/DL
RBC # BLD AUTO: 4.87 M/UL (ref 4.23–5.6)
RBC #/AREA URNS HPF: ABNORMAL /HPF
SERVICE CMNT-IMP: ABNORMAL
SODIUM SERPL-SCNC: 135 MMOL/L (ref 136–146)
SP GR UR REFRACTOMETRY: 1.02 (ref 1–1.02)
UROBILINOGEN UR QL STRIP.AUTO: 1 EU/DL (ref 0.2–1)
WBC # BLD AUTO: 11.9 K/UL (ref 4.3–11.1)
WBC URNS QL MICRO: ABNORMAL /HPF

## 2024-04-16 PROCEDURE — 6360000004 HC RX CONTRAST MEDICATION: Performed by: EMERGENCY MEDICINE

## 2024-04-16 PROCEDURE — 2580000003 HC RX 258: Performed by: FAMILY MEDICINE

## 2024-04-16 PROCEDURE — 85025 COMPLETE CBC W/AUTO DIFF WBC: CPT

## 2024-04-16 PROCEDURE — 2580000003 HC RX 258: Performed by: EMERGENCY MEDICINE

## 2024-04-16 PROCEDURE — 6370000000 HC RX 637 (ALT 250 FOR IP): Performed by: FAMILY MEDICINE

## 2024-04-16 PROCEDURE — 99285 EMERGENCY DEPT VISIT HI MDM: CPT

## 2024-04-16 PROCEDURE — A9579 GAD-BASE MR CONTRAST NOS,1ML: HCPCS | Performed by: EMERGENCY MEDICINE

## 2024-04-16 PROCEDURE — 1100000003 HC PRIVATE W/ TELEMETRY

## 2024-04-16 PROCEDURE — 80053 COMPREHEN METABOLIC PANEL: CPT

## 2024-04-16 PROCEDURE — 93005 ELECTROCARDIOGRAM TRACING: CPT | Performed by: EMERGENCY MEDICINE

## 2024-04-16 PROCEDURE — 6360000002 HC RX W HCPCS: Performed by: FAMILY MEDICINE

## 2024-04-16 PROCEDURE — 71046 X-RAY EXAM CHEST 2 VIEWS: CPT

## 2024-04-16 PROCEDURE — 81001 URINALYSIS AUTO W/SCOPE: CPT

## 2024-04-16 PROCEDURE — 93010 ELECTROCARDIOGRAM REPORT: CPT | Performed by: INTERNAL MEDICINE

## 2024-04-16 PROCEDURE — 83605 ASSAY OF LACTIC ACID: CPT

## 2024-04-16 PROCEDURE — 70553 MRI BRAIN STEM W/O & W/DYE: CPT

## 2024-04-16 PROCEDURE — 70450 CT HEAD/BRAIN W/O DYE: CPT

## 2024-04-16 PROCEDURE — 96374 THER/PROPH/DIAG INJ IV PUSH: CPT

## 2024-04-16 PROCEDURE — 82962 GLUCOSE BLOOD TEST: CPT

## 2024-04-16 PROCEDURE — 83036 HEMOGLOBIN GLYCOSYLATED A1C: CPT

## 2024-04-16 PROCEDURE — 6370000000 HC RX 637 (ALT 250 FOR IP): Performed by: EMERGENCY MEDICINE

## 2024-04-16 PROCEDURE — 96361 HYDRATE IV INFUSION ADD-ON: CPT

## 2024-04-16 RX ORDER — LEVETIRACETAM 500 MG/5ML
500 INJECTION, SOLUTION, CONCENTRATE INTRAVENOUS EVERY 12 HOURS
Status: DISCONTINUED | OUTPATIENT
Start: 2024-04-16 | End: 2024-04-18 | Stop reason: HOSPADM

## 2024-04-16 RX ORDER — ONDANSETRON 2 MG/ML
4 INJECTION INTRAMUSCULAR; INTRAVENOUS EVERY 6 HOURS PRN
Status: DISCONTINUED | OUTPATIENT
Start: 2024-04-16 | End: 2024-04-16

## 2024-04-16 RX ORDER — INSULIN LISPRO 100 [IU]/ML
0-4 INJECTION, SOLUTION INTRAVENOUS; SUBCUTANEOUS NIGHTLY
Status: DISCONTINUED | OUTPATIENT
Start: 2024-04-16 | End: 2024-04-18 | Stop reason: HOSPADM

## 2024-04-16 RX ORDER — DEXAMETHASONE SODIUM PHOSPHATE 10 MG/ML
10 INJECTION INTRAMUSCULAR; INTRAVENOUS ONCE
Status: DISCONTINUED | OUTPATIENT
Start: 2024-04-16 | End: 2024-04-16

## 2024-04-16 RX ORDER — ASPIRIN 81 MG/1
81 TABLET ORAL DAILY
Status: DISCONTINUED | OUTPATIENT
Start: 2024-04-17 | End: 2024-04-18 | Stop reason: HOSPADM

## 2024-04-16 RX ORDER — SODIUM CHLORIDE 9 MG/ML
INJECTION, SOLUTION INTRAVENOUS PRN
Status: DISCONTINUED | OUTPATIENT
Start: 2024-04-16 | End: 2024-04-18 | Stop reason: HOSPADM

## 2024-04-16 RX ORDER — NICOTINE 21 MG/24HR
1 PATCH, TRANSDERMAL 24 HOURS TRANSDERMAL DAILY PRN
Status: DISCONTINUED | OUTPATIENT
Start: 2024-04-16 | End: 2024-04-18 | Stop reason: HOSPADM

## 2024-04-16 RX ORDER — AMLODIPINE BESYLATE 5 MG/1
5 TABLET ORAL DAILY
Status: DISCONTINUED | OUTPATIENT
Start: 2024-04-17 | End: 2024-04-18 | Stop reason: HOSPADM

## 2024-04-16 RX ORDER — SODIUM CHLORIDE 0.9 % (FLUSH) 0.9 %
5-40 SYRINGE (ML) INJECTION PRN
Status: DISCONTINUED | OUTPATIENT
Start: 2024-04-16 | End: 2024-04-18 | Stop reason: HOSPADM

## 2024-04-16 RX ORDER — POLYETHYLENE GLYCOL 3350 17 G/17G
17 POWDER, FOR SOLUTION ORAL DAILY PRN
Status: DISCONTINUED | OUTPATIENT
Start: 2024-04-16 | End: 2024-04-18 | Stop reason: HOSPADM

## 2024-04-16 RX ORDER — SODIUM CHLORIDE 9 MG/ML
INJECTION, SOLUTION INTRAVENOUS CONTINUOUS
Status: ACTIVE | OUTPATIENT
Start: 2024-04-16 | End: 2024-04-17

## 2024-04-16 RX ORDER — HYDROCHLOROTHIAZIDE 12.5 MG/1
12.5 CAPSULE, GELATIN COATED ORAL DAILY
Status: DISCONTINUED | OUTPATIENT
Start: 2024-04-17 | End: 2024-04-18 | Stop reason: HOSPADM

## 2024-04-16 RX ORDER — GUAIFENESIN 600 MG/1
1200 TABLET, EXTENDED RELEASE ORAL 2 TIMES DAILY
Status: DISCONTINUED | OUTPATIENT
Start: 2024-04-16 | End: 2024-04-18 | Stop reason: HOSPADM

## 2024-04-16 RX ORDER — DEXAMETHASONE SODIUM PHOSPHATE 4 MG/ML
4 INJECTION, SOLUTION INTRA-ARTICULAR; INTRALESIONAL; INTRAMUSCULAR; INTRAVENOUS; SOFT TISSUE EVERY 6 HOURS
Status: DISCONTINUED | OUTPATIENT
Start: 2024-04-17 | End: 2024-04-16

## 2024-04-16 RX ORDER — LOSARTAN POTASSIUM 50 MG/1
100 TABLET ORAL DAILY
Status: DISCONTINUED | OUTPATIENT
Start: 2024-04-17 | End: 2024-04-18 | Stop reason: HOSPADM

## 2024-04-16 RX ORDER — IBUPROFEN 600 MG/1
1 TABLET ORAL PRN
Status: DISCONTINUED | OUTPATIENT
Start: 2024-04-16 | End: 2024-04-18 | Stop reason: HOSPADM

## 2024-04-16 RX ORDER — ACETAMINOPHEN 650 MG/1
650 SUPPOSITORY RECTAL EVERY 4 HOURS PRN
Status: DISCONTINUED | OUTPATIENT
Start: 2024-04-16 | End: 2024-04-18 | Stop reason: HOSPADM

## 2024-04-16 RX ORDER — BISACODYL 10 MG
10 SUPPOSITORY, RECTAL RECTAL DAILY PRN
Status: DISCONTINUED | OUTPATIENT
Start: 2024-04-16 | End: 2024-04-18 | Stop reason: HOSPADM

## 2024-04-16 RX ORDER — SODIUM CHLORIDE 0.9 % (FLUSH) 0.9 %
10 SYRINGE (ML) INJECTION AS NEEDED
Status: DISCONTINUED | OUTPATIENT
Start: 2024-04-16 | End: 2024-04-18 | Stop reason: HOSPADM

## 2024-04-16 RX ORDER — SODIUM CHLORIDE 0.9 % (FLUSH) 0.9 %
5-40 SYRINGE (ML) INJECTION EVERY 12 HOURS SCHEDULED
Status: DISCONTINUED | OUTPATIENT
Start: 2024-04-16 | End: 2024-04-18 | Stop reason: HOSPADM

## 2024-04-16 RX ORDER — DEXTROSE MONOHYDRATE 100 MG/ML
INJECTION, SOLUTION INTRAVENOUS CONTINUOUS PRN
Status: DISCONTINUED | OUTPATIENT
Start: 2024-04-16 | End: 2024-04-18 | Stop reason: HOSPADM

## 2024-04-16 RX ORDER — POLYETHYLENE GLYCOL 3350 17 G/17G
34 POWDER, FOR SOLUTION ORAL DAILY
Status: DISCONTINUED | OUTPATIENT
Start: 2024-04-17 | End: 2024-04-18 | Stop reason: HOSPADM

## 2024-04-16 RX ORDER — ATORVASTATIN CALCIUM 40 MG/1
80 TABLET, FILM COATED ORAL NIGHTLY
Status: DISCONTINUED | OUTPATIENT
Start: 2024-04-16 | End: 2024-04-18 | Stop reason: HOSPADM

## 2024-04-16 RX ORDER — CEFDINIR 300 MG/1
300 CAPSULE ORAL EVERY 12 HOURS SCHEDULED
Status: DISCONTINUED | OUTPATIENT
Start: 2024-04-16 | End: 2024-04-18 | Stop reason: HOSPADM

## 2024-04-16 RX ORDER — 0.9 % SODIUM CHLORIDE 0.9 %
1000 INTRAVENOUS SOLUTION INTRAVENOUS ONCE
Status: COMPLETED | OUTPATIENT
Start: 2024-04-16 | End: 2024-04-16

## 2024-04-16 RX ORDER — DOCUSATE SODIUM 100 MG/1
100 CAPSULE, LIQUID FILLED ORAL 2 TIMES DAILY
Status: DISCONTINUED | OUTPATIENT
Start: 2024-04-16 | End: 2024-04-18 | Stop reason: HOSPADM

## 2024-04-16 RX ORDER — LABETALOL HYDROCHLORIDE 5 MG/ML
10 INJECTION, SOLUTION INTRAVENOUS EVERY 10 MIN PRN
Status: DISCONTINUED | OUTPATIENT
Start: 2024-04-16 | End: 2024-04-18 | Stop reason: HOSPADM

## 2024-04-16 RX ORDER — ACETAMINOPHEN 325 MG/1
650 TABLET ORAL EVERY 4 HOURS PRN
Status: DISCONTINUED | OUTPATIENT
Start: 2024-04-16 | End: 2024-04-18 | Stop reason: HOSPADM

## 2024-04-16 RX ORDER — ONDANSETRON 4 MG/1
4 TABLET, ORALLY DISINTEGRATING ORAL EVERY 8 HOURS PRN
Status: DISCONTINUED | OUTPATIENT
Start: 2024-04-16 | End: 2024-04-16

## 2024-04-16 RX ORDER — ASPIRIN 81 MG/1
81 TABLET ORAL EVERY 12 HOURS
Status: DISCONTINUED | OUTPATIENT
Start: 2024-04-16 | End: 2024-04-16

## 2024-04-16 RX ORDER — INSULIN LISPRO 100 [IU]/ML
0-8 INJECTION, SOLUTION INTRAVENOUS; SUBCUTANEOUS
Status: DISCONTINUED | OUTPATIENT
Start: 2024-04-17 | End: 2024-04-18 | Stop reason: HOSPADM

## 2024-04-16 RX ADMIN — GADOTERIDOL 18 ML: 279.3 INJECTION, SOLUTION INTRAVENOUS at 21:27

## 2024-04-16 RX ADMIN — DOCUSATE SODIUM 100 MG: 100 CAPSULE, LIQUID FILLED ORAL at 22:13

## 2024-04-16 RX ADMIN — SODIUM CHLORIDE 1000 ML: 9 INJECTION, SOLUTION INTRAVENOUS at 17:35

## 2024-04-16 RX ADMIN — SODIUM CHLORIDE: 9 INJECTION, SOLUTION INTRAVENOUS at 22:11

## 2024-04-16 RX ADMIN — ATORVASTATIN CALCIUM 80 MG: 40 TABLET, FILM COATED ORAL at 22:13

## 2024-04-16 RX ADMIN — CEFDINIR 300 MG: 300 CAPSULE ORAL at 22:13

## 2024-04-16 RX ADMIN — LEVETIRACETAM 500 MG: 500 INJECTION, SOLUTION INTRAVENOUS at 22:15

## 2024-04-16 RX ADMIN — INSULIN HUMAN 3 UNITS: 100 INJECTION, SOLUTION PARENTERAL at 17:46

## 2024-04-16 RX ADMIN — GUAIFENESIN 1200 MG: 600 TABLET ORAL at 22:13

## 2024-04-16 ASSESSMENT — ENCOUNTER SYMPTOMS
RHINORRHEA: 0
ABDOMINAL PAIN: 0
VOMITING: 0
DIARRHEA: 0
COUGH: 0
EYE DISCHARGE: 0
NAUSEA: 0
BACK PAIN: 0
FACIAL SWELLING: 0
SHORTNESS OF BREATH: 0
COLOR CHANGE: 0
EYE REDNESS: 0

## 2024-04-16 ASSESSMENT — PAIN SCALES - GENERAL: PAINLEVEL_OUTOF10: 0

## 2024-04-16 ASSESSMENT — LIFESTYLE VARIABLES
HOW OFTEN DO YOU HAVE A DRINK CONTAINING ALCOHOL: NEVER
HOW MANY STANDARD DRINKS CONTAINING ALCOHOL DO YOU HAVE ON A TYPICAL DAY: PATIENT DOES NOT DRINK

## 2024-04-16 ASSESSMENT — PAIN - FUNCTIONAL ASSESSMENT: PAIN_FUNCTIONAL_ASSESSMENT: NONE - DENIES PAIN

## 2024-04-16 NOTE — ED PROVIDER NOTES
Emergency Department Provider Note       PCP: Elver Trevino MD   Age: 84 y.o.   Sex: male     DISPOSITION       No diagnosis found.    Medical Decision Making     Few days of mild altered mental status and hyperglycemia, CT scan of the head reveals right frontal area of hypoattenuation rate of cytotoxic edema concerning for possible stroke.  Also invokes thoughts of possible underlying tumor.  Will admit to the hospitalist for MRI and further workup,     1 or more acute illnesses that pose a threat to life or bodily function.   Prescription drug management performed.  Shared medical decision making was utilized in creating the patients health plan today.    I independently ordered and reviewed each unique test.  I reviewed external records: ED visit note from an outside group.  I reviewed external records: provider visit note from PCP.  I reviewed external records: provider visit note from outside specialist.  I reviewed external records: previous lab results from outside ED.  I reviewed external records: previous imaging study including radiologist interpretation.   The patients assessment required an independent historian: Daughter at bedside..  The reason they were needed is important historical information not provided by the patient.  I independently interpreted the cardiac monitor rhythm strip normal sinus rhythm without ectopy.  I interpreted the X-rays chest x-ray negative for acute infiltrates or effusions.  I interpreted the CT Scan CT scan with frontal lobe edema concerning for CVA versus tumor.  My Independent EKG Interpretation: no acute changes, atrial fibrillation, and non-specific EKG      ST Segments:Normal ST segments - NO STEMI   Rate: 64  The patient was admitted and I have discussed patient management with the admitting provider.          History     84-year-old gentleman history of diabetes and atrial fibrillation presents to the ER for concerns related to sugar and mental  evening.    CLOTRIMAZOLE-BETAMETHASONE (LOTRISONE) 1-0.05 % CREAM    Apply topically 2 times daily    DOCUSATE (COLACE, DULCOLAX) 100 MG CAPS    Take 100 mg by mouth 2 times daily    GANCICLOVIR (ZIRGAN) 0.15 % GEL OPHTHALMIC GEL    Apply 1 drop to eye every evening    GLIPIZIDE (GLUCOTROL) 5 MG TABLET    Take 1 tablet by mouth 2 times daily (before meals)    HYDROCHLOROTHIAZIDE 12.5 MG CAPSULE    Take 1 capsule by mouth daily    LOSARTAN (COZAAR) 100 MG TABLET    Take 1 tablet by mouth daily    NITROGLYCERIN (NITROSTAT) 0.4 MG SL TABLET    Place 1 tablet under the tongue    POLYETHYLENE GLYCOL (GLYCOLAX) 17 GM/SCOOP POWDER    Take 0.4 g/kg by mouth daily    PRAVASTATIN (PRAVACHOL) 40 MG TABLET    Take 0.5 tablets by mouth Every other day    RIVAROXABAN (XARELTO) 15 MG TABS TABLET    Take 1 tablet by mouth daily (with breakfast)        Results for orders placed or performed during the hospital encounter of 04/16/24   POCT Glucose   Result Value Ref Range    POC Glucose 353 (H) 65 - 100 mg/dL    Performed by: SummerMargarita          No orders to display                No results for input(s): \"COVID19\" in the last 72 hours.    Voice dictation software was used during the making of this note.  This software is not perfect and grammatical and other typographical errors may be present.  This note has not been completely proofread for errors.        Alfredo Izaguirre MD  04/16/24 2050

## 2024-04-16 NOTE — ED TRIAGE NOTES
Pt arrives to ER with daughter (POA) from Urgent Care. Patient went to Urgent Care this morning after checking blood sugar at home and it was 330. Urgent Care was concerned because blood sugar continued to increased despite patient taking PO diabetes medications. Blood sugar up to 441 when leaving Urgent Care.  Per family, patient also reports new onset weakness and confusion that started yesterday afternoon.  Denies complaints with urination, but concerned for possible UTI.      in triage.    Patient has history of CKD and a-fib. Afib, rate controlled on EKG in triage.

## 2024-04-17 ENCOUNTER — APPOINTMENT (OUTPATIENT)
Dept: CT IMAGING | Age: 84
End: 2024-04-17
Payer: MEDICARE

## 2024-04-17 ENCOUNTER — TELEPHONE (OUTPATIENT)
Age: 84
End: 2024-04-17

## 2024-04-17 PROBLEM — G40.909 EPILEPSY (HCC): Status: ACTIVE | Noted: 2024-04-17

## 2024-04-17 LAB
ALBUMIN SERPL-MCNC: 2.4 G/DL (ref 3.2–4.6)
ALBUMIN/GLOB SERPL: 0.7 (ref 0.4–1.6)
ALP SERPL-CCNC: 380 U/L (ref 50–136)
ALT SERPL-CCNC: 80 U/L (ref 12–65)
ANION GAP SERPL CALC-SCNC: 3 MMOL/L (ref 2–11)
AST SERPL-CCNC: 59 U/L (ref 15–37)
BILIRUB DIRECT SERPL-MCNC: 0.6 MG/DL
BILIRUB SERPL-MCNC: 2 MG/DL (ref 0.2–1.1)
BUN SERPL-MCNC: 25 MG/DL (ref 8–23)
CALCIUM SERPL-MCNC: 8.2 MG/DL (ref 8.3–10.4)
CHLORIDE SERPL-SCNC: 105 MMOL/L (ref 103–113)
CHOLEST SERPL-MCNC: 150 MG/DL
CO2 SERPL-SCNC: 26 MMOL/L (ref 21–32)
CREAT SERPL-MCNC: 1.4 MG/DL (ref 0.8–1.5)
ERYTHROCYTE [DISTWIDTH] IN BLOOD BY AUTOMATED COUNT: 12.6 % (ref 11.9–14.6)
EST. AVERAGE GLUCOSE BLD GHB EST-MCNC: 220 MG/DL
GLOBULIN SER CALC-MCNC: 3.5 G/DL (ref 2.8–4.5)
GLUCOSE BLD STRIP.AUTO-MCNC: 128 MG/DL (ref 65–100)
GLUCOSE BLD STRIP.AUTO-MCNC: 218 MG/DL (ref 65–100)
GLUCOSE BLD STRIP.AUTO-MCNC: 224 MG/DL (ref 65–100)
GLUCOSE BLD STRIP.AUTO-MCNC: 320 MG/DL (ref 65–100)
GLUCOSE SERPL-MCNC: 236 MG/DL (ref 65–100)
HBA1C MFR BLD: 9.3 % (ref 4.8–5.6)
HCT VFR BLD AUTO: 42.3 % (ref 41.1–50.3)
HDLC SERPL-MCNC: 35 MG/DL (ref 40–60)
HDLC SERPL: 4.3
HGB BLD-MCNC: 13.7 G/DL (ref 13.6–17.2)
LDLC SERPL CALC-MCNC: 87 MG/DL
MCH RBC QN AUTO: 31.7 PG (ref 26.1–32.9)
MCHC RBC AUTO-ENTMCNC: 32.4 G/DL (ref 31.4–35)
MCV RBC AUTO: 97.9 FL (ref 82–102)
NRBC # BLD: 0 K/UL (ref 0–0.2)
PLATELET # BLD AUTO: 170 K/UL (ref 150–450)
PMV BLD AUTO: 12.4 FL (ref 9.4–12.3)
POTASSIUM SERPL-SCNC: 4.6 MMOL/L (ref 3.5–5.1)
PROT SERPL-MCNC: 5.9 G/DL (ref 6.3–8.2)
RBC # BLD AUTO: 4.32 M/UL (ref 4.23–5.6)
SERVICE CMNT-IMP: ABNORMAL
SODIUM SERPL-SCNC: 134 MMOL/L (ref 136–146)
TRIGL SERPL-MCNC: 140 MG/DL (ref 35–150)
VLDLC SERPL CALC-MCNC: 28 MG/DL (ref 6–23)
WBC # BLD AUTO: 10.4 K/UL (ref 4.3–11.1)

## 2024-04-17 PROCEDURE — 85027 COMPLETE CBC AUTOMATED: CPT

## 2024-04-17 PROCEDURE — 80076 HEPATIC FUNCTION PANEL: CPT

## 2024-04-17 PROCEDURE — 99222 1ST HOSP IP/OBS MODERATE 55: CPT | Performed by: STUDENT IN AN ORGANIZED HEALTH CARE EDUCATION/TRAINING PROGRAM

## 2024-04-17 PROCEDURE — 6360000002 HC RX W HCPCS: Performed by: FAMILY MEDICINE

## 2024-04-17 PROCEDURE — 6360000004 HC RX CONTRAST MEDICATION: Performed by: STUDENT IN AN ORGANIZED HEALTH CARE EDUCATION/TRAINING PROGRAM

## 2024-04-17 PROCEDURE — 1100000003 HC PRIVATE W/ TELEMETRY

## 2024-04-17 PROCEDURE — 97530 THERAPEUTIC ACTIVITIES: CPT

## 2024-04-17 PROCEDURE — 97165 OT EVAL LOW COMPLEX 30 MIN: CPT

## 2024-04-17 PROCEDURE — 80048 BASIC METABOLIC PNL TOTAL CA: CPT

## 2024-04-17 PROCEDURE — 6370000000 HC RX 637 (ALT 250 FOR IP): Performed by: FAMILY MEDICINE

## 2024-04-17 PROCEDURE — 97162 PT EVAL MOD COMPLEX 30 MIN: CPT

## 2024-04-17 PROCEDURE — 97535 SELF CARE MNGMENT TRAINING: CPT

## 2024-04-17 PROCEDURE — 2580000003 HC RX 258: Performed by: FAMILY MEDICINE

## 2024-04-17 PROCEDURE — 83036 HEMOGLOBIN GLYCOSYLATED A1C: CPT

## 2024-04-17 PROCEDURE — 70498 CT ANGIOGRAPHY NECK: CPT | Performed by: RADIOLOGY

## 2024-04-17 PROCEDURE — 76937 US GUIDE VASCULAR ACCESS: CPT

## 2024-04-17 PROCEDURE — 70498 CT ANGIOGRAPHY NECK: CPT

## 2024-04-17 PROCEDURE — 97112 NEUROMUSCULAR REEDUCATION: CPT

## 2024-04-17 PROCEDURE — 36415 COLL VENOUS BLD VENIPUNCTURE: CPT

## 2024-04-17 PROCEDURE — 80061 LIPID PANEL: CPT

## 2024-04-17 PROCEDURE — 2500000003 HC RX 250 WO HCPCS: Performed by: FAMILY MEDICINE

## 2024-04-17 PROCEDURE — 70496 CT ANGIOGRAPHY HEAD: CPT | Performed by: RADIOLOGY

## 2024-04-17 PROCEDURE — 82962 GLUCOSE BLOOD TEST: CPT

## 2024-04-17 PROCEDURE — 95816 EEG AWAKE AND DROWSY: CPT

## 2024-04-17 PROCEDURE — 92610 EVALUATE SWALLOWING FUNCTION: CPT

## 2024-04-17 RX ADMIN — GUAIFENESIN 1200 MG: 600 TABLET ORAL at 21:12

## 2024-04-17 RX ADMIN — ATORVASTATIN CALCIUM 80 MG: 40 TABLET, FILM COATED ORAL at 21:12

## 2024-04-17 RX ADMIN — CEFDINIR 300 MG: 300 CAPSULE ORAL at 21:12

## 2024-04-17 RX ADMIN — IOPAMIDOL 50 ML: 755 INJECTION, SOLUTION INTRAVENOUS at 14:21

## 2024-04-17 RX ADMIN — TUBERCULIN PURIFIED PROTEIN DERIVATIVE 5 UNITS: 5 INJECTION, SOLUTION INTRADERMAL at 00:03

## 2024-04-17 RX ADMIN — LEVETIRACETAM 500 MG: 500 INJECTION, SOLUTION INTRAVENOUS at 21:15

## 2024-04-17 RX ADMIN — SODIUM CHLORIDE: 9 INJECTION, SOLUTION INTRAVENOUS at 06:52

## 2024-04-17 RX ADMIN — GUAIFENESIN 1200 MG: 600 TABLET ORAL at 10:38

## 2024-04-17 RX ADMIN — SODIUM CHLORIDE, PRESERVATIVE FREE 10 ML: 5 INJECTION INTRAVENOUS at 21:13

## 2024-04-17 RX ADMIN — INSULIN LISPRO 6 UNITS: 100 INJECTION, SOLUTION INTRAVENOUS; SUBCUTANEOUS at 12:40

## 2024-04-17 RX ADMIN — RIVAROXABAN 15 MG: 15 TABLET, FILM COATED ORAL at 10:39

## 2024-04-17 RX ADMIN — LOSARTAN POTASSIUM 100 MG: 50 TABLET, FILM COATED ORAL at 10:38

## 2024-04-17 RX ADMIN — INSULIN LISPRO 2 UNITS: 100 INJECTION, SOLUTION INTRAVENOUS; SUBCUTANEOUS at 10:37

## 2024-04-17 RX ADMIN — ASPIRIN 81 MG: 81 TABLET, COATED ORAL at 10:36

## 2024-04-17 RX ADMIN — AMLODIPINE BESYLATE 5 MG: 5 TABLET ORAL at 10:39

## 2024-04-17 RX ADMIN — CEFDINIR 300 MG: 300 CAPSULE ORAL at 10:39

## 2024-04-17 RX ADMIN — HYDROCHLOROTHIAZIDE 12.5 MG: 12.5 CAPSULE ORAL at 10:38

## 2024-04-17 RX ADMIN — LEVETIRACETAM 500 MG: 500 INJECTION, SOLUTION INTRAVENOUS at 10:39

## 2024-04-17 RX ADMIN — DOCUSATE SODIUM 100 MG: 100 CAPSULE, LIQUID FILLED ORAL at 21:12

## 2024-04-17 RX ADMIN — SODIUM CHLORIDE, PRESERVATIVE FREE 10 ML: 5 INJECTION INTRAVENOUS at 10:40

## 2024-04-17 NOTE — PROGRESS NOTES
GOALS:  LTG: Patient will complete further cognitive-linguistic evaluation.     SPEECH LANGUAGE PATHOLOGY: DYSPHAGIA Initial Assessment    Acknowledge Order  I  Therapy Time  I   Charges     I  Rehab Caseload Tracker      NAME: Jayy Johnson  : 1940  MRN: [id number]    ADMISSION DATE: 2024  PRIMARY DIAGNOSIS: Abnormal CT of the head    ICD-10: Treatment Diagnosis: R13.12 Dysphagia, Oropharyngeal Phase    RECOMMENDATIONS   Diet:    Regular Consistency  Thin Liquids    Medication: as tolerated   Compensatory Swallowing Strategies:   Slow rate of intake  Small bites/sips  Upright as possible for all oral intake   Therapeutic Intervention:   Patient/family education  Cognitive-linguistic evaluation   Patient continues to require skilled intervention:  Yes. Recommend ongoing speech therapy services during this hospitalization.     Anticipated Discharge Needs: Do not anticipate ongoing speech therapy needs upon discharge.      ASSESSMENT    Patient presents with functional oropharyngeal swallow as can be assessed at bedside. No oral deficits or overt indications of airway compromise observed. Patient/daughter deny difficulty with PO intake.     Patient observed to have mild difficulty following commands from clinician, will follow up for further cognitive-linguistic evaluation as patient with previously noted confusion, multifocal acute strokes per MRI.     Recommend regular solids/thin liquids, meds as tolerated. ST will follow for further cognitive-linguistic evaluation.     GENERAL    Subjective: RN cleared patient to participate. Daughter at bedside, patient agreeable to participate. Uneaten breakfast tray at bedside.     Recent Information/Background: Per neurology note 24 84-year-old male with atrial fibrillation on Xarelto, diabetes, hypertension, CKD 3.  Presented for increased confusion and high glucose levels.  There were also staring spells.  Found to have multifocal acute strokes on MRI,  demonstration or liquid intake during breakfast meal. Patient consumed pureed, soft, and regular solids with no observable oral deficits, complete clearance, and no pharyngeal symptoms. Patient denies difficulty with intake.      Dysphagia Outcome and Severity Scale (ASHWIN)  Score: 7 Description   [x] 7 Normal in all situations   [] 6 Within Functional Limits/ Modified independent   [] 5 Mild Dysphagia: Distant Supervision. May need one diet consistency      restricted.   [] 4 Mild-Moderate Dysphagia: Intermittent supervision/cuing. One-two diet    consistencies restricted.   [] 3 Moderate Dysphagia: Total assistance, supervision, or strategies.       Two or more diet consistencies restricted.   [] 2 Moderate-Severe Dysphagia: Maximum assistance or maximum use     of strategies with partial po intake   [] 1 Severe dysphagia- NPO. Unable to tolerate any po safely     PLAN    Duration/Frequency: Continue to follow patient 2x/week for duration of hospitalization and/or until goals met    Rehabilitation Potential For Stated Goals: Excellent    Interdisciplinary Collaboration: RN/ PCT    Medical Necessity    will follow up for further cognitive-linguistic evaluation as patient with previously noted confusion, multifocal acute strokes per MRI.     Education:   Patient educated on Results of evaluation, Speech therapy recommendations, Role of speech therapy, SLP plan, and Diet recommendations  Education provided to Patient and Family/Caregiver  Education response: Verbalizes understanding    Safety:   Call light within reach  In Bed  Family/visitors at bedside    Therapy Time:  Time In: 0820  Time Out: 0835  Minutes: 15    RISA IGNACIO  4/17/2024 10:48 AM

## 2024-04-17 NOTE — CONSULTS
Neurology Consult Note       History:   84-year-old male with atrial fibrillation on Xarelto, diabetes, hypertension, CKD 3.  Presented for increased confusion and high glucose levels.  There were also staring spells.  Found to have multifocal acute strokes on MRI, most notable in the right distal cortex.  He has been compliant with Xarelto and aspirin.       Exam: Pertinent positives and negatives include:  Fully awake alert and oriented. No language deficits or dysarthria.  No involuntary abnormal saccades or nystagmus.  No facial asymmetry.  He moves all extremities spontaneously and with purpose.  No abnormal involuntary movements. Sensation is intact to light touch.       Imaging and review of data:   MRI brain with multifocal acute strokes, most notable in the distal right frontal cortex.     TTE ~severely dilated left atrium    EEG with sporadic epileptiform discharges over left central parietal region.       Assessment and Plan:   84-year-old male with the above history presents with multifocal acute ischemic strokes in the setting of atrial fibrillation.  Also with reported staring spells, with EEG changes as above.     Plan:  Continue home Xarelto and aspirin.  Consider increase to full Xarelto dose of 20 mg/d, if no issues from cardiology standpoint.     Cardiovascular risk factor control is also essential.     Continue maintenance Keppra 500 mg twice daily, indefinitely.  Seizure precautions discussed, including no driving until seizure-free for at least 6 months or until cleared by a neurologist.     Completion of stroke workup with CT angiogram head and neck, though unlikely to .     He will follow-up with us in neurology discharge clinic.       Jb Erazo, DO  Neurology      Cumulative time spent today was 55 minutes which included chart review, obtaining history (from patient, family, or other providers), review of images, examining the patient, and counseling the patient

## 2024-04-17 NOTE — ACP (ADVANCE CARE PLANNING)
[facility] Hospitalist Service  At the heart of better care     Advance Care Planning   Admit Date:  2024  4:58 PM   Name:  Jayy Johnson   Age:  84 y.o.  Sex:  male  :  1940   MRN:  [id number]   Room:  [place] [address]    Jayy Johnson has capacity to make his own decisions:   Yes    Patient / surrogate decision-maker directed code status:  Full Code--stated he had a living will that said DNR. Now with possible brain mass, he wants to be FULL CODE    Patient or surrogate consented to discussion of the current conditions, workup, management plans, prognosis, and the risk for further deterioration.  Time spent: 17 minutes in direct discussion.      Signed:  NEMESIO SOMERS DO

## 2024-04-17 NOTE — TELEPHONE ENCOUNTER
Pt is at [facility], room 636.  Having mini strokes and called to cancel the WATCHMAN appt.    Wants to let you know he is in the hospital if Dr TORRES can visit. I relayed to reach out to the nurse there.     Heart rate dropping at night and they have monitor on and it is dropping 36 at night. Would like to discuss what is going on as they are very concerned about dropping in HR.

## 2024-04-17 NOTE — THERAPY EVALUATION
ACUTE PHYSICAL THERAPY GOALS:   (Developed with and agreed upon by patient and/or caregiver.)    (1.) Jayy Johnson  will move from supine to sit and sit to supine , scoot up and down, and roll side to side with MODIFIED INDEPENDENCE within 7 treatment day(s).    (2.) Jayy Johnson will transfer from bed to chair and chair to bed with MODIFIED INDEPENDENCE using the least restrictive device within 7 treatment day(s).    (3.) Jayy Johnson will ambulate with SUPERVISION for 500 feet with the least restrictive device within 7 treatment day(s).   (4.) Jayy Johnson will perform standing static and dynamic balance activities x 8 minutes with SUPERVISION to improve safety within 7 treatment day(s).  (5.) Jayy Johnson will ascend and descend 1 stair using 1 hand rail(s) with SUPERVISION to improve functional mobility and safety within 7 treatment day(s).  (6.) Jayy Johnson will perform therapeutic exercises x 15 min for HEP with MODIFIED INDEPENDENCE to improve strength, endurance, and functional mobility within 7 treatment day(s).      PHYSICAL THERAPY Initial Assessment, Daily Note, and PM  (Link to Caseload Tracking: PT Visit Days : 1  Acknowledge Orders  Time In/Out  PT Charge Capture  Rehab Caseload Tracker    Jayy Johnson is a 84 y.o. male   PRIMARY DIAGNOSIS: Abnormal CT of the head  Cerebral edema (HCC) [G93.6]  Disorientation [R41.0]  Hyperglycemia [R73.9]  Abnormal CT of the head [R93.0]       Reason for Referral: Generalized Muscle Weakness (M62.81)  Difficulty in walking, Not elsewhere classified (R26.2)  Inpatient: Payor: MEDICARE / Plan: MEDICARE PART A AND B / Product Type: *No Product type* /     ASSESSMENT:     REHAB RECOMMENDATIONS:   Recommendation to date pending progress:  Setting:  Home Health Therapy    Equipment:    None (has cane and rollator)     ASSESSMENT:  Mr. Johnson is an 84 year old male who presents with elevated blood sugar and AMS. Patient undergoing  One level  Home Access: Stairs to enter without rails  Entrance Stairs - Number of Steps: 1  Bathroom Shower/Tub: Walk-in shower  Home Equipment: Cane, Rollator  Has the patient had two or more falls in the past year or any fall with injury in the past year?: No  Receives Help From: Family  Ambulation Assistance: Independent  Transfer Assistance: Independent    OBJECTIVE:     PAIN: VITALS / O2: PRECAUTION / LINES / DRAINS:   Pre Treatment: 0/10         Post Treatment: 0/10 Vitals        Oxygen      IV    RESTRICTIONS/PRECAUTIONS:  Restrictions/Precautions: Fall Risk                 GROSS EVALUATION:  Intact Impaired (Comments):   AROM [x]     PROM [x]    Strength []  Grossly 4-/5 in BLE    Balance [] Sitting - Static: Good  Sitting - Dynamic: Good, -  Standing - Static: Fair  Standing - Dynamic: Fair, -   Posture [] Forward Head  Rounded Shoulders   Sensation []     Coordination []      Tone []     Edema []    Activity Tolerance []  Limited by weakness     []      COGNITION/  PERCEPTION: Intact Impaired (Comments):   Orientation [x]     Vision [x]     Hearing [x]     Cognition  []       MOBILITY: I Mod I S SBA CGA Min Mod Max Total  NT x2 Comments:   Bed Mobility    Rolling [] [] [] [] [x] [] [] [] [] [] []    Supine to Sit [] [] [] [] [x] [x] [] [] [] [] []    Scooting [] [] [] [] [x] [] [] [] [] [] []    Sit to Supine [] [] [] [] [] [] [] [] [] [] []    Transfers    Sit to Stand [] [] [] [] [x] [] [] [] [] [] []    Bed to Chair [] [] [] [] [x] [] [] [] [] [] []    Stand to Sit [] [] [] [] [x] [] [] [] [] [] []     [] [] [] [] [] [] [] [] [] [] []    I=Independent, Mod I=Modified Independent, S=Supervision, SBA=Standby Assistance, CGA=Contact Guard Assistance,   Min=Minimal Assistance, Mod=Moderate Assistance, Max=Maximal Assistance, Total=Total Assistance, NT=Not Tested    GAIT: I Mod I S SBA CGA Min Mod Max Total  NT x2 Comments:   Level of Assistance [] [] [] [] [x] [] [] [] [] [] []    Distance   250 feet

## 2024-04-17 NOTE — PROCEDURES
Routine EEG Report    Reason for EEG: Staring spell    Technical Summary: This EEG was performed with a 32-channel digital EEG machine with electrodes placed according to the international 10-20 system of placement.            Background:   EEG obtained in the N2 sleep state.  The background is symmetric, low voltage.  There are sleep spindles and K complexes seen throughout the recording.  There are infrequent (1-2) sporadic epileptiform discharges seen maximally over P3, superimposed on intermittent focal slowing in the left central parietal region.  No seizures.     Hyperventilation: Hyperventilation was not performed due to medical condition    Photic Stimulation: Photic stimulation was not performed due to medical condition    Sleep: Stage N2 with sleep spindles and K complexes    Impression:   This is an abnormal EEG with evidence of occasional sporadic epileptiform discharges seen maximally over the left central parietal region.  No seizures were seen.       Jb Erazo, DO  Neurology

## 2024-04-17 NOTE — PROGRESS NOTES
Pt resting, denies needs at this time.  Family at bedside.  EEG completed today.  CT completed today.  NIH performed as ordered.  Hourly rounds completed.  Bed locked and lowered into lowest position.  Call light and personal items within reach.  Report given to night nurse.

## 2024-04-17 NOTE — PROGRESS NOTES
TRANSFER - IN REPORT:    Verbal report received from GRAEME Lopez on Jayy Johnson  being received from ED for routine progression of patient care      Report consisted of patient's Situation, Background, Assessment and   Recommendations(SBAR).     Information from the following report(s) ED Encounter Summary and ED SBAR was reviewed with the receiving nurse.    Opportunity for questions and clarification was provided.      Assessment completed upon patient's arrival to unit and care assumed.

## 2024-04-17 NOTE — ED NOTES
TRANSFER - OUT REPORT:    Verbal report given to GRAEME Chilel on Jayy Johnson  being transferred to [facility] for routine progression of patient care       Report consisted of patient's Situation, Background, Assessment and   Recommendations(SBAR).     Information from the following report(s) ED SBAR was reviewed with the receiving nurse.    Lines:   Peripheral IV 04/16/24 Distal;Left Forearm (Active)   Site Assessment Clean, dry & intact 04/16/24 1726   Line Status Blood return noted;Flushed 04/16/24 1726   Phlebitis Assessment No symptoms 04/16/24 1726   Infiltration Assessment 0 04/16/24 1726   Alcohol Cap Used No 04/16/24 1726   Dressing Status New dressing applied 04/16/24 1726   Dressing Type Transparent 04/16/24 1726   Dressing Intervention New 04/16/24 1726        Opportunity for questions and clarification was provided.      Patient transported with:  Registered Nurse      Kelsie Gonzalez RN  04/16/24 0960

## 2024-04-17 NOTE — PROGRESS NOTES
Pt resting in bed at present time without complaints. Daughter at bedside. MD notified for sinus adriana, no new order given. Hourly rounds completed, all needs met this shift. Bed locked and low, call light within reach. Will give report to oncoming day shift nurse.

## 2024-04-17 NOTE — DISCHARGE INSTRUCTIONS
Stroke: After Your Visit    Your Care Instructions    You have had a stroke.  Risk factors for stroke include being overweight, smoking, and sedentary lifestyle. This means that the blood flow to a part of your brain was blocked for some time, which damages the nerve cells in that part of the brain. The part of your body controlled by that part of your brain may not function properly now.    The brain is an amazing organ that can heal itself to some degree. The stroke you had damaged part of your brain, but other parts of your brain may take over in some way for the damaged areas. You have already started this process.    Going home may be hard for you and your family. The more you can try to do for yourself, the better. Remember to take each day one at a time.    Follow-up care is a key part of your treatment and safety. Be sure to make and go to all appointments, and call your doctor if you are having problems. It's also a good idea to know your test results and keep a list of the medicines you take.    How can you care for yourself at home?   Enter a stroke rehabilitation (rehab) program, if your doctor recommends it. Physical, speech, and occupational therapies can help you manage bathing, dressing, eating, and other basics of daily living.  Eat a heart-healthy diet that is low in cholesterol, saturated fat, and salt. Eat lots of fresh fruits and vegetables and foods high in fiber.  Increase your activities slowly. Take short rest breaks when you get tired. Gradually increase the amount you walk. Start out by walking a little more than you did the day before.  Do not drive until your doctor says it is okay.  It is normal to feel sad or depressed after a stroke. If the “blues” last, talk to your doctor.  If you are having problems with urine leakage, go to the bathroom at regular times, including when you first wake up and at bedtime. Also, limit fluids after dinner.  If you are constipated, drink plenty of  you are not having your period, or heavy period bleeding.  You have new symptoms that may be related to your stroke, such as falls or trouble swallowing.    Watch closely for changes in your health, and be sure to contact your doctor if you have any problems.    Where can you learn more?    Go to http://www.[employer].net/[facility]    Enter C294  in the search box to learn more about \"Stroke: After Your Visit\".    © 4847-4687 [employer]. Care instructions adapted under license by [employer] (which disclaims liability or warranty for this information). This care instruction is for use with your licensed healthcare professional. If you have questions about a medical condition or this instruction, always ask your healthcare professional. [employer] disclaims any warranty or liability for your use of this information.

## 2024-04-17 NOTE — PLAN OF CARE
Problem: Discharge Planning  Goal: Discharge to home or other facility with appropriate resources  4/17/2024 1738 by Cathy Mendes, RN  Outcome: Progressing  4/17/2024 0345 by Nedra Paez RN  Outcome: Progressing     Problem: Safety - Adult  Goal: Free from fall injury  4/17/2024 1738 by Cathy Mendes, RN  Outcome: Progressing  4/17/2024 0345 by Nedra Paez RN  Outcome: Progressing

## 2024-04-17 NOTE — PROGRESS NOTES
US Guided PIV access-    Ultrasound was used to find the vein which was compressible and without any ultrasound features of an artery or nerve bundle. Skin was cleaned and disinfected prior to IV puncture.  Under real-time ultrasound guidance peripheral access was obtained in the right AC using 20 G 1.75\" Peripheral IV catheter after 1 attempt(s). Blood return was present and IV flushed without difficulty with no clinical signs of infiltration. IV dressing applied and no immediate complications noted. Patient tolerated the procedure well.

## 2024-04-17 NOTE — TELEPHONE ENCOUNTER
I spoke w/Arielle and she said the neurologist is wanting to increase Xarelto to 20mg qday.This was concerning.I advised she trust the neurologist recommendations.  He has also been having HR drop to 36 while he sleeps at night.Overnight sleep study being ordered.The hospitalist and Neurologist both agreed that the watchman is not a good option at this time.Pt.has cancelled upcoming watchman consult appt.  Just wanted  aware of what is going on.      I did advise they request cardiology consult if any cardiac concerns.

## 2024-04-17 NOTE — PROGRESS NOTES
ACUTE OCCUPATIONAL THERAPY GOALS:   (Developed with and agreed upon by patient and/or caregiver.)  1. Pt will complete LB ADL supervision with AE as needed.   2. Pt will complete toileting supervision with AE as needed.   3. Pt will complete UB ADL set up only.  4. Pt will tolerate 25 minutes of OT treatment requiring 1-2 breaks as needed.   5. Pt will complete grooming tasks while standing at sink supervision.  6. Pt will complete functional mobility supervision no AD.  7. Pt will tolerate BUE exercises to increase strength for safe, functional transfers and/or functional mobility, and ADL participation.     Timeframe: 7 days      OCCUPATIONAL THERAPY Initial Assessment, Daily Note, and PM       OT Visit Days: 1  Acknowledge Orders  Time  OT Charge Capture  Rehab Caseload Tracker      Jayy Johnson is a 84 y.o. male   PRIMARY DIAGNOSIS: Abnormal CT of the head  Cerebral edema (HCC) [G93.6]  Disorientation [R41.0]  Hyperglycemia [R73.9]  Abnormal CT of the head [R93.0]       Reason for Referral: Generalized Muscle Weakness (M62.81)  Inpatient: Payor: MEDICARE / Plan: MEDICARE PART A AND B / Product Type: *No Product type* /     ASSESSMENT:     REHAB RECOMMENDATIONS:   Recommendation to date pending progress:  Setting:  Home Health Therapy    Equipment:    None     ASSESSMENT:  Mr. Johnson is a 84 y M with a hx of afib, CAD, seizures. Pt was admitted for AMS. Pt was received supine in bed alert and oriented x4. Pt hard of hearing. Pt required assistance for functional mobility and ADLs today due to slight unsteadiness, recent confusion. Pt has deficits in strength, balance, activity tolerance, and performing ADLs. Pt requires continued skilled OT services due to performing functionally below baseline.       [facility] AM-PAC™ “6 Clicks” Daily Activity Inpatient Short Form:    AM-PAC Daily Activity - Inpatient   How much help is needed for putting on and taking off regular lower body clothing?: A  Little  How much help is needed for bathing (which includes washing, rinsing, drying)?: A Little  How much help is needed for toileting (which includes using toilet, bedpan, or urinal)?: A Little  How much help is needed for putting on and taking off regular upper body clothing?: None  How much help is needed for taking care of personal grooming?: None  How much help for eating meals?: None  AM-PAC Inpatient Daily Activity Raw Score: 21  AM-PAC Inpatient ADL T-Scale Score : 44.27  ADL Inpatient CMS 0-100% Score: 32.79  ADL Inpatient CMS G-Code Modifier : CJ           SUBJECTIVE:     Mr. Johnson states, \"I usually can get my underwear on by myself but these grippy socks are making it hard.\"     Social/Functional Lives With: Spouse  Type of Home: House  Home Layout: One level  Home Access: Stairs to enter without rails  Entrance Stairs - Number of Steps: 1  Bathroom Shower/Tub: Walk-in shower  Home Equipment: Cane, Rollator  Has the patient had two or more falls in the past year or any fall with injury in the past year?: No  Receives Help From: Family  Ambulation Assistance: Independent  Transfer Assistance: Independent    OBJECTIVE:     LINES / DRAINS / AIRWAY: IV and Telemetry     RESTRICTIONS/PRECAUTIONS:  Restrictions/Precautions: Fall Risk    PAIN: VITALS / O2:   Pre Treatment:   Pain Assessment: None - Denies Pain      Post Treatment: none       Vitals          Oxygen            GROSS EVALUATION: INTACT IMPAIRED   (See Comments)   UE AROM [x] []   UE PROM [] []   Strength []  Slightly weaker than BL but WFL     Posture / Balance [] Sitting - Static: Good  Sitting - Dynamic: Good, -, Fair, +  Standing - Static:  (F/F+)  Standing - Dynamic: Fair   Sensation [x]     Coordination []WFL       Tone [x]       Edema [x]    Activity Tolerance [] Patient Tolerated treatment well     Hand Dominance R [x] L []      COGNITION/  PERCEPTION: INTACT IMPAIRED   (See Comments)   Orientation [x]     Vision []  glasses   Hearing []  first.    PROBLEM LIST:   (Skilled intervention is medically necessary to address:)  Decreased ADL/Functional Activities  Decreased Activity Tolerance  Decreased Balance  Decreased Gait Ability  Decreased Strength  Decreased Transfer Abilities   INTERVENTIONS PLANNED:  (Benefits and precautions of occupational therapy have been discussed with the patient.)  Self Care Training  Therapeutic Activity  Therapeutic Exercise/HEP  Neuromuscular Re-education  Gait Training  Manual Therapy  Education         TREATMENT:     EVALUATION: LOW COMPLEXITY: (Untimed Charge)  The initial evaluation charge encompasses clinical chart review, objective assessment, interpretation of assessment, and skilled monitoring of the patient's response to treatment in order to develop a plan of care.     TREATMENT:   Co-Treatment PT/OT necessary due to patient's decreased overall endurance/tolerance levels, as well as need for high level skilled assistance to complete functional transfers/mobility and functional tasks  Neuromuscular Re-education (15 Minutes): Patient participated in neuromuscular re-education including functional reaching, weight shifting, postural training, midline training, standing tolerance activity , and sitting balance activity   with minimal assistance, tactile cues, education, and adaptive equipment to improve sitting balance, standing balance, static balance, and dynamic balance in order to prepare for functional task, prepare for seated ADLs, prepare for standing ADLs, prepare for functional transfer, prepare for discharge home , and prepare for self care..   Self Care (15 minutes): Patient participated in toileting and grooming ADLs in supported sitting and standing with minimal verbal, manual, and tactile cueing to increase independence, decrease assistance required, and increase activity tolerance. Patient also participated in functional mobility and functional transfer training to increase independence, decrease

## 2024-04-17 NOTE — H&P
Hospitalist History and Physical   Admit Date:  2024  4:58 PM   Name:  Jayy Johnson   Age:  84 y.o.  Sex:  male  :  1940   MRN:  [id number]   Room:  [place] [address]    Presenting/Chief Complaint: Hyperglycemia and Atrial Fibrillation     Reason(s) for Admission: No admission diagnoses are documented for this encounter.     History of Present Illness:   Jayy Johnson is a 84 y.o. male who presented to the ED for cc AMS and uncontrolled glucose levels. Confusion started yesterday, but while I am in the room there is no confusion. When speaking to the daughter, they noticed over the weekend that her father would have episodes of staring and not speaking. No hx of seizures.     Hx of CKD 3, HTN, a fib on Xarelto, HLD, DM type II     CT head - 1.  Cytotoxic edema with hypoattenuation is seen along the lateral  aspect of the right frontal lobe above the level of the sylvian  fissure anteriorly indicating the possibility of acute or subacute  infarction.  No associated hemorrhage.  2.  Age-related mild-to-moderate volume loss and chronic  periventricular white matter hypodensities are noted.  No midline  shift.  No hemorrhage or additional areas of acute infarction.  3.  Consider MRI follow-up for further evaluation if clinically  indicated.  Assessment & Plan:     Active Problems:    Right frontal lobe edema with possible infarction - CVA work up. Order decadron 10mg once and then 4mg q6 hr tonight. No midline shift, so will hold off on ICU admit. MRI brain with and without contrast is currently being performed. Will need to order CT chest abdomen pelvis with contrast in about 24 hours since he has already received contrast today if masses are noted on MRI brain. Will give IV fluids. Since I am unsure if there may be micro-hemorrhaging going on, holding his xarelto and ASA tonight.Possibly could be having seizures. Ordering Keppra BID and EEG in AM. Consult neurology for further recs.     Hyperglycemia  chronic periventricular white matter hypodensities are noted.  No midline shift.  No hemorrhage or additional areas of acute infarction. 3.  Consider MRI follow-up for further evaluation if clinically indicated. Automatic exposure control was used as a dose lowering technique. Radiation Dose: CTDI is 49.13 mGy. DLP is 1060.77 mGy-cm. Report signed on 04/16/2024 (19:08 Eastern Time) Signed by: Anthony Dickson M.D. Reading Location: 244    XR CHEST (2 VW)    Result Date: 4/16/2024  AP AND LATERAL VIEWS OF THE CHEST HISTORY: Altered mental status and hyperglycemia COMPARISON: Chest radiograph dated 6/26/2015 FINDINGS: Hardware/Lines: Coronary artery stent and partially visualized right shoulder arthroplasty The cardiac silhouette is normal in size, magnified by projection. Thoracic aorta with atherosclerotic calcifications. Lungs/Pleura: Right middle lobe hazy opacity, may reflect developing pneumonia versus atelectasis. No significant pleural effusion appreciated. No pneumothorax identified.     Right middle lobe opacity, may reflect pneumonia in the appropriate clinical setting. Recommend follow-up chest radiograph in 4 weeks to ensure resolution.         Signed:  NEMESIO SOMERS DO    Part of this note may have been written by using a voice dictation software.  The note has been proof read but may still contain some grammatical/other typographical errors.

## 2024-04-17 NOTE — CARE COORDINATION
04/17/24 1106   Service Assessment   Patient Orientation Alert and Oriented   Cognition Alert   History Provided By Patient   Primary Caregiver Self   Accompanied By/Relationship daughter   Support Systems Spouse/Significant Other;Family Members   Patient's Healthcare Decision Maker is: Legal Next of Kin   PCP Verified by CM Yes   Last Visit to PCP Within last 6 months   Prior Functional Level Independent in ADLs/IADLs   Current Functional Level Independent in ADLs/IADLs   Can patient return to prior living arrangement Unknown at present   Ability to make needs known: Good   Family able to assist with home care needs: Yes   Would you like for me to discuss the discharge plan with any other family members/significant others, and if so, who? Yes  (spouse and children)   Financial Resources Medicare   Community Resources None   CM/SW Referral Other (see comment)  (discharge planning)   Social/Functional History   Lives With Spouse   Type of Home House   Condition of Participation: Discharge Planning   The Plan for Transition of Care is related to the following treatment goals: return home   The Patient and/or Patient Representative was provided with a Choice of Provider? Patient   The Patient and/Or Patient Representative agree with the Discharge Plan? Yes   Freedom of Choice list was provided with basic dialogue that supports the patient's individualized plan of care/goals, treatment preferences, and shares the quality data associated with the providers?  Yes     Assessment completed with patient in room, daughter present for assessment. Patient lives with spouse. He uses a cane as needed for ambulation. He reports being IND with all ADLs. His wife assist with meals, house cleaning and medications. Patient is active at baseline, and enjoys doing yard work. Demographics and PCP confirmed. CM following for discharge needs.    Galen GRAHAM, ACM  [place]

## 2024-04-18 ENCOUNTER — APPOINTMENT (OUTPATIENT)
Dept: ULTRASOUND IMAGING | Age: 84
End: 2024-04-18
Payer: MEDICARE

## 2024-04-18 ENCOUNTER — HOME HEALTH ADMISSION (OUTPATIENT)
Dept: HOME HEALTH SERVICES | Facility: HOME HEALTH | Age: 84
End: 2024-04-18

## 2024-04-18 VITALS
HEIGHT: 65 IN | HEART RATE: 76 BPM | BODY MASS INDEX: 34.66 KG/M2 | TEMPERATURE: 97.9 F | SYSTOLIC BLOOD PRESSURE: 170 MMHG | DIASTOLIC BLOOD PRESSURE: 71 MMHG | RESPIRATION RATE: 18 BRPM | WEIGHT: 208 LBS | OXYGEN SATURATION: 97 %

## 2024-04-18 LAB
ANION GAP SERPL CALC-SCNC: 5 MMOL/L (ref 2–11)
BUN SERPL-MCNC: 26 MG/DL (ref 8–23)
CALCIUM SERPL-MCNC: 9.1 MG/DL (ref 8.3–10.4)
CHLORIDE SERPL-SCNC: 103 MMOL/L (ref 103–113)
CO2 SERPL-SCNC: 26 MMOL/L (ref 21–32)
CREAT SERPL-MCNC: 1.4 MG/DL (ref 0.8–1.5)
ERYTHROCYTE [DISTWIDTH] IN BLOOD BY AUTOMATED COUNT: 12.7 % (ref 11.9–14.6)
GLUCOSE BLD STRIP.AUTO-MCNC: 191 MG/DL (ref 65–100)
GLUCOSE BLD STRIP.AUTO-MCNC: 215 MG/DL (ref 65–100)
GLUCOSE SERPL-MCNC: 213 MG/DL (ref 65–100)
HCT VFR BLD AUTO: 48.1 % (ref 41.1–50.3)
HGB BLD-MCNC: 15.8 G/DL (ref 13.6–17.2)
MCH RBC QN AUTO: 31.9 PG (ref 26.1–32.9)
MCHC RBC AUTO-ENTMCNC: 32.8 G/DL (ref 31.4–35)
MCV RBC AUTO: 97.2 FL (ref 82–102)
MM INDURATION, POC: 0 MM (ref 0–5)
NRBC # BLD: 0 K/UL (ref 0–0.2)
PLATELET # BLD AUTO: 178 K/UL (ref 150–450)
PMV BLD AUTO: 11.8 FL (ref 9.4–12.3)
POTASSIUM SERPL-SCNC: 4.6 MMOL/L (ref 3.5–5.1)
PPD, POC: NEGATIVE
RBC # BLD AUTO: 4.95 M/UL (ref 4.23–5.6)
SERVICE CMNT-IMP: ABNORMAL
SERVICE CMNT-IMP: ABNORMAL
SODIUM SERPL-SCNC: 134 MMOL/L (ref 136–146)
WBC # BLD AUTO: 10.4 K/UL (ref 4.3–11.1)

## 2024-04-18 PROCEDURE — 6370000000 HC RX 637 (ALT 250 FOR IP): Performed by: INTERNAL MEDICINE

## 2024-04-18 PROCEDURE — 36415 COLL VENOUS BLD VENIPUNCTURE: CPT

## 2024-04-18 PROCEDURE — 6370000000 HC RX 637 (ALT 250 FOR IP): Performed by: FAMILY MEDICINE

## 2024-04-18 PROCEDURE — 85027 COMPLETE CBC AUTOMATED: CPT

## 2024-04-18 PROCEDURE — 82962 GLUCOSE BLOOD TEST: CPT

## 2024-04-18 PROCEDURE — 97530 THERAPEUTIC ACTIVITIES: CPT

## 2024-04-18 PROCEDURE — 6360000002 HC RX W HCPCS: Performed by: FAMILY MEDICINE

## 2024-04-18 PROCEDURE — 76700 US EXAM ABDOM COMPLETE: CPT

## 2024-04-18 PROCEDURE — 80048 BASIC METABOLIC PNL TOTAL CA: CPT

## 2024-04-18 RX ORDER — AMOXICILLIN AND CLAVULANATE POTASSIUM 875; 125 MG/1; MG/1
1 TABLET, FILM COATED ORAL 2 TIMES DAILY
Qty: 10 TABLET | Refills: 0 | Status: ON HOLD | OUTPATIENT
Start: 2024-04-18 | End: 2024-04-26 | Stop reason: HOSPADM

## 2024-04-18 RX ORDER — ASPIRIN 81 MG/1
81 TABLET ORAL DAILY
Qty: 30 TABLET | Refills: 3
Start: 2024-04-18

## 2024-04-18 RX ORDER — LEVETIRACETAM 500 MG/1
500 TABLET ORAL 2 TIMES DAILY
Qty: 60 TABLET | Refills: 0 | Status: SHIPPED | OUTPATIENT
Start: 2024-04-18

## 2024-04-18 RX ORDER — ATORVASTATIN CALCIUM 80 MG/1
80 TABLET, FILM COATED ORAL NIGHTLY
Qty: 30 TABLET | Refills: 0 | Status: SHIPPED | OUTPATIENT
Start: 2024-04-18

## 2024-04-18 RX ADMIN — AMLODIPINE BESYLATE 5 MG: 5 TABLET ORAL at 08:31

## 2024-04-18 RX ADMIN — LOSARTAN POTASSIUM 100 MG: 50 TABLET, FILM COATED ORAL at 08:31

## 2024-04-18 RX ADMIN — INSULIN LISPRO 2 UNITS: 100 INJECTION, SOLUTION INTRAVENOUS; SUBCUTANEOUS at 08:30

## 2024-04-18 RX ADMIN — DOCUSATE SODIUM 100 MG: 100 CAPSULE, LIQUID FILLED ORAL at 08:31

## 2024-04-18 RX ADMIN — LEVETIRACETAM 500 MG: 500 INJECTION, SOLUTION INTRAVENOUS at 12:14

## 2024-04-18 RX ADMIN — CEFDINIR 300 MG: 300 CAPSULE ORAL at 08:31

## 2024-04-18 RX ADMIN — POLYETHYLENE GLYCOL 3350 34 G: 17 POWDER, FOR SOLUTION ORAL at 08:31

## 2024-04-18 RX ADMIN — RIVAROXABAN 20 MG: 20 TABLET, FILM COATED ORAL at 08:31

## 2024-04-18 RX ADMIN — HYDROCHLOROTHIAZIDE 12.5 MG: 12.5 CAPSULE ORAL at 08:31

## 2024-04-18 RX ADMIN — ASPIRIN 81 MG: 81 TABLET, COATED ORAL at 08:31

## 2024-04-18 RX ADMIN — GUAIFENESIN 1200 MG: 600 TABLET ORAL at 08:31

## 2024-04-18 NOTE — PROGRESS NOTES
ACUTE PHYSICAL THERAPY GOALS:   (Developed with and agreed upon by patient and/or caregiver.)    (1.) Jayy Johnson  will move from supine to sit and sit to supine , scoot up and down, and roll side to side with MODIFIED INDEPENDENCE within 7 treatment day(s).    (2.) Jayy Johnson will transfer from bed to chair and chair to bed with MODIFIED INDEPENDENCE using the least restrictive device within 7 treatment day(s).    (3.) Jayy Johnson will ambulate with SUPERVISION for 500 feet with the least restrictive device within 7 treatment day(s).   (4.) Jayy Johnson will perform standing static and dynamic balance activities x 8 minutes with SUPERVISION to improve safety within 7 treatment day(s).  (5.) Jayy Johnson will ascend and descend 1 stair using 1 hand rail(s) with SUPERVISION to improve functional mobility and safety within 7 treatment day(s).  (6.) Jayy Johnson will perform therapeutic exercises x 15 min for HEP with MODIFIED INDEPENDENCE to improve strength, endurance, and functional mobility within 7 treatment day(s).     PHYSICAL THERAPY: Daily Note PM   (Link to Caseload Tracking: PT Visit Days : 2  Time In/Out PT Charge Capture  Rehab Caseload Tracker  Orders    Jayy Johnson is a 84 y.o. male   PRIMARY DIAGNOSIS: Abnormal CT of the head  Cerebral edema (HCC) [G93.6]  Disorientation [R41.0]  Hyperglycemia [R73.9]  Abnormal CT of the head [R93.0]       Inpatient: Payor: MEDICARE / Plan: MEDICARE PART A AND B / Product Type: *No Product type* /     ASSESSMENT:     REHAB RECOMMENDATIONS:   Recommendation to date pending progress:  Setting:  Home Health Therapy    Equipment:    None (has cane and 2WW)     ASSESSMENT:  Mr. Johnson is supine upon arrival and agreeable to mobility. He transfers from supine to sitting, from slightly elevated bed, with CGA but does require extra time and verbal cueing for best sequencing. He then performs sit to stand from bed to 2WW with CGA/SBA  and ambulates in hallway for about 300 ft with 2WW and demonstrates decreased step length and slightly slowed speed but overall is steady throughout. At end of session he is seated up in chair and positioned with all needs in reach. Overall patient presents with some decreased safety awareness and ongoing balance issues, but participates well throughout session. Discussed with family importance of use of AD at home as well as other safety measures to improve patient mobility and reduce fall risk. Good progress overall. Will continue to follow.      SUBJECTIVE:   Mr. Johnson states, \"Sometimes I can, sometimes I cant\"     Social/Functional Lives With: Spouse  Type of Home: House  Home Layout: One level  Home Access: Stairs to enter without rails  Entrance Stairs - Number of Steps: 1  Bathroom Shower/Tub: Walk-in shower  Home Equipment: Cane, Rollator  Has the patient had two or more falls in the past year or any fall with injury in the past year?: No  Receives Help From: Family  Ambulation Assistance: Independent  Transfer Assistance: Independent  OBJECTIVE:     PAIN: VITALS / O2: PRECAUTION / LINES / DRAINS:   Pre Treatment: 0/10         Post Treatment: 0/10 Vitals        Oxygen    None    RESTRICTIONS/PRECAUTIONS:  Restrictions/Precautions  Restrictions/Precautions: Fall Risk  Restrictions/Precautions: Fall Risk     MOBILITY: I Mod I S SBA CGA Min Mod Max Total  NT x2 Comments:   Bed Mobility    Rolling [] [] [] [] [x] [] [] [] [] [] []    Supine to Sit [] [] [] [] [x] [] [] [] [] [] []    Scooting [] [] [] [] [x] [] [] [] [] [] []    Sit to Supine [] [] [] [] [] [] [] [] [] [] []    Transfers    Sit to Stand [] [] [] [x] [x] [] [] [] [] [] []    Bed to Chair [] [] [] [x] [x] [] [] [] [] [] []    Stand to Sit [] [] [] [x] [x] [] [] [] [] [] []     [] [] [] [] [] [] [] [] [] [] []    I=Independent, Mod I=Modified Independent, S=Supervision, SBA=Standby Assistance, CGA=Contact Guard Assistance,   Min=Minimal

## 2024-04-18 NOTE — DISCHARGE SUMMARY
Hospitalist Discharge Summary   Admit Date:  2024  4:58 PM   DC Note date: 2024  Name:  Jayy Johnson   Age:  84 y.o.  Sex:  male  :  1940   MRN:  [id number]   Room:  [facility]  PCP:  Elver Trevino MD    Presenting Complaint: Hyperglycemia and Atrial Fibrillation     Initial Admission Diagnosis: Cerebral edema (HCC) [G93.6]  Disorientation [R41.0]  Hyperglycemia [R73.9]  Abnormal CT of the head [R93.0]     Problem List for this Hospitalization (present on admission):    Principal Problem:    Abnormal CT of the head  Active Problems:    Obstructed, uropathy    Chronic atrial fibrillation (HCC)    Dyslipidemia    Atherosclerosis of native coronary artery of native heart with stable angina pectoris (HCC)    Essential hypertension, benign    Type 2 diabetes mellitus (HCC)    Epilepsy (HCC)  Resolved Problems:    * No resolved hospital problems. *    Jayy Johnson is a 84 y.o. male who presented to the ED for cc AMS and uncontrolled glucose levels. Confusion started yesterday, but while I am in the room there is no confusion. When speaking to the daughter, they noticed over the weekend that her father would have episodes of staring and not speaking. No hx of seizures.      Hx of CKD 3, HTN, a fib on Xarelto, HLD, DM type II      CT head - 1.  Cytotoxic edema with hypoattenuation is seen along the lateral  aspect of the right frontal lobe above the level of the sylvian  fissure anteriorly indicating the possibility of acute or subacute  infarction.  No associated hemorrhage.  2.  Age-related mild-to-moderate volume loss and chronic  periventricular white matter hypodensities are noted.  No midline  shift.  No hemorrhage or additional areas of acute infarction.  3.  Consider MRI follow-up for further evaluation if clinically  indicated.    Interval History (): patient examined at bedside. No acute overnight events. Feels well overall. No further \"staring spells\". Denies chest pain    Extremities: Warm and dry.   No edema.    Skin:     No rashes.  Normal coloration  Neuro:  CN II-XII grossly intact.  Psych:  Normal mood and affect.    Signed:  NI MORRISON DO    Part of this note may have been written by using a voice dictation software.  The note has been proof read but may still contain some grammatical/other typographical errors.

## 2024-04-18 NOTE — PROGRESS NOTES
Pt resting in bed at present time without complaints.Daughter at bedside. MD notified for abnormal heart rhythm, no order given. Hourly rounds completed, all needs met this shift. Bed locked and low, call light within reach. Will give report to oncoming day shift nurse.

## 2024-04-18 NOTE — ACP (ADVANCE CARE PLANNING)
CM met with pt daughter at bedside, pt is out of room for US, daughter is requesting PT reeval pt for possible STR. CM notified PT and will follow up for recommendations.

## 2024-04-18 NOTE — PROGRESS NOTES
Physician Progress Note      PATIENT:               RUTHIE DUMONT  [facility] #:                  [id number]  :                       1940  ADMIT DATE:       2024 4:58 PM  DISCH DATE:        2024 4:02 PM  RESPONDING  PROVIDER #:        Flip Croft DO          QUERY TEXT:    Patient admitted with CVA, noted to have atrial fibrillation and is maintained   on Xarelto. If possible, please document in progress notes and discharge   summary if you are evaluating and/or treating any of the following:    The medical record reflects the following:  Risk Factors: 84 yr, Htn, Afib, DM,  Clinical Indicators: EKG shows atrial fibrillation Rate: 64 on admission  Treatment: Sctot [email or web address]  Options provided:  -- Secondary hypercoagulable state in a patient with atrial fibrillation  -- Other - I will add my own diagnosis  -- Disagree - Not applicable / Not valid  -- Disagree - Clinically unable to determine / Unknown  -- Refer to Clinical Documentation Reviewer    PROVIDER RESPONSE TEXT:    This patient has secondary hypercoagulable state in a patient with atrial   fibrillation.    Query created by: JIM DELA CRUZ on 2024 11:11 AM      Electronically signed by:  Flip Croft DO 2024 5:47 PM

## 2024-04-18 NOTE — CARE COORDINATION
Pt daughter requested pt be re-evaled by PT for STR, per PT, pt is still h/h level. CM sent referral to [facility] for RN, PT,OT, verified with Kiara. Pt and daughter stated they can afford to stay on Xarelto. Not eligible for coupons. No other needs identified, pt will have transportation, home with wife and family nearby.

## 2024-04-19 ENCOUNTER — APPOINTMENT (OUTPATIENT)
Dept: GENERAL RADIOLOGY | Age: 84
DRG: 064 | End: 2024-04-19
Payer: MEDICARE

## 2024-04-19 ENCOUNTER — APPOINTMENT (OUTPATIENT)
Dept: MRI IMAGING | Age: 84
DRG: 064 | End: 2024-04-19
Payer: MEDICARE

## 2024-04-19 ENCOUNTER — HOSPITAL ENCOUNTER (INPATIENT)
Age: 84
LOS: 6 days | Discharge: HOME | DRG: 064 | End: 2024-04-27
Attending: EMERGENCY MEDICINE | Admitting: INTERNAL MEDICINE
Payer: MEDICARE

## 2024-04-19 ENCOUNTER — APPOINTMENT (OUTPATIENT)
Dept: CT IMAGING | Age: 84
DRG: 064 | End: 2024-04-19
Payer: MEDICARE

## 2024-04-19 DIAGNOSIS — M79.601 RIGHT ARM PAIN: ICD-10-CM

## 2024-04-19 DIAGNOSIS — R73.9 HYPERGLYCEMIA: ICD-10-CM

## 2024-04-19 DIAGNOSIS — R29.898 RIGHT LEG WEAKNESS: Primary | ICD-10-CM

## 2024-04-19 PROBLEM — E86.0 DEHYDRATION: Status: ACTIVE | Noted: 2024-04-19

## 2024-04-19 PROBLEM — E87.1 HYPONATREMIA: Status: ACTIVE | Noted: 2024-04-19

## 2024-04-19 PROBLEM — J18.9 RIGHT LOWER LOBE PNEUMONIA: Status: ACTIVE | Noted: 2024-04-19

## 2024-04-19 PROBLEM — D72.829 LEUKOCYTOSIS: Status: ACTIVE | Noted: 2024-04-19

## 2024-04-19 PROBLEM — Z86.73 HISTORY OF CARDIOEMBOLIC CEREBROVASCULAR ACCIDENT (CVA): Status: ACTIVE | Noted: 2024-04-19

## 2024-04-19 LAB
ANION GAP SERPL CALC-SCNC: 6 MMOL/L (ref 2–11)
APPEARANCE UR: CLEAR
BACTERIA URNS QL MICRO: NEGATIVE /HPF
BILIRUB UR QL: NEGATIVE
BUN SERPL-MCNC: 32 MG/DL (ref 8–23)
CALCIUM SERPL-MCNC: 9.2 MG/DL (ref 8.3–10.4)
CASTS URNS QL MICRO: NORMAL /LPF
CHLORIDE SERPL-SCNC: 98 MMOL/L (ref 103–113)
CO2 SERPL-SCNC: 24 MMOL/L (ref 21–32)
COLOR UR: NORMAL
CREAT SERPL-MCNC: 1.8 MG/DL (ref 0.8–1.5)
EPI CELLS #/AREA URNS HPF: NORMAL /HPF
ERYTHROCYTE [DISTWIDTH] IN BLOOD BY AUTOMATED COUNT: 12.9 % (ref 11.9–14.6)
GLUCOSE BLD STRIP.AUTO-MCNC: 295 MG/DL (ref 65–100)
GLUCOSE BLD STRIP.AUTO-MCNC: 299 MG/DL (ref 65–100)
GLUCOSE BLD STRIP.AUTO-MCNC: 338 MG/DL (ref 65–100)
GLUCOSE SERPL-MCNC: 367 MG/DL (ref 65–100)
GLUCOSE UR STRIP.AUTO-MCNC: 250 MG/DL
HCT VFR BLD AUTO: 47.4 % (ref 41.1–50.3)
HGB BLD-MCNC: 15.7 G/DL (ref 13.6–17.2)
HGB UR QL STRIP: NEGATIVE
KETONES UR QL STRIP.AUTO: NEGATIVE MG/DL
LEUKOCYTE ESTERASE UR QL STRIP.AUTO: NEGATIVE
MAGNESIUM SERPL-MCNC: 2.2 MG/DL (ref 1.8–2.4)
MCH RBC QN AUTO: 31.8 PG (ref 26.1–32.9)
MCHC RBC AUTO-ENTMCNC: 33.1 G/DL (ref 31.4–35)
MCV RBC AUTO: 96.1 FL (ref 82–102)
MUCOUS THREADS URNS QL MICRO: 0 /LPF
NITRITE UR QL STRIP.AUTO: NEGATIVE
NRBC # BLD: 0 K/UL (ref 0–0.2)
PH UR STRIP: 5.5 (ref 5–9)
PLATELET # BLD AUTO: 240 K/UL (ref 150–450)
PMV BLD AUTO: 12.1 FL (ref 9.4–12.3)
POTASSIUM SERPL-SCNC: 4.8 MMOL/L (ref 3.5–5.1)
PROT UR STRIP-MCNC: NEGATIVE MG/DL
RBC # BLD AUTO: 4.93 M/UL (ref 4.23–5.6)
RBC #/AREA URNS HPF: NORMAL /HPF
SERVICE CMNT-IMP: ABNORMAL
SODIUM SERPL-SCNC: 128 MMOL/L (ref 136–146)
SP GR UR REFRACTOMETRY: 1.01 (ref 1–1.02)
URINE CULTURE IF INDICATED: NORMAL
UROBILINOGEN UR QL STRIP.AUTO: 0.2 EU/DL (ref 0.2–1)
WBC # BLD AUTO: 12.3 K/UL (ref 4.3–11.1)
WBC URNS QL MICRO: NORMAL /HPF

## 2024-04-19 PROCEDURE — G0378 HOSPITAL OBSERVATION PER HR: HCPCS

## 2024-04-19 PROCEDURE — 96360 HYDRATION IV INFUSION INIT: CPT

## 2024-04-19 PROCEDURE — 51702 INSERT TEMP BLADDER CATH: CPT

## 2024-04-19 PROCEDURE — 81001 URINALYSIS AUTO W/SCOPE: CPT

## 2024-04-19 PROCEDURE — 2500000003 HC RX 250 WO HCPCS: Performed by: INTERNAL MEDICINE

## 2024-04-19 PROCEDURE — 82962 GLUCOSE BLOOD TEST: CPT

## 2024-04-19 PROCEDURE — 6370000000 HC RX 637 (ALT 250 FOR IP): Performed by: INTERNAL MEDICINE

## 2024-04-19 PROCEDURE — 2580000003 HC RX 258: Performed by: INTERNAL MEDICINE

## 2024-04-19 PROCEDURE — 71045 X-RAY EXAM CHEST 1 VIEW: CPT

## 2024-04-19 PROCEDURE — 83735 ASSAY OF MAGNESIUM: CPT

## 2024-04-19 PROCEDURE — 99285 EMERGENCY DEPT VISIT HI MDM: CPT

## 2024-04-19 PROCEDURE — 96361 HYDRATE IV INFUSION ADD-ON: CPT

## 2024-04-19 PROCEDURE — 2580000003 HC RX 258: Performed by: EMERGENCY MEDICINE

## 2024-04-19 PROCEDURE — 80048 BASIC METABOLIC PNL TOTAL CA: CPT

## 2024-04-19 PROCEDURE — 70450 CT HEAD/BRAIN W/O DYE: CPT

## 2024-04-19 PROCEDURE — 85027 COMPLETE CBC AUTOMATED: CPT

## 2024-04-19 PROCEDURE — 51798 US URINE CAPACITY MEASURE: CPT

## 2024-04-19 PROCEDURE — 70551 MRI BRAIN STEM W/O DYE: CPT

## 2024-04-19 RX ORDER — ONDANSETRON 4 MG/1
4 TABLET, ORALLY DISINTEGRATING ORAL EVERY 8 HOURS PRN
Status: DISCONTINUED | OUTPATIENT
Start: 2024-04-19 | End: 2024-04-27 | Stop reason: HOSPADM

## 2024-04-19 RX ORDER — IBUPROFEN 600 MG/1
1 TABLET ORAL PRN
Status: DISCONTINUED | OUTPATIENT
Start: 2024-04-19 | End: 2024-04-27 | Stop reason: HOSPADM

## 2024-04-19 RX ORDER — ASPIRIN 81 MG/1
81 TABLET ORAL DAILY
Status: DISCONTINUED | OUTPATIENT
Start: 2024-04-19 | End: 2024-04-27 | Stop reason: HOSPADM

## 2024-04-19 RX ORDER — HYDRALAZINE HYDROCHLORIDE 20 MG/ML
20 INJECTION INTRAMUSCULAR; INTRAVENOUS EVERY 6 HOURS PRN
Status: DISCONTINUED | OUTPATIENT
Start: 2024-04-19 | End: 2024-04-21

## 2024-04-19 RX ORDER — DOCUSATE SODIUM 100 MG/1
100 CAPSULE, LIQUID FILLED ORAL 2 TIMES DAILY
Status: DISCONTINUED | OUTPATIENT
Start: 2024-04-19 | End: 2024-04-27 | Stop reason: HOSPADM

## 2024-04-19 RX ORDER — INSULIN GLARGINE 100 [IU]/ML
10 INJECTION, SOLUTION SUBCUTANEOUS NIGHTLY
Status: DISCONTINUED | OUTPATIENT
Start: 2024-04-19 | End: 2024-04-20

## 2024-04-19 RX ORDER — ATORVASTATIN CALCIUM 80 MG/1
80 TABLET, FILM COATED ORAL NIGHTLY
Status: DISCONTINUED | OUTPATIENT
Start: 2024-04-19 | End: 2024-04-24

## 2024-04-19 RX ORDER — CARBOXYMETHYLCELLULOSE SODIUM 10 MG/ML
1 GEL OPHTHALMIC 3 TIMES DAILY PRN
Status: DISCONTINUED | OUTPATIENT
Start: 2024-04-19 | End: 2024-04-27 | Stop reason: HOSPADM

## 2024-04-19 RX ORDER — BISACODYL 10 MG
10 SUPPOSITORY, RECTAL RECTAL DAILY PRN
Status: DISCONTINUED | OUTPATIENT
Start: 2024-04-19 | End: 2024-04-27 | Stop reason: HOSPADM

## 2024-04-19 RX ORDER — POTASSIUM CHLORIDE 7.45 MG/ML
10 INJECTION INTRAVENOUS PRN
Status: DISCONTINUED | OUTPATIENT
Start: 2024-04-19 | End: 2024-04-20

## 2024-04-19 RX ORDER — LEVETIRACETAM 500 MG/1
500 TABLET ORAL 2 TIMES DAILY
Status: DISCONTINUED | OUTPATIENT
Start: 2024-04-19 | End: 2024-04-27 | Stop reason: HOSPADM

## 2024-04-19 RX ORDER — SODIUM CHLORIDE 9 MG/ML
INJECTION, SOLUTION INTRAVENOUS CONTINUOUS
Status: DISCONTINUED | OUTPATIENT
Start: 2024-04-19 | End: 2024-04-23

## 2024-04-19 RX ORDER — SODIUM CHLORIDE 9 MG/ML
INJECTION, SOLUTION INTRAVENOUS PRN
Status: DISCONTINUED | OUTPATIENT
Start: 2024-04-19 | End: 2024-04-27 | Stop reason: HOSPADM

## 2024-04-19 RX ORDER — ACETAMINOPHEN 325 MG/1
650 TABLET ORAL EVERY 6 HOURS PRN
Status: DISCONTINUED | OUTPATIENT
Start: 2024-04-19 | End: 2024-04-27 | Stop reason: HOSPADM

## 2024-04-19 RX ORDER — SODIUM CHLORIDE 0.9 % (FLUSH) 0.9 %
5-40 SYRINGE (ML) INJECTION EVERY 12 HOURS SCHEDULED
Status: DISCONTINUED | OUTPATIENT
Start: 2024-04-19 | End: 2024-04-27 | Stop reason: HOSPADM

## 2024-04-19 RX ORDER — MINERAL OIL AND WHITE PETROLATUM 150; 830 MG/G; MG/G
OINTMENT OPHTHALMIC PRN
Status: DISCONTINUED | OUTPATIENT
Start: 2024-04-19 | End: 2024-04-19

## 2024-04-19 RX ORDER — ONDANSETRON 2 MG/ML
4 INJECTION INTRAMUSCULAR; INTRAVENOUS EVERY 6 HOURS PRN
Status: DISCONTINUED | OUTPATIENT
Start: 2024-04-19 | End: 2024-04-27 | Stop reason: HOSPADM

## 2024-04-19 RX ORDER — MAGNESIUM SULFATE IN WATER 40 MG/ML
2000 INJECTION, SOLUTION INTRAVENOUS PRN
Status: DISCONTINUED | OUTPATIENT
Start: 2024-04-19 | End: 2024-04-20

## 2024-04-19 RX ORDER — INSULIN LISPRO 100 [IU]/ML
0-8 INJECTION, SOLUTION INTRAVENOUS; SUBCUTANEOUS
Status: DISCONTINUED | OUTPATIENT
Start: 2024-04-19 | End: 2024-04-20

## 2024-04-19 RX ORDER — POTASSIUM CHLORIDE 20 MEQ/1
40 TABLET, EXTENDED RELEASE ORAL PRN
Status: DISCONTINUED | OUTPATIENT
Start: 2024-04-19 | End: 2024-04-20

## 2024-04-19 RX ORDER — HYDROCHLOROTHIAZIDE 12.5 MG/1
12.5 CAPSULE, GELATIN COATED ORAL DAILY
Status: DISCONTINUED | OUTPATIENT
Start: 2024-04-19 | End: 2024-04-27 | Stop reason: HOSPADM

## 2024-04-19 RX ORDER — SODIUM CHLORIDE 0.9 % (FLUSH) 0.9 %
5-40 SYRINGE (ML) INJECTION PRN
Status: DISCONTINUED | OUTPATIENT
Start: 2024-04-19 | End: 2024-04-27 | Stop reason: HOSPADM

## 2024-04-19 RX ORDER — AMLODIPINE BESYLATE 5 MG/1
5 TABLET ORAL DAILY
Status: DISCONTINUED | OUTPATIENT
Start: 2024-04-19 | End: 2024-04-23

## 2024-04-19 RX ORDER — POLYETHYLENE GLYCOL 3350 17 G/17G
17 POWDER, FOR SOLUTION ORAL DAILY
Status: DISCONTINUED | OUTPATIENT
Start: 2024-04-20 | End: 2024-04-27 | Stop reason: HOSPADM

## 2024-04-19 RX ORDER — DEXTROSE MONOHYDRATE 100 MG/ML
INJECTION, SOLUTION INTRAVENOUS CONTINUOUS PRN
Status: DISCONTINUED | OUTPATIENT
Start: 2024-04-19 | End: 2024-04-27 | Stop reason: HOSPADM

## 2024-04-19 RX ORDER — LOSARTAN POTASSIUM 50 MG/1
100 TABLET ORAL DAILY
Status: DISCONTINUED | OUTPATIENT
Start: 2024-04-19 | End: 2024-04-27 | Stop reason: HOSPADM

## 2024-04-19 RX ORDER — INSULIN LISPRO 100 [IU]/ML
0-4 INJECTION, SOLUTION INTRAVENOUS; SUBCUTANEOUS NIGHTLY
Status: DISCONTINUED | OUTPATIENT
Start: 2024-04-19 | End: 2024-04-20

## 2024-04-19 RX ADMIN — TUBERCULIN PURIFIED PROTEIN DERIVATIVE 5 UNITS: 5 INJECTION, SOLUTION INTRADERMAL at 17:22

## 2024-04-19 RX ADMIN — INSULIN GLARGINE 10 UNITS: 100 INJECTION, SOLUTION SUBCUTANEOUS at 21:32

## 2024-04-19 RX ADMIN — SODIUM CHLORIDE: 9 INJECTION, SOLUTION INTRAVENOUS at 15:30

## 2024-04-19 RX ADMIN — ATORVASTATIN CALCIUM 80 MG: 80 TABLET, FILM COATED ORAL at 21:32

## 2024-04-19 RX ADMIN — LEVETIRACETAM 500 MG: 500 TABLET, FILM COATED ORAL at 21:32

## 2024-04-19 RX ADMIN — INSULIN LISPRO 4 UNITS: 100 INJECTION, SOLUTION INTRAVENOUS; SUBCUTANEOUS at 17:14

## 2024-04-19 RX ADMIN — SODIUM CHLORIDE, PRESERVATIVE FREE 10 ML: 5 INJECTION INTRAVENOUS at 21:33

## 2024-04-19 RX ADMIN — ASPIRIN 81 MG: 81 TABLET, COATED ORAL at 17:12

## 2024-04-19 RX ADMIN — DOCUSATE SODIUM 100 MG: 100 CAPSULE, LIQUID FILLED ORAL at 21:32

## 2024-04-19 ASSESSMENT — PAIN SCALES - GENERAL
PAINLEVEL_OUTOF10: 0
PAINLEVEL_OUTOF10: 0

## 2024-04-19 NOTE — ED PROVIDER NOTES
Emergency Department Provider Note       PCP: Elver Trevino MD   Age: 84 y.o.   Sex: male     DISPOSITION       No diagnosis found.    Medical Decision Making     I will check his basic blood work and his sugar.  Will look to see if any of his electrolytes are potentially abnormal.  ED Course as of 04/19/24 1455   Fri Apr 19, 2024   1441 His creatinine is up a little bit at 1.8 but I do not think it is clinically significant.  His blood sugar with us is 367 and I think that corrects sodium to about 134.  I do not think he needs any acute intervention and he otherwise feels okay and I will discharge him home. [AC]   1454 I went to discuss the potential discharge plan with the patient and the daughter.  The daughter says that the patient has a new symptom of being unable to lift his right leg.  The symptoms apparently started this morning.  Patient was discharged from our facility yesterday after an admission for an acute stroke.  He is on Eliquis and I do not think he is a TNK candidate as he is well outside the window.  I have asked neurology to consult and I will discuss the case with the hospitalist for admission. [AC]      ED Course User Index  [AC] Mayank Hanna MD     1 acute complicated illness or injury.  Shared medical decision making was utilized in creating the patients health plan today.    I independently ordered and reviewed each unique test.           The patient was admitted and I have discussed patient management with the admitting provider.  The management of this patient was discussed with an external consultant.            History     84-year-old gentleman presents with concerns about elevated blood sugar.  He says overall he feels fine but he was worried because his blood sugar was high at home.  He was just discharged from our facility yesterday after an evaluation for possible stroke and high blood sugar.  Patient said that since going home he thought he was doing well.  He  Arrhythmia     AFlutter/SVT    Arthritis     CAD (coronary artery disease) 2010    2 xstents, Followed by Dr. Betancourt     Chronic kidney disease     Stage III    Coagulation disorder (HCC)     eliquis/plavix    Coronary atherosclerosis of native coronary artery 5/27/2010    2010:  PCI LAD Xience x 2    Diabetes (HCC) type 2    Controlled with diet     Dyslipidemia 5/27/2010    Essential hypertension, benign      History of coronary artery stent placement 2010    LAC, LAD stented    History of kidney stones     History of prior ablation treatment 2014    due to atrial fib    Kidney disease     Was seen by [facility] after sepsis for acute Kidney disease, has since been released    Kidney stone     Paroxysmal atrial fibrillation (HCC) 10/2/2015    Platelet inhibition due to Plavix     Post PTCA     WITH STENT    Sepsis ([facility])     Shingles outbreak     Syncope and collapse 07/25/2014        Past Surgical History:   Procedure Laterality Date    CARDIAC CATHETERIZATION  2010    2 stents    CATARACT REMOVAL Bilateral     CYST REMOVAL  years ago    scrotal    TN UNLISTED PROCEDURE CARDIAC SURGERY  2014    ablation        Social History     Socioeconomic History    Marital status:    Tobacco Use    Smoking status: Never    Smokeless tobacco: Never   Substance and Sexual Activity    Alcohol use: No    Drug use: No     Social Determinants of Health     Food Insecurity: No Food Insecurity (4/16/2024)    Hunger Vital Sign     Worried About Running Out of Food in the Last Year: Never true     Ran Out of Food in the Last Year: Never true   Transportation Needs: No Transportation Needs (4/16/2024)    PRAPARE - Transportation     Lack of Transportation (Medical): No     Lack of Transportation (Non-Medical): No   Housing Stability: Low Risk  (4/16/2024)    Housing Stability Vital Sign     Unable to Pay for Housing in the Last Year: No     Number of Places Lived in the Last Year: 1     Unstable Housing in the Last

## 2024-04-19 NOTE — ED TRIAGE NOTES
Pt arrives via [facility] from home for complaint of BGL being 469. Pt complaining of weakness for the past 2 weeks.

## 2024-04-19 NOTE — PROGRESS NOTES
TRANSFER - IN REPORT:    Verbal report received from GRAEME Berger on Jayy Johnson  being received from ED for routine progression of patient care      Report consisted of patient's Situation, Background, Assessment and   Recommendations(SBAR).     Information from the following report(s) Nurse Handoff Report was reviewed with the receiving nurse.    Opportunity for questions and clarification was provided.      Assessment completed upon patient's arrival to unit and care assumed.

## 2024-04-19 NOTE — PROGRESS NOTES
PROGRESS NOTE    CT head shows right frontal stroke evolution with encephalomalacia.    Patient's complaint is of worse right leg weakness, which is inconsistent with CT findings.    Discussed with neurology who recommends new MRI brain to assess for new left sided stroke.    Will hold all BP meds to allow for permissive hypertension until MRI results.  NIH/neurochecks every 4    Patient also had >400 mL of postvoid residual.  He complains of suprapubic pain and has a history of urinary retention.  Will place Alfonso and start Flomax.  Reassess for Alfonso removal daily.  Strict I's and O's.    Frieda Ghosh, DO  5:11 PM    Addendum    MRI Brain Results:    1.  Punctate acute infarct of the left cerebellum similar to prior  study.  Lateral right frontal and insular infarct is also unchanged  from prior.  Several scattered punctate infarcts of the cerebral  hemispheres towards the vertex, left-greater-than-right, also noted  similar to prior.  No clear evidence of new infarct.  2.  There is motion artifact which obscures details of the exam.  3.  Mild generalized volume loss.  Mild chronic ischemic change of the  white matter.    At this point I will restart BP meds due to no acute infarcts.    Frieda Ghosh, DO  8:20 PM

## 2024-04-19 NOTE — H&P
Hospitalist History and Physical   Admit Date:  2024  1:27 PM   Name:  Jayy Johnson   Age:  84 y.o.  Sex:  male  :  1940   MRN:  [id number]   Room:  ER01/    Presenting/Chief Complaint: Hyperglycemia     Reason(s) for Admission: ORLANDO (acute kidney injury) (HCC) [N17.9]     History of Present Illness:   Jayy Johnson is a 84 y.o. male with chronic persistent atrial fibrillation (on Xarelto), HTN, DM2, epilepsy and recent cardioembolic CVA presented back to the ER on  with worsening right leg weakness and generalized fatigue after being discharged home on .  Per the patient, and daughter at bedside, the patient felt good going home on  but upon arrival to the house he was unable to get up and walk and needed maximum assistance to get into the house.  They got him to his chair where he remained.  He ate and drank minimal food and drink while at home.  He was unable to get out of the chair on  so EMS was called to bring him back to the ER.  Upon arrival to the ER, it was noted that his right leg was 2/5 strength, which was new from the day before.  Neurology evaluated the patient and deemed him not a TNKase candidate due to being on Xarelto.  He was found to have mild ORLANDO and hyponatremia, which she did not have on .  Denies chest pain.  Denies SOB/cough.  Denies N/V.  Denies BLE edema.    Assessment & Plan:     Principal Problem:    OR[place]O (acute kidney injury) (HCC)    Dehydration  Baseline creatinine 1.3-1.4   creatinine 1.4   creatinine 1.8 => up 0.4 in 24-hour period  I suspect this is due to dehydration  Start normal saline at 125 mL/h  Strict I's and O's  Hold HCTZ  Will continue Cozaar for now => hold if kidney function worsens  Check BMP daily    Active Problems:    Right leg weakness  I suspect this is progression from previous recent cardioembolic CVA  Neurology evaluated in ER and recommended continue conservative care  CT head pending  Continue ASA 81 mg  daily  Continue Lipitor 80 mg nightly  Continue Xarelto 20 mg daily      Hyponatremia  4/18 sodium 134  4/19 sodium 128  I suspect this is due to dehydration  Start normal saline at 125 mL/h  Check BMP daily  If no improvement tomorrow morning, will check urine studies      Leukocytosis  4/18 WBC 10.4  4/19 WBC 12.3  Chest x-ray on 4/16 with right middle lobe infiltrate => was discharged home on Augmentin  Repeat chest x-ray now  Check UA  Start Levaquin since infiltrate was present on prior admission      Right middle lobe pneumonia  Chest x-ray on 4/16 with a right middle lobe infiltrate  Repeat chest x-ray now  Start Levaquin since infiltrate was present on prior admission      Atherosclerosis of native coronary artery of native heart with stable angina pectoris (HCC)  No acute chest pain  Continue ASA 81 mg daily  Continue Cozaar 100 mg daily  Continue Lipitor 80 mg daily      Essential hypertension, benign  BP currently stable  Continue amlodipine 5 mg daily  Continue Cozaar 100 mg daily  Hold HCTZ until kidney function improves      Type 2 diabetes mellitus ([facility])  A1c on 4/17 9.3  Hold glipizide in the setting of ORLANDO  Start Lantus 10 units nightly  Start Humalog SSI      Chronic atrial fibrillation (HCC)  Patient currently in atrial fibrillation  Continue Xarelto 20 mg daily      Epilepsy ([facility])  Continue Keppra 500 mg twice daily      History of cardioembolic cerebrovascular accident (CVA)  Patient with recent cardioembolic CVA  Continue ASA 81 mg daily  Continue Xarelto 20 mg daily  PT/OT      PT/OT evals and PPD ordered?  Therapy and PPD  Diet: No diet orders on file  VTE prophylaxis: Already on anticoagulation  Code status: Prior      Non-peripheral Lines and Tubes (if present):             Hospital Problems:  Principal Problem:    [place]O (acute kidney injury) ([facility])  Active Problems:    Right leg weakness    Hyponatremia    Leukocytosis    Right middle lobe pneumonia    Atherosclerosis of native coronary  Topical, 2 TIMES DAILY    docusate (COLACE, DULCOLAX) 100 mg, Oral, 2 TIMES DAILY    ganciclovir (ZIRGAN) 0.15 % GEL ophthalmic gel 1 drop, Ophthalmic, EVERY EVENING    glipiZIDE (GLUCOTROL) 5 mg, Oral, 2 TIMES DAILY BEFORE MEALS    hydroCHLOROthiazide 12.5 mg, Oral, DAILY    levETIRAcetam (KEPPRA) 500 mg, Oral, 2 TIMES DAILY    losartan (COZAAR) 100 mg, Oral, DAILY    nitroGLYCERIN (NITROSTAT) 0.4 mg, SubLINGual    polyethylene glycol (GLYCOLAX) 17 GM/SCOOP powder 0.4 g/kg, Oral, DAILY    rivaroxaban (XARELTO) 20 mg, Oral, DAILY WITH BREAKFAST       I have personally reviewed labs and tests:  Recent Results (from the past 24 hour(s))   CBC    Collection Time: 04/19/24  1:41 PM   Result Value Ref Range    WBC 12.3 (H) 4.3 - 11.1 K/uL    RBC 4.93 4.23 - 5.6 M/uL    Hemoglobin 15.7 13.6 - 17.2 g/dL    Hematocrit 47.4 41.1 - 50.3 %    MCV 96.1 82 - 102 FL    MCH 31.8 26.1 - 32.9 PG    MCHC 33.1 31.4 - 35.0 g/dL    RDW 12.9 11.9 - 14.6 %    Platelets 240 150 - 450 K/uL    MPV 12.1 9.4 - 12.3 FL    nRBC 0.00 0.0 - 0.2 K/uL   Basic Metabolic Panel    Collection Time: 04/19/24  1:41 PM   Result Value Ref Range    Sodium 128 (L) 136 - 146 mmol/L    Potassium 4.8 3.5 - 5.1 mmol/L    Chloride 98 (L) 103 - 113 mmol/L    CO2 24 21 - 32 mmol/L    Anion Gap 6 2 - 11 mmol/L    Glucose 367 (H) 65 - 100 mg/dL    BUN 32 (H) 8 - 23 MG/DL    Creatinine 1.80 (H) 0.8 - 1.5 MG/DL    Est, Glom Filt Rate 37 (L) >60 ml/min/1.73m2    Calcium 9.2 8.3 - 10.4 MG/DL   Magnesium    Collection Time: 04/19/24  1:41 PM   Result Value Ref Range    Magnesium 2.2 1.8 - 2.4 mg/dL   POCT Glucose    Collection Time: 04/19/24  1:41 PM   Result Value Ref Range    POC Glucose 338 (H) 65 - 100 mg/dL    Performed by: Elver        No results for input(s): \"COVID19\" in the last 72 hours.    US ABDOMEN COMPLETE    Result Date: 4/18/2024  ABDOMINAL  ULTRASOUND INDICATION: Transaminitis. COMPARISON: 6/26/2015 CT abdomen pelvis. Transabdominal imaging of

## 2024-04-20 ENCOUNTER — APPOINTMENT (OUTPATIENT)
Dept: MRI IMAGING | Age: 84
DRG: 064 | End: 2024-04-20
Payer: MEDICARE

## 2024-04-20 ENCOUNTER — APPOINTMENT (OUTPATIENT)
Dept: CT IMAGING | Age: 84
DRG: 064 | End: 2024-04-20
Payer: MEDICARE

## 2024-04-20 ENCOUNTER — APPOINTMENT (OUTPATIENT)
Dept: GENERAL RADIOLOGY | Age: 84
DRG: 064 | End: 2024-04-20
Payer: MEDICARE

## 2024-04-20 LAB
ANION GAP SERPL CALC-SCNC: 5 MMOL/L (ref 2–11)
BUN SERPL-MCNC: 31 MG/DL (ref 8–23)
CALCIUM SERPL-MCNC: 8.6 MG/DL (ref 8.3–10.4)
CHLORIDE SERPL-SCNC: 103 MMOL/L (ref 103–113)
CK SERPL-CCNC: 284 U/L (ref 21–215)
CO2 SERPL-SCNC: 25 MMOL/L (ref 21–32)
CREAT SERPL-MCNC: 1.6 MG/DL (ref 0.8–1.5)
EKG DIAGNOSIS: NORMAL
EKG Q-T INTERVAL: 382 MS
EKG QRS DURATION: 84 MS
EKG QTC CALCULATION (BAZETT): 429 MS
EKG R AXIS: 43 DEGREES
EKG T AXIS: 23 DEGREES
EKG VENTRICULAR RATE: 76 BPM
ERYTHROCYTE [DISTWIDTH] IN BLOOD BY AUTOMATED COUNT: 12.9 % (ref 11.9–14.6)
GLUCOSE BLD STRIP.AUTO-MCNC: 208 MG/DL (ref 65–100)
GLUCOSE BLD STRIP.AUTO-MCNC: 219 MG/DL (ref 65–100)
GLUCOSE BLD STRIP.AUTO-MCNC: 222 MG/DL (ref 65–100)
GLUCOSE BLD STRIP.AUTO-MCNC: 238 MG/DL (ref 65–100)
GLUCOSE BLD STRIP.AUTO-MCNC: 246 MG/DL (ref 65–100)
GLUCOSE BLD STRIP.AUTO-MCNC: 395 MG/DL (ref 65–100)
GLUCOSE SERPL-MCNC: 276 MG/DL (ref 65–100)
HCT VFR BLD AUTO: 42.1 % (ref 41.1–50.3)
HGB BLD-MCNC: 14.1 G/DL (ref 13.6–17.2)
MCH RBC QN AUTO: 31.8 PG (ref 26.1–32.9)
MCHC RBC AUTO-ENTMCNC: 33.5 G/DL (ref 31.4–35)
MCV RBC AUTO: 95 FL (ref 82–102)
MM INDURATION, POC: 0 MM (ref 0–5)
NRBC # BLD: 0 K/UL (ref 0–0.2)
PHOSPHATE SERPL-MCNC: 3.7 MG/DL (ref 2.3–3.7)
PLATELET # BLD AUTO: 212 K/UL (ref 150–450)
PMV BLD AUTO: 11.7 FL (ref 9.4–12.3)
POTASSIUM SERPL-SCNC: 4.2 MMOL/L (ref 3.5–5.1)
PPD, POC: NEGATIVE
RBC # BLD AUTO: 4.43 M/UL (ref 4.23–5.6)
SERVICE CMNT-IMP: ABNORMAL
SODIUM SERPL-SCNC: 133 MMOL/L (ref 136–146)
WBC # BLD AUTO: 11.8 K/UL (ref 4.3–11.1)

## 2024-04-20 PROCEDURE — 70551 MRI BRAIN STEM W/O DYE: CPT

## 2024-04-20 PROCEDURE — 6370000000 HC RX 637 (ALT 250 FOR IP): Performed by: INTERNAL MEDICINE

## 2024-04-20 PROCEDURE — 36415 COLL VENOUS BLD VENIPUNCTURE: CPT

## 2024-04-20 PROCEDURE — 96361 HYDRATE IV INFUSION ADD-ON: CPT

## 2024-04-20 PROCEDURE — 82550 ASSAY OF CK (CPK): CPT

## 2024-04-20 PROCEDURE — 70450 CT HEAD/BRAIN W/O DYE: CPT

## 2024-04-20 PROCEDURE — 6370000000 HC RX 637 (ALT 250 FOR IP): Performed by: NURSE PRACTITIONER

## 2024-04-20 PROCEDURE — 2580000003 HC RX 258: Performed by: INTERNAL MEDICINE

## 2024-04-20 PROCEDURE — 85027 COMPLETE CBC AUTOMATED: CPT

## 2024-04-20 PROCEDURE — G0378 HOSPITAL OBSERVATION PER HR: HCPCS

## 2024-04-20 PROCEDURE — 92610 EVALUATE SWALLOWING FUNCTION: CPT

## 2024-04-20 PROCEDURE — 6360000002 HC RX W HCPCS: Performed by: INTERNAL MEDICINE

## 2024-04-20 PROCEDURE — 93010 ELECTROCARDIOGRAM REPORT: CPT | Performed by: INTERNAL MEDICINE

## 2024-04-20 PROCEDURE — 74018 RADEX ABDOMEN 1 VIEW: CPT

## 2024-04-20 PROCEDURE — 80048 BASIC METABOLIC PNL TOTAL CA: CPT

## 2024-04-20 PROCEDURE — 97162 PT EVAL MOD COMPLEX 30 MIN: CPT

## 2024-04-20 PROCEDURE — 84100 ASSAY OF PHOSPHORUS: CPT

## 2024-04-20 PROCEDURE — 82962 GLUCOSE BLOOD TEST: CPT

## 2024-04-20 PROCEDURE — 93005 ELECTROCARDIOGRAM TRACING: CPT | Performed by: PHYSICIAN ASSISTANT

## 2024-04-20 PROCEDURE — 6370000000 HC RX 637 (ALT 250 FOR IP): Performed by: STUDENT IN AN ORGANIZED HEALTH CARE EDUCATION/TRAINING PROGRAM

## 2024-04-20 PROCEDURE — 96374 THER/PROPH/DIAG INJ IV PUSH: CPT

## 2024-04-20 PROCEDURE — 99222 1ST HOSP IP/OBS MODERATE 55: CPT | Performed by: STUDENT IN AN ORGANIZED HEALTH CARE EDUCATION/TRAINING PROGRAM

## 2024-04-20 PROCEDURE — 97530 THERAPEUTIC ACTIVITIES: CPT

## 2024-04-20 RX ORDER — LEVOFLOXACIN 500 MG/1
750 TABLET, FILM COATED ORAL EVERY OTHER DAY
Status: COMPLETED | OUTPATIENT
Start: 2024-04-20 | End: 2024-04-22

## 2024-04-20 RX ORDER — INSULIN LISPRO 100 [IU]/ML
4 INJECTION, SOLUTION INTRAVENOUS; SUBCUTANEOUS ONCE
Status: COMPLETED | OUTPATIENT
Start: 2024-04-20 | End: 2024-04-20

## 2024-04-20 RX ORDER — LEVOFLOXACIN 500 MG/1
500 TABLET, FILM COATED ORAL DAILY
Status: DISCONTINUED | OUTPATIENT
Start: 2024-04-20 | End: 2024-04-20

## 2024-04-20 RX ORDER — INSULIN GLARGINE 100 [IU]/ML
20 INJECTION, SOLUTION SUBCUTANEOUS NIGHTLY
Status: DISCONTINUED | OUTPATIENT
Start: 2024-04-20 | End: 2024-04-21

## 2024-04-20 RX ORDER — INSULIN LISPRO 100 [IU]/ML
0-4 INJECTION, SOLUTION INTRAVENOUS; SUBCUTANEOUS NIGHTLY
Status: DISCONTINUED | OUTPATIENT
Start: 2024-04-20 | End: 2024-04-22

## 2024-04-20 RX ORDER — INSULIN LISPRO 100 [IU]/ML
0-16 INJECTION, SOLUTION INTRAVENOUS; SUBCUTANEOUS
Status: DISCONTINUED | OUTPATIENT
Start: 2024-04-20 | End: 2024-04-22

## 2024-04-20 RX ADMIN — DOCUSATE SODIUM 100 MG: 100 CAPSULE, LIQUID FILLED ORAL at 08:45

## 2024-04-20 RX ADMIN — RIVAROXABAN 2.5 MG: 2.5 TABLET, FILM COATED ORAL at 09:46

## 2024-04-20 RX ADMIN — RIVAROXABAN 15 MG: 15 TABLET, FILM COATED ORAL at 08:45

## 2024-04-20 RX ADMIN — ONDANSETRON 4 MG: 2 INJECTION INTRAMUSCULAR; INTRAVENOUS at 06:19

## 2024-04-20 RX ADMIN — INSULIN LISPRO 2 UNITS: 100 INJECTION, SOLUTION INTRAVENOUS; SUBCUTANEOUS at 08:44

## 2024-04-20 RX ADMIN — LEVETIRACETAM 500 MG: 500 TABLET, FILM COATED ORAL at 08:46

## 2024-04-20 RX ADMIN — INSULIN LISPRO 4 UNITS: 100 INJECTION, SOLUTION INTRAVENOUS; SUBCUTANEOUS at 11:58

## 2024-04-20 RX ADMIN — DOCUSATE SODIUM 100 MG: 100 CAPSULE, LIQUID FILLED ORAL at 21:31

## 2024-04-20 RX ADMIN — LEVETIRACETAM 500 MG: 500 TABLET, FILM COATED ORAL at 21:31

## 2024-04-20 RX ADMIN — LEVOFLOXACIN 750 MG: 500 TABLET, FILM COATED ORAL at 09:43

## 2024-04-20 RX ADMIN — ATORVASTATIN CALCIUM 80 MG: 80 TABLET, FILM COATED ORAL at 21:31

## 2024-04-20 RX ADMIN — INSULIN LISPRO 8 UNITS: 100 INJECTION, SOLUTION INTRAVENOUS; SUBCUTANEOUS at 11:58

## 2024-04-20 RX ADMIN — RIVAROXABAN 2.5 MG: 2.5 TABLET, FILM COATED ORAL at 09:45

## 2024-04-20 RX ADMIN — INSULIN LISPRO 4 UNITS: 100 INJECTION, SOLUTION INTRAVENOUS; SUBCUTANEOUS at 21:30

## 2024-04-20 RX ADMIN — INSULIN GLARGINE 20 UNITS: 100 INJECTION, SOLUTION SUBCUTANEOUS at 21:31

## 2024-04-20 RX ADMIN — POLYETHYLENE GLYCOL 3350 17 G: 17 POWDER, FOR SOLUTION ORAL at 08:46

## 2024-04-20 RX ADMIN — ASPIRIN 81 MG: 81 TABLET, COATED ORAL at 08:45

## 2024-04-20 RX ADMIN — INSULIN LISPRO 4 UNITS: 100 INJECTION, SOLUTION INTRAVENOUS; SUBCUTANEOUS at 17:06

## 2024-04-20 RX ADMIN — SODIUM CHLORIDE, PRESERVATIVE FREE 10 ML: 5 INJECTION INTRAVENOUS at 08:50

## 2024-04-20 RX ADMIN — SODIUM CHLORIDE, PRESERVATIVE FREE 10 ML: 5 INJECTION INTRAVENOUS at 21:31

## 2024-04-20 RX ADMIN — SODIUM CHLORIDE: 9 INJECTION, SOLUTION INTRAVENOUS at 21:47

## 2024-04-20 RX ADMIN — SODIUM CHLORIDE: 9 INJECTION, SOLUTION INTRAVENOUS at 05:02

## 2024-04-20 ASSESSMENT — PAIN SCALES - GENERAL
PAINLEVEL_OUTOF10: 0
PAINLEVEL_OUTOF10: 0

## 2024-04-20 ASSESSMENT — PAIN - FUNCTIONAL ASSESSMENT: PAIN_FUNCTIONAL_ASSESSMENT: NONE - DENIES PAIN

## 2024-04-20 NOTE — ICUWATCH
RRT Clinical Rounding Nurse Update    Vitals:    04/20/24 0541 04/20/24 0800 04/20/24 1200 04/20/24 1601   BP: (!) 148/82 (!) 137/102 (!) 141/62 (!) 148/80   Pulse: 62 69 80 53   Resp: 14 16 16 18   Temp: 98.6 °F (37 °C) 98.4 °F (36.9 °C) 98.1 °F (36.7 °C) 97.9 °F (36.6 °C)   TempSrc: Oral Oral Oral Oral   SpO2: 97% 96% 96% 95%   Weight:       Height:            DETERIORATION INDEX SCORE: 37    ASSESSMENT:  Previous outreach assessment was reviewed. Patient currently sleeping. On-going R sided weakness per family at bedside. Weaker this afternoon that this morning. Respirations unlabored on RA. Insulin regimen being adjusted per hospitalist for better control.     PLAN:  Will follow per RRT Clinical Rounding Program protocol.    Iván Townsend RN  [facility]: [phone]  [facility]: [phone]

## 2024-04-20 NOTE — PROGRESS NOTES
Hospitalist Progress Note   Admit Date:  2024  1:27 PM   Name:  Jayy Johnson   Age:  84 y.o.  Sex:  male  :  1940   MRN:  [id number]   Room:  [facility]/    Presenting/Chief Complaint: Hyperglycemia     Reason(s) for Admission: Hyperglycemia [R73.9]  Right leg weakness [R29.898]  ORLANDO (acute kidney injury) (HCC) [N17.9]     Hospital Course:   Please see admitting H&P for details of presentation.  In brief, Jayy Johnson is a 84 y.o. male with chronic persistent atrial fibrillation (on Xarelto), HTN, DM2, epilepsy and recent cardioembolic CVA presented back to the ER on  with worsening right leg weakness and generalized fatigue after being discharged home on .     Subjective & 24hr Events:   Patient seen this morning with his daughter Arielle present along with RN.  I reviewed findings and plan.  Later on it appears that neurology is treating this as acute infarcts and requests permissive hypertension along with strict glucose control.  The patient has had poor glucose control for the past few months so we will consult DM educator.  XR abdomen checked due to family concern however there is no obstruction.  The patient denies vomiting and he displayed a very good appetite with breakfast this morning.  Will give MiraLAX and Colace on daily basis.  Continue IVF and trend BMP in the morning.  Monitor telemetry for bradycardia; if patient is symptomatic consider cardiology consult.    Assessment & Plan:     Principal Problem:  [place]O (acute kidney injury) (HCC)  Dehydration  Baseline creatinine 1.3-1.4   creatinine 1.4   creatinine 1.8 => up 0.4 in 24-hour period  due to dehydration  Give normal saline at 125 mL/h  I&O  Hold HCTZ, hold ARB  sCr 1.6 on      Active Problems:  R leg weakness  suspected progression from previous recent cardioembolic CVA  Neurology evaluated in ER and recommended continue conservative care  Continue ASA 81 mg daily  Continue Lipitor 80 mg

## 2024-04-20 NOTE — PLAN OF CARE
Problem: Safety - Adult  Goal: Free from fall injury  4/19/2024 2213 by Edita Harding RN  Outcome: Progressing  4/19/2024 1826 by Vini Ibrahim RN  Outcome: Progressing     Problem: Discharge Planning  Goal: Discharge to home or other facility with appropriate resources  4/19/2024 2213 by Edita Harding RN  Outcome: Progressing  Flowsheets (Taken 4/19/2024 2000)  Discharge to home or other facility with appropriate resources: Identify barriers to discharge with patient and caregiver  4/19/2024 1826 by Vini Ibrahim RN  Outcome: Progressing     Problem: Pain  Goal: Verbalizes/displays adequate comfort level or baseline comfort level  4/19/2024 2213 by Edita Harding RN  Outcome: Progressing  4/19/2024 1826 by Vini Ibrahim RN  Outcome: Progressing     Problem: Skin/Tissue Integrity  Goal: Absence of new skin breakdown  Description: 1.  Monitor for areas of redness and/or skin breakdown  2.  Assess vascular access sites hourly  3.  Every 4-6 hours minimum:  Change oxygen saturation probe site  4.  Every 4-6 hours:  If on nasal continuous positive airway pressure, respiratory therapy assess nares and determine need for appliance change or resting period.  4/19/2024 2213 by Edita Harding RN  Outcome: Progressing  4/19/2024 1826 by Vini Ibrahim RN  Outcome: Progressing     Problem: Neurosensory - Adult  Goal: Achieves stable or improved neurological status  Outcome: Progressing  Flowsheets (Taken 4/19/2024 2000)  Achieves stable or improved neurological status: Assess for and report changes in neurological status     Problem: Respiratory - Adult  Goal: Achieves optimal ventilation and oxygenation  Outcome: Progressing  Flowsheets (Taken 4/19/2024 2000)  Achieves optimal ventilation and oxygenation: Assess for changes in respiratory status     Problem: Cardiovascular - Adult  Goal: Maintains optimal cardiac output and hemodynamic stability  Outcome: Progressing  Flowsheets (Taken 4/19/2024 2000)  Maintains optimal  cardiac output and hemodynamic stability: Monitor blood pressure and heart rate     Problem: Skin/Tissue Integrity - Adult  Goal: Skin integrity remains intact  Outcome: Progressing  Flowsheets (Taken 4/19/2024 2000)  Skin Integrity Remains Intact: Monitor for areas of redness and/or skin breakdown     Problem: Musculoskeletal - Adult  Goal: Return mobility to safest level of function  Outcome: Progressing  Flowsheets (Taken 4/19/2024 2000)  Return Mobility to Safest Level of Function: Assess patient stability and activity tolerance for standing, transferring and ambulating with or without assistive devices     Problem: Gastrointestinal - Adult  Goal: Minimal or absence of nausea and vomiting  Outcome: Progressing  Flowsheets (Taken 4/19/2024 2000)  Minimal or absence of nausea and vomiting: Administer IV fluids as ordered to ensure adequate hydration     Problem: Genitourinary - Adult  Goal: Absence of urinary retention  Outcome: Progressing  Flowsheets (Taken 4/19/2024 2000)  Absence of urinary retention: Assess patient’s ability to void and empty bladder     Problem: Infection - Adult  Goal: Absence of infection at discharge  Outcome: Progressing  Flowsheets (Taken 4/19/2024 2000)  Absence of infection at discharge: Assess and monitor for signs and symptoms of infection     Problem: Metabolic/Fluid and Electrolytes - Adult  Goal: Electrolytes maintained within normal limits  Outcome: Progressing  Flowsheets (Taken 4/19/2024 2000)  Electrolytes maintained within normal limits: Monitor labs and assess patient for signs and symptoms of electrolyte imbalances  Goal: Hemodynamic stability and optimal renal function maintained  Outcome: Progressing  Flowsheets (Taken 4/19/2024 2000)  Hemodynamic stability and optimal renal function maintained: Monitor labs and assess for signs and symptoms of volume excess or deficit  Goal: Glucose maintained within prescribed range  Outcome: Progressing  Flowsheets (Taken 4/19/2024  2000)  Glucose maintained within prescribed range: Monitor blood glucose as ordered     Problem: Hematologic - Adult  Goal: Maintains hematologic stability  Outcome: Progressing  Flowsheets (Taken 4/19/2024 2000)  Maintains hematologic stability: Assess for signs and symptoms of bleeding or hemorrhage

## 2024-04-20 NOTE — ICUWATCH
RRT Clinical Rounding Nurse Update    Vitals:    04/20/24 0531 04/20/24 0531 04/20/24 0541 04/20/24 0800   BP: (!) 148/98  (!) 148/82 (!) 137/102   Pulse: 62  62 69   Resp: 14  14 16   Temp:   98.6 °F (37 °C) 98.4 °F (36.9 °C)   TempSrc:   Oral Oral   SpO2: 97% 97% 97% 96%   Weight:       Height:            DETERIORATION INDEX SCORE: 45    ASSESSMENT:  Previous outreach assessment was reviewed.  Patient wakes to voice. Oriented x2-3. Moves all extremities to command. Weak lower extremities, R more so than L. Denies numbness/tingling. VS, labs, and progress notes reviewed.       PLAN:  Will follow per RRT Clinical Rounding Program protocol.    Iván Townsend RN  [facility]: [phone]  [facility]: [phone]

## 2024-04-20 NOTE — PROGRESS NOTES
Hospitalist CODE S Note   Admit Date:  2024  1:27 PM   Name:  Jayy Johnson   Age:  84 y.o.  Sex:  male  :  1940   MRN:  [id number]   Room:  [facility]/    Presenting Complaint: Hyperglycemia    Reason(s) for Admission: Hyperglycemia [R73.9]  Right leg weakness [R29.898]  OR[place]O (acute kidney injury) (HCC) [N17.9]     Rapid Response or Critical Care Event:   CODE S was called due to increase in NIH tonight.  Patient was recently hospitalized with cardioembolic CVA.  He has been on Xarelto (along with aspiring and statin).  Repeat NIH has doubled since last neurocheck.  Patient has weakness BLE and RUE now, marked L facial droop, report of visual field change.  STAT CT head to rule-out possible hemorrhagic conversion.  Tele-neurology consulted as well.    Discussed evaluation and plan with daughter at bedside.  She expressed understanding and agreement with outlined plan.    Tele-neurology is in agreement that patient remains not a candidate for thrombolysis.  Recommends in-house Neurology consultation in the AM.    Patient is critically ill.  Without intervention, there is a high probability of imminent acute organ impairment or life-threatening deterioration.  Total critical care time spent: 35 minutes.        Objective:   Patient Vitals for the past 24 hrs:   Temp Pulse Resp BP SpO2   24 0406 98.6 °F (37 °C) 66 22 (!) 147/58 95 %   24 0103 99.1 °F (37.3 °C) 91 22 (!) 150/56 94 %   24 0000 98.6 °F (37 °C) 91 20 (!) 152/58 96 %   24 2352 -- (!) 44 -- -- --   24 2210 -- 56 -- -- --   24 2000 98.4 °F (36.9 °C) 65 20 135/79 96 %   24 1642 98.1 °F (36.7 °C) 80 16 (!) 141/90 97 %   24 1544 -- -- -- -- 99 %   24 1543 -- -- -- 137/75 97 %   24 1516 -- -- -- -- 93 %   24 1515 -- -- -- (!) 148/69 --   24 1501 -- -- -- -- 96 %   24 1500 -- -- -- (!) 155/78 --   24 1445 -- -- -- (!) 162/69 --   24 1444 -- -- -- -- 96 %    04/19/24 1431 -- -- -- -- 98 %   04/19/24 1430 -- -- -- (!) 160/77 --   04/19/24 1427 -- -- -- -- 97 %   04/19/24 1415 -- -- -- (!) 149/84 --   04/19/24 1330 98.3 °F (36.8 °C) 60 16 (!) 152/50 96 %       Oxygen Therapy  SpO2: 95 %  Pulse via Oximetry: 66 beats per minute  O2 Device: None (Room air)      Recent Labs:  Recent Results (from the past 48 hour(s))   POCT Glucose    Collection Time: 04/18/24  7:33 AM   Result Value Ref Range    POC Glucose 215 (H) 65 - 100 mg/dL    Performed by: Aziza    POCT Glucose    Collection Time: 04/18/24 12:00 PM   Result Value Ref Range    POC Glucose 191 (H) 65 - 100 mg/dL    Performed by: Aziza    CBC    Collection Time: 04/19/24  1:41 PM   Result Value Ref Range    WBC 12.3 (H) 4.3 - 11.1 K/uL    RBC 4.93 4.23 - 5.6 M/uL    Hemoglobin 15.7 13.6 - 17.2 g/dL    Hematocrit 47.4 41.1 - 50.3 %    MCV 96.1 82 - 102 FL    MCH 31.8 26.1 - 32.9 PG    MCHC 33.1 31.4 - 35.0 g/dL    RDW 12.9 11.9 - 14.6 %    Platelets 240 150 - 450 K/uL    MPV 12.1 9.4 - 12.3 FL    nRBC 0.00 0.0 - 0.2 K/uL   Basic Metabolic Panel    Collection Time: 04/19/24  1:41 PM   Result Value Ref Range    Sodium 128 (L) 136 - 146 mmol/L    Potassium 4.8 3.5 - 5.1 mmol/L    Chloride 98 (L) 103 - 113 mmol/L    CO2 24 21 - 32 mmol/L    Anion Gap 6 2 - 11 mmol/L    Glucose 367 (H) 65 - 100 mg/dL    BUN 32 (H) 8 - 23 MG/DL    Creatinine 1.80 (H) 0.8 - 1.5 MG/DL    Est, Glom Filt Rate 37 (L) >60 ml/min/1.73m2    Calcium 9.2 8.3 - 10.4 MG/DL   Magnesium    Collection Time: 04/19/24  1:41 PM   Result Value Ref Range    Magnesium 2.2 1.8 - 2.4 mg/dL   POCT Glucose    Collection Time: 04/19/24  1:41 PM   Result Value Ref Range    POC Glucose 338 (H) 65 - 100 mg/dL    Performed by: Elver    Urinalysis with Reflex to Culture    Collection Time: 04/19/24  4:03 PM    Specimen: Urine   Result Value Ref Range    Color, UA YELLOW/STRAW      Appearance CLEAR      Specific [place], UA 1.012 1.001

## 2024-04-20 NOTE — PROGRESS NOTES
GOALS:  LTG: Patient will maintain adequate hydration/nutrition with optimum safety and efficiency of swallowing function with PO intake without overt signs or symptoms of aspiration for the highest appropriate diet level.  STG:  Patient will consume regular consistency textures and thin liquids without overt signs or symptoms of airway compromise.    LTG: Patient will increase receptive/expressive language skills demonstrated by the ability to communicate basic wants/needs across environments.   STG:  Patient will participate in speech/language evaluation, as indicated, with 100% compliance.    SPEECH LANGUAGE PATHOLOGY: DYSPHAGIA Initial Assessment  OBSERVATION  Acknowledge Order  I  Therapy Time  I   Charges     I  Rehab Caseload Tracker      NAME: Jayy Johnson  : 1940  MRN: [id number]    ADMISSION DATE: 2024  PRIMARY DIAGNOSIS: OR[place]O (acute kidney injury) (HCC)    ICD-10: Treatment Diagnosis: R13.12 Dysphagia, Oropharyngeal Phase    RECOMMENDATIONS   Diet:    Regular Consistency  Thin Liquids    Medication: presented one at a time   Compensatory Swallowing Strategies:   Alternate solids and liquids  Remain upright for 30-45 minutes after meals  Small bites/sips  Total feed  Upright as possible for all oral intake   Therapeutic Intervention:   Patient/family education  Speech/language evaluation to determine if treatment indicated   Patient continues to require skilled intervention:  Yes. Recommend ongoing speech therapy services during this hospitalization.     Anticipated Discharge Needs:  to be determined pending speech/language evaluation results     ASSESSMENT    Patient presents with an essentially normal oropharyngeal swallow function. Patient tolerated thin liquids via straw and regular consistency solids without overt signs of airway compromise as evidenced by timely mastication with subsequent swallows and no cough response. Patient's daughter present and stated patient had difficulty

## 2024-04-20 NOTE — SIGNIFICANT EVENT
Called by RN regarding pt c/o intermittent new RLE weakness.    MRI brain completed earlier without new abnl.  Neurology evaluated earlier and deemed pt not a candidate for TNK / TPA even if new CVA as pt on xarelto.       Evaluated patient at bedside; he is able to move both b/l LE on command and no new focal deficits.  EKG completed earlier for reported bradycardia; pt with afib HR 69 on EKG.  Will check CK for completeness given [place]O.  Pt appropriately on ASA / statin / xarelto.      Repeat stat VS wnl.  Stat glucose 246.    Will closely monitor.    Hetal Bustillos PA-C  Hospitalist

## 2024-04-20 NOTE — CONSULTS
Neurology Consult Note       History:   84-year-old male with atrial fibrillation on Xarelto, diabetes, hypertension, CKD 3.  Presented 4/16/24 for increased confusion and high glucose levels.  There were also staring spells.  Found to have multifocal acute strokes on MRI, most notable in the right distal cortex.  He has been compliant with Xarelto and aspirin.  Discharged on 4/18/2024 with glipizide.  Returned 4/19/2024 with worsening right leg weakness.  Blood sugars documented at 496.  Neurology re-consulted.       Exam: Pertinent positives and negatives include:  Fully awake alert and oriented. No language deficits or dysarthria.  No involuntary abnormal saccades or nystagmus.  No facial asymmetry.  Left upper extremity 4/5 weakness.  Right lower extremity 4/5 weakness. No abnormal involuntary movements. Sensation is intact to light touch.       Imaging and review of data:   MRI brain with multifocal acute strokes, most notable in the distal right frontal cortex.     TTE ~severely dilated left atrium    EEG with sporadic epileptiform discharges over left central parietal region.       Assessment and Plan:   84-year-old male with the above history presents with multifocal acute ischemic strokes in the setting of atrial fibrillation.  Also with reported staring spells, with EEG changes as above. Readmitted 4/19/2024 with new/worsening symptoms of right leg weakness in setting of hyperglycemia.    Plan:  Strict glucose control <180 while inpatient.  His strokes/neurologic deficits will continue to evolve if his blood sugars are not well-controlled.     Continue full dose Xarelto 20 mg/d and aspirin.      Permissive hypertension <220/110 for the next 24 hours, then gradually lower to <180/100.  Avoid major fluctuations.     Continue Keppra 500 mg twice daily, indefinitely.  Seizure precautions discussed, including no driving until seizure-free for at least 6 months or until cleared by a neurologist.     No further

## 2024-04-20 NOTE — PROGRESS NOTES
ACUTE PHYSICAL THERAPY GOALS:   (Developed with and agreed upon by patient and/or caregiver.)    (1.) Jayy Johnson  will move from supine to sit and sit to supine , scoot up and down, and roll side to side with INDEPENDENT within 7 treatment day(s).    (2.) Jayy Johnson will transfer from bed to chair and chair to bed with Contact guard assist using the least restrictive device within 7 treatment day(s).    (3.) Jayy Johnson will ambulate with min assist for 100 feet with the least restrictive device within 7 treatment day(s).   (4.) Jayy Johnson will perform standing static and dynamic balance activities x 5 minutes with min assist to improve safety within 7 treatment day(s).  (5.) Jayy Johnson will ascend and descend 1 stairs using no hand rail(s) with min assist to improve functional mobility and safety within 7 treatment day(s).  (6.) Jayy Johnson will perform therapeutic exercises x 10 min for HEP with SUPERVISION to improve strength, endurance, and functional mobility within 7 treatment day(s).      PHYSICAL THERAPY Initial Assessment  (Link to Caseload Tracking: PT Visit Days : 1  Acknowledge Orders  Time In/Out  PT Charge Capture  Rehab Caseload Tracker    Jayy Johnson is a 84 y.o. male   PRIMARY DIAGNOSIS: ORLANDO (acute kidney injury) ([facility])  Hyperglycemia [R73.9]  Right leg weakness [R29.898]  ORLANDO (acute kidney injury) ([facility]) [N17.9]       Reason for Referral: Difficulty in walking, Not elsewhere classified (R26.2)  Other abnormalities of gait and mobility (R26.89)  Generalized Muscle Weakness (M62.81)  Observation: Payor: MEDICARE / Plan: MEDICARE PART A AND B / Product Type: *No Product type* /     ASSESSMENT:     REHAB RECOMMENDATIONS:   Recommendation to date pending progress:  Setting:  Inpatient Rehab Facility    Equipment:    None     ASSESSMENT:  Mr. Johnson is a pleasant 84 year old male who was recently discharged and now returning to hospital with worsening

## 2024-04-20 NOTE — ICUWATCH
Rapid Response Team Note      Subjective: Responded to Code Stroke secondary to Increasing NIH.    Summary: Pt admitted on 4/19 for weakness, hyponatremia and ORLANDO. Neurology evaluated and deemed pt's neurologic deficits unable to receive thrombolytic therapy. Previous CVA with L-sided deficits. MRI brain completed during dayshift (see results). At the beginning of the shift, pt had mild facial palsy, R-sided leg weakness and ataxia for NIH 3. Hospitalist PA was called to the bedside at midnight when NIH increased to 6 with additional visual disturbances, worsening limb ataxia on R and now L side. EKG, lab work was ordered.    Repeat NIH assessment for 0400 round with increased deficits and CODE S was called. My NIH assessment resulted in NIH of 16. Pt having trouble following commands and with extinction. R-sided visual disturbances and facial palsy. Profound RUE weakness and BLE weakness. See flowsheet NIH.    Dr. Ventura to the bedside. STAT CT head, Tele-neurology consult ordered.    CT Head Impression:  Evolving right frontal infarct with encephalomalacia but no acute intracranial hemorrhage or mass effect. Infarct is in the anterior right MCA distribution.    Tele-Neurology Recommendations:  Not a candidate for thrombolytic therapy. IP Neurology consult.       See Rapid Response/Code Blue Narrator for full documentation    Outcome: Patient to remain in current location. Will follow-up per Critical Care Clinical Rounding protocol.      Vinh Franks RN  [facility]: [phone]  [facility]: [phone]

## 2024-04-20 NOTE — PROGRESS NOTES
This RN in pt room for reassessment of NIH at 0000. Pt preformed NIH with a new onset of LLE weakness. This RN attempted to assist pt to hold left leg in air, when support was taken from leg the patient exhibited a drift. This RN along with Edita BEDOLLA attempted this exercise multiple times, all with the same result exhibiting a new left leg drift. PT had an EKG performed at bedside d/t bradycardia. After the EKG was preformed This RN along with Edita BEDOLLA attempted the NIH once again. The patient gave the wrong age, wrong year, and continued to have the left leg drift upon completion, as well as began slumping over to his right side. PA. Hetal Bustillos was already on the floor and came to evaluate the patient. The PA advised against calling a code S since she was currently examining the patient. PA ordered stat CK and glucose check. Pt left with safety measures in place, and call light within reach. Daughter is at bedside.    Iveth Gutierrez, GRAEME  04/20/24  0140

## 2024-04-21 ENCOUNTER — APPOINTMENT (OUTPATIENT)
Dept: CT IMAGING | Age: 84
DRG: 064 | End: 2024-04-21
Payer: MEDICARE

## 2024-04-21 LAB
ANION GAP SERPL CALC-SCNC: 7 MMOL/L (ref 2–11)
BUN SERPL-MCNC: 32 MG/DL (ref 8–23)
CALCIUM SERPL-MCNC: 8.1 MG/DL (ref 8.3–10.4)
CHLORIDE SERPL-SCNC: 109 MMOL/L (ref 103–113)
CO2 SERPL-SCNC: 22 MMOL/L (ref 21–32)
CREAT SERPL-MCNC: 1.7 MG/DL (ref 0.8–1.5)
ERYTHROCYTE [DISTWIDTH] IN BLOOD BY AUTOMATED COUNT: 13 % (ref 11.9–14.6)
GLUCOSE BLD STRIP.AUTO-MCNC: 140 MG/DL (ref 65–100)
GLUCOSE BLD STRIP.AUTO-MCNC: 151 MG/DL (ref 65–100)
GLUCOSE BLD STRIP.AUTO-MCNC: 202 MG/DL (ref 65–100)
GLUCOSE BLD STRIP.AUTO-MCNC: 233 MG/DL (ref 65–100)
GLUCOSE BLD STRIP.AUTO-MCNC: 283 MG/DL (ref 65–100)
GLUCOSE BLD STRIP.AUTO-MCNC: 335 MG/DL (ref 65–100)
GLUCOSE SERPL-MCNC: 170 MG/DL (ref 65–100)
HCT VFR BLD AUTO: 40.4 % (ref 41.1–50.3)
HGB BLD-MCNC: 13.4 G/DL (ref 13.6–17.2)
MCH RBC QN AUTO: 32.2 PG (ref 26.1–32.9)
MCHC RBC AUTO-ENTMCNC: 33.2 G/DL (ref 31.4–35)
MCV RBC AUTO: 97.1 FL (ref 82–102)
MM INDURATION, POC: 0 MM (ref 0–5)
NRBC # BLD: 0 K/UL (ref 0–0.2)
PLATELET # BLD AUTO: 197 K/UL (ref 150–450)
PMV BLD AUTO: 12.6 FL (ref 9.4–12.3)
POTASSIUM SERPL-SCNC: 4.5 MMOL/L (ref 3.5–5.1)
PPD, POC: NEGATIVE
RBC # BLD AUTO: 4.16 M/UL (ref 4.23–5.6)
SERVICE CMNT-IMP: ABNORMAL
SODIUM SERPL-SCNC: 138 MMOL/L (ref 136–146)
WBC # BLD AUTO: 13 K/UL (ref 4.3–11.1)

## 2024-04-21 PROCEDURE — 85027 COMPLETE CBC AUTOMATED: CPT

## 2024-04-21 PROCEDURE — 96361 HYDRATE IV INFUSION ADD-ON: CPT

## 2024-04-21 PROCEDURE — 80048 BASIC METABOLIC PNL TOTAL CA: CPT

## 2024-04-21 PROCEDURE — 97535 SELF CARE MNGMENT TRAINING: CPT

## 2024-04-21 PROCEDURE — 97530 THERAPEUTIC ACTIVITIES: CPT

## 2024-04-21 PROCEDURE — 6360000002 HC RX W HCPCS: Performed by: NURSE PRACTITIONER

## 2024-04-21 PROCEDURE — 2580000003 HC RX 258: Performed by: INTERNAL MEDICINE

## 2024-04-21 PROCEDURE — 6370000000 HC RX 637 (ALT 250 FOR IP): Performed by: INTERNAL MEDICINE

## 2024-04-21 PROCEDURE — G0378 HOSPITAL OBSERVATION PER HR: HCPCS

## 2024-04-21 PROCEDURE — 70450 CT HEAD/BRAIN W/O DYE: CPT

## 2024-04-21 PROCEDURE — 6370000000 HC RX 637 (ALT 250 FOR IP): Performed by: STUDENT IN AN ORGANIZED HEALTH CARE EDUCATION/TRAINING PROGRAM

## 2024-04-21 PROCEDURE — 97167 OT EVAL HIGH COMPLEX 60 MIN: CPT

## 2024-04-21 PROCEDURE — 36415 COLL VENOUS BLD VENIPUNCTURE: CPT

## 2024-04-21 PROCEDURE — 2580000003 HC RX 258: Performed by: NURSE PRACTITIONER

## 2024-04-21 PROCEDURE — 1100000003 HC PRIVATE W/ TELEMETRY

## 2024-04-21 PROCEDURE — 96375 TX/PRO/DX INJ NEW DRUG ADDON: CPT

## 2024-04-21 PROCEDURE — 6370000000 HC RX 637 (ALT 250 FOR IP): Performed by: NURSE PRACTITIONER

## 2024-04-21 PROCEDURE — 97112 NEUROMUSCULAR REEDUCATION: CPT

## 2024-04-21 PROCEDURE — 82962 GLUCOSE BLOOD TEST: CPT

## 2024-04-21 RX ORDER — LACTOBACILLUS RHAMNOSUS GG 10B CELL
1 CAPSULE ORAL
Status: DISCONTINUED | OUTPATIENT
Start: 2024-04-21 | End: 2024-04-27 | Stop reason: HOSPADM

## 2024-04-21 RX ORDER — INSULIN GLARGINE 100 [IU]/ML
23 INJECTION, SOLUTION SUBCUTANEOUS NIGHTLY
Status: DISCONTINUED | OUTPATIENT
Start: 2024-04-21 | End: 2024-04-22

## 2024-04-21 RX ORDER — PROCHLORPERAZINE EDISYLATE 5 MG/ML
5 INJECTION INTRAMUSCULAR; INTRAVENOUS EVERY 6 HOURS PRN
Status: DISCONTINUED | OUTPATIENT
Start: 2024-04-21 | End: 2024-04-21

## 2024-04-21 RX ORDER — LEVETIRACETAM 500 MG/5ML
500 INJECTION, SOLUTION, CONCENTRATE INTRAVENOUS ONCE
Status: COMPLETED | OUTPATIENT
Start: 2024-04-21 | End: 2024-04-21

## 2024-04-21 RX ORDER — INSULIN LISPRO 100 [IU]/ML
3 INJECTION, SOLUTION INTRAVENOUS; SUBCUTANEOUS
Status: DISCONTINUED | OUTPATIENT
Start: 2024-04-21 | End: 2024-04-22

## 2024-04-21 RX ADMIN — INSULIN LISPRO 8 UNITS: 100 INJECTION, SOLUTION INTRAVENOUS; SUBCUTANEOUS at 17:01

## 2024-04-21 RX ADMIN — SODIUM CHLORIDE: 9 INJECTION, SOLUTION INTRAVENOUS at 21:13

## 2024-04-21 RX ADMIN — ASPIRIN 81 MG: 81 TABLET, COATED ORAL at 08:23

## 2024-04-21 RX ADMIN — INSULIN LISPRO 4 UNITS: 100 INJECTION, SOLUTION INTRAVENOUS; SUBCUTANEOUS at 12:13

## 2024-04-21 RX ADMIN — SODIUM CHLORIDE, PRESERVATIVE FREE 10 ML: 5 INJECTION INTRAVENOUS at 08:25

## 2024-04-21 RX ADMIN — PROCHLORPERAZINE EDISYLATE 5 MG: 5 INJECTION INTRAMUSCULAR; INTRAVENOUS at 09:35

## 2024-04-21 RX ADMIN — LEVETIRACETAM 500 MG: 500 INJECTION, SOLUTION INTRAVENOUS at 10:54

## 2024-04-21 RX ADMIN — DOCUSATE SODIUM 100 MG: 100 CAPSULE, LIQUID FILLED ORAL at 08:19

## 2024-04-21 RX ADMIN — ATORVASTATIN CALCIUM 80 MG: 80 TABLET, FILM COATED ORAL at 21:00

## 2024-04-21 RX ADMIN — DOCUSATE SODIUM 100 MG: 100 CAPSULE, LIQUID FILLED ORAL at 21:00

## 2024-04-21 RX ADMIN — POLYETHYLENE GLYCOL 3350 17 G: 17 POWDER, FOR SOLUTION ORAL at 08:20

## 2024-04-21 RX ADMIN — INSULIN GLARGINE 23 UNITS: 100 INJECTION, SOLUTION SUBCUTANEOUS at 21:00

## 2024-04-21 RX ADMIN — CARBOXYMETHYLCELLULOSE SODIUM 1 DROP: 10 GEL OPHTHALMIC at 20:59

## 2024-04-21 RX ADMIN — INSULIN LISPRO 4 UNITS: 100 INJECTION, SOLUTION INTRAVENOUS; SUBCUTANEOUS at 08:55

## 2024-04-21 RX ADMIN — LEVETIRACETAM 500 MG: 500 TABLET, FILM COATED ORAL at 21:00

## 2024-04-21 RX ADMIN — SODIUM CHLORIDE, PRESERVATIVE FREE 10 ML: 5 INJECTION INTRAVENOUS at 21:01

## 2024-04-21 RX ADMIN — INSULIN LISPRO 4 UNITS: 100 INJECTION, SOLUTION INTRAVENOUS; SUBCUTANEOUS at 20:59

## 2024-04-21 RX ADMIN — RIVAROXABAN 20 MG: 20 TABLET, FILM COATED ORAL at 08:25

## 2024-04-21 RX ADMIN — INSULIN LISPRO 3 UNITS: 100 INJECTION, SOLUTION INTRAVENOUS; SUBCUTANEOUS at 17:01

## 2024-04-21 ASSESSMENT — PAIN SCALES - GENERAL
PAINLEVEL_OUTOF10: 0

## 2024-04-21 NOTE — PROGRESS NOTES
Hospitalist Progress Note   Admit Date:  2024  1:27 PM   Name:  Jayy Johnson   Age:  84 y.o.  Sex:  male  :  1940   MRN:  [id number]   Room:  [facility]/    Presenting/Chief Complaint: Hyperglycemia     Reason(s) for Admission: Hyperglycemia [R73.9]  Right leg weakness [R29.898]  ORLANDO (acute kidney injury) (HCC) [N17.9]     Hospital Course:   Please see admitting H&P for details of presentation.  In brief, Jayy Johnson is a 84 y.o. male with chronic persistent atrial fibrillation (on Xarelto), HTN, DM2, epilepsy and recent cardioembolic CVA presented back to the ER on  with worsening right leg weakness and generalized fatigue after being discharged home on .  He was found w/ ORLANDO and hyponatremia and admitted to the Hospitalist service.  Patient had repeat MRI brains and Neurology recommended mgmt as is done with acute infarcts.  Neurology signed off and requested outpatient follow up.    Of note, recent US abd showed hepatic lesions but daughter told me that they would not plan to seek treatment or further work up for this.    Subjective & 24hr Events:   Patient seen this morning with his daughter Dorothy present.  I reviewed findings and plan.  Patient c/o nausea.  XR abdomen yesterday w/ no obstruction.  Continue IVF and trend BMP in the morning.      Assessment & Plan:     Principal Problem:  ORLANDO (acute kidney injury) (HCC)  Dehydration  Baseline creatinine 1.3-1.4   creatinine 1.8 => up 0.4 in 24-hour period  due to dehydration  Improving w/ IVF  I&O  Hold HCTZ, hold ARB  sCr 1.6 --> 1.7  Trend BMP on      Active Problems:  R leg weakness  suspected progression from previous recent cardioembolic CVA  Neurology evaluated in ER and recommended continue conservative care  Continue ASA and HIS and DOAC  Neurology stating there are acute infarcts, most notable in distal R frontal cortex  Per Neuro: strict glucose control, permissive HTN, continue Keppra, continue DOAC      Hyponatremia  due to dehydration  NS IVF  Normalized on Apr/21     Leukocytosis  R middle lobe pneumonia  CXR on 4/16 with right middle lobe infiltrate => was discharged home on Augmentin  Repeat CXR shows improvement  UA non infectious  Start Levaquin x 3 days since infiltrate was present on prior admission     Atherosclerosis of native coronary artery of native heart with stable angina pectoris (HCC)  No acute chest pain  Continue ASA 81 mg daily  Continue Lipitor 80 mg daily  Holding losartan     Essential hypertension, benign  Permissive HTN for 24 hours per Neurology  Re-introduce home amlodipine on Apr/21  Continue to hold home losartan and HCTZ     Type 2 diabetes mellitus (HCC)  A1c 9.3  Hold glipizide in the setting of ORLANDO  Strict glucose control <180 while inpatient per Neurology  Increase Lantus to 23 u on Apr/21  Adjust correction scale on Apr/20  Consulted DM educator     Chronic atrial fibrillation ([facility])  Patient currently in atrial fibrillation  Please see Dr. Canas's note dated Apr/08  Patient is to be on apixaban 2.5 mg BID per Cardiologist and family     Epilepsy ([facility])  Continue Keppra 500 mg twice daily     History of cardioembolic cerebrovascular accident (CVA)  Patient with recent cardioembolic CVA  Continue ASA 81 mg daily  Start renal adjusted apixaban on Apr/22  PT/OT      Anticipated Discharge Arrangements:   Therapy has not evaluated yet    PT/OT evals and PPD ordered?  Therapy and PPD  Diet:  ADULT DIET; Regular; 3 carb choices (45 gm/meal)  VTE prophylaxis:  rivaroxaban  Code status: Full Code      Non-peripheral Lines and Tubes (if present):      Urinary Catheter 04/19/24 (Active)          Telemetry (if present):  Cardiac/Telemetry Monitor On: Portable telemetry pack applied        Hospital Problems:  Principal Problem:    ORLANDO (acute kidney injury) ([facility])  Active Problems:    Chronic atrial fibrillation ([facility])    Dyslipidemia    Atherosclerosis of native coronary artery of native

## 2024-04-21 NOTE — ICUWATCH
RRT Clinical Rounding Nurse Update    Vitals:    04/21/24 0800 04/21/24 0944 04/21/24 1200 04/21/24 1549   BP: (!) 159/84  133/64 (!) 158/69   Pulse: 85  85 81   Resp: 18  18 18   Temp: 99.7 °F (37.6 °C) 99.7 °F (37.6 °C) 99.9 °F (37.7 °C) 98.1 °F (36.7 °C)   TempSrc: Oral Oral Axillary Axillary   SpO2: 93%  94% 96%   Weight:       Height:            ASSESSMENT:  Previous outreach assessment was reviewed. More alert this afternoon. Feeding self using left hand with assistance from family. Some effort against gravity with R arm.     PLAN:  {outreach followup:19778}    Iván Townsend RN  [facility]: [phone]  [facility]: [phone]

## 2024-04-21 NOTE — ICUWATCH
RRT Clinical Rounding Nurse Update    Vitals:    04/20/24 1200 04/20/24 1600 04/20/24 2000 04/21/24 0000   BP: (!) 141/62 (!) 148/80 139/89 (!) 142/73   Pulse: 80 53 (!) 40 (!) 43   Resp: 16 18 15 16   Temp: 98.1 °F (36.7 °C) 97.9 °F (36.6 °C) 99.3 °F (37.4 °C) 97.9 °F (36.6 °C)   TempSrc: Oral Oral Oral    SpO2: 96% 95% 96% 94%   Weight:       Height:            DETERIORATION INDEX SCORE: 37    ASSESSMENT:  Previous outreach assessment was reviewed. There have been no significant changes since previous assessment.    PLAN:  Will follow per RRT Clinical Rounding Program protocol.    Kim Trevino RN  [facility]: [phone]  [facility]: [phone]

## 2024-04-21 NOTE — PROGRESS NOTES
ACUTE OCCUPATIONAL THERAPY GOALS:   (Developed with and agreed upon by patient and/or caregiver.)  1. Patient will feed self entire meal with either and adaptive utensils as needed.   2. Patient will complete upper body dressing and bathing with mod A and adaptive equipment as needed.   3. Patient will participate in BUE therapeutic exercises to increase strength in R UEs to at least 3/5 for participation in ADLs and functional transfers.   4. Patient will participate in BUE therapeutic activities to increase coordination in R UE to WFL for bimanual fine motor ADLs.   5. Patient will attend to R and L side for 100% of treatment session with less than 2 verbal cues from therapist.   6. Patient will complete functional transfers with mod A and adaptive equipment as needed.   7. Patient will complete grooming with mod A in supported sitting at sink level with mod verbal cues.    Timeframe: 7 visits    OCCUPATIONAL THERAPY Initial Assessment, Daily Note, and PM       OT Visit Days: 1  Acknowledge Orders  Time  OT Charge Capture  Rehab Caseload Tracker      Jayy Johnson is a 84 y.o. male   PRIMARY DIAGNOSIS: ORLANDO (acute kidney injury) ([facility])  Hyperglycemia [R73.9]  Right leg weakness [R29.898]  ORLANDO (acute kidney injury) ([facility]) [N17.9]       Reason for Referral: Generalized Muscle Weakness (M62.81)  Other lack of cordination (R27.8)  Difficulty in walking, Not elsewhere classified (R26.2)  Other abnormalities of gait and mobility (R26.89)  Dizziness and Giddiness (R42)  Unspecified Lack of Coordination (R27.9)  Hemiplegia and hemiparesis following cerebral infarction affecting right dominant side (I69.351)  Inpatient: Payor: MEDICARE / Plan: MEDICARE PART A AND B / Product Type: *No Product type* /     ASSESSMENT:     REHAB RECOMMENDATIONS:   Recommendation to date pending progress:  Setting:  Inpatient Rehab Facility     Equipment:    To Be Determined     ASSESSMENT:  Mr. Johnson is a 83 YO right hand dominant WM  usage of affected upper extremity, and prepare for self care..   Self Care (15 minutes): Patient participated in upper body dressing, lower body dressing, self feeding, and grooming ADLs in supported sitting, unsupported sitting, standing, and supine with maximal visual, verbal, manual, and tactile cueing to increase independence, decrease assistance required, increase activity tolerance, increase safety awareness, and maintain precautions. Patient also participated in functional mobility, energy conservation, and adaptive equipment training to increase independence, decrease assistance required, increase activity tolerance, increase safety awareness, and maintain precautions.     TREATMENT GRID:  N/A    AFTER TREATMENT PRECAUTIONS: Alarm Activated, Bed, Bed/Chair Locked, Call light within reach, Heels floated, Needs within reach, RN notified, Side rails x3, and Visitors at bedside    INTERDISCIPLINARY COLLABORATION:  RN/ PCT, PT/ PTA, OT/ MCFADDEN, and RN Case Manager/      EDUCATION:  Education Given To: Patient;Family;Staff  Education Provided: Role of Therapy;Plan of Care;Home Exercise Program;Precautions;ADL Adaptive Strategies;Transfer Training;Energy Conservation;Orientation;IADL Safety;Family Education;Equipment;Low Vision Education;Fall Prevention Strategies  Education Provided Comments: importance of not feeding pt unless he is awake and alert, upright posture  Education Method: Demonstration;Verbal  Barriers to Learning: Cognition  Education Outcome: Continued education needed;Unable to demonstrate understanding;Unable to verbalize    TOTAL TREATMENT DURATION AND TIME:  Time In: 1245  Time Out: 1315  Minutes: 30    JOSE LUIS PERALTA, OT   Jose Luis Peralta, MS, OTR/L

## 2024-04-21 NOTE — ICUWATCH
RRT Clinical Rounding Nurse Update    Vitals:    04/20/24 0541 04/20/24 0800 04/20/24 1200 04/20/24 1600   BP: (!) 148/82 (!) 137/102 (!) 141/62 (!) 148/80   Pulse: 62 69 80 53   Resp: 14 16 16 18   Temp: 98.6 °F (37 °C) 98.4 °F (36.9 °C) 98.1 °F (36.7 °C) 97.9 °F (36.6 °C)   TempSrc: Oral Oral Oral Oral   SpO2: 97% 96% 96% 95%   Weight:       Height:            DETERIORATION INDEX SCORE: 37    ASSESSMENT:  Previous outreach assessment was reviewed. There have been no significant changes since previous assessment.Pt resting comfortably in bed. No complaints at this time. Family member at bedside. Glucose remains in 200-300 range but insulin adjustments made per day shift team. Recent labs, notes, and chart reviewed. Vital signs stable, no acute distress.      PLAN:  Will follow per RRT Clinical Rounding Program protocol.    Kim Trevino RN  [facility]: [phone]  [facility]: [phone]

## 2024-04-21 NOTE — ACP (ADVANCE CARE PLANNING)
Advance Care Planning   Healthcare Decision Maker:    Secondary Decision Maker: Arielle Rodríguez - Child - [phone]    Pt is a full code.  Health Care POA on file.    Click here to complete Healthcare Decision Makers including selection of the Healthcare Decision Maker Relationship (ie \"Primary\").

## 2024-04-21 NOTE — CARE COORDINATION
Pt chart reviewed for discharge planning. Pt is knonw to CM staff from recent admission and D/C home with [facility].  CM met with pt and daughter at bedside, verified demographic information/health insurance. Pt lives with spouse and daughters are also frequently in the home for support as needed. . PCP was confirmed, last seen 8/14/2023 but has had several specialist visits more recently.  Pt has been seen by therapy and recommended for IRC. D[facility].  CM will follow pt plan of care and assist further with supportive care referrals pending pt clinical progress.  Please consult case management if addditional needs arise.      04/21/24 1145   Service Assessment   Patient Orientation Alert and Oriented   Cognition Alert   History Provided By Patient;Child/Family;Medical Record   Primary Caregiver Spouse   Accompanied By/Relationship daughter   Support Systems Spouse/Significant Other;Children   Patient's Healthcare Decision Maker is: Legal Next of Kin   PCP Verified by CM Yes   Last Visit to PCP Within last 6 months  (8/14/2023)   Prior Functional Level Assistance with the following:;Mobility   Current Functional Level Assistance with the following:;Mobility   Can patient return to prior living arrangement Other (see comment)  (rehab recommended)   Ability to make needs known: Good   Family able to assist with home care needs: Yes   Would you like for me to discuss the discharge plan with any other family members/significant others, and if so, who? Yes   Financial Resources Medicare   Community Resources ECF/Home Care  (recent referral to [facility])   CM/SW Referral Other (see comment)  (rehab)   Social/Functional History   Lives With Spouse   Type of Home House   Occupation Retired   Discharge Planning   Type of Residence House   Living Arrangements Spouse/Significant Other   Current Services Prior To Admission Home Care   Potential Assistance Needed Other (Comment)  (In Pt

## 2024-04-21 NOTE — PROGRESS NOTES
.Occupational Therapy Note:    Attempted to see patient this AM for occupational therapy evaluation session. Patient had gotten medication for nausea and OT was unable to awaken pt. Daughter suggested coming back after lunch as pt loves to eat. Will follow and re-attempt as schedule permits/patient available. Thank you,    JOSE LUIS PERALTA, OT    Rehab Caseload Tracker

## 2024-04-21 NOTE — PROGRESS NOTES
Upon morning medication pass, pt complaining of nausea, continuously voicing \"I need to throw up.\" Per daughter at bedside, pt received dose of nausea medication Friday morning (see MAR) & it \"knocked patient out.\" Family voiced concern that if pt remains sick he may not be able to keep oral medications down. Hospitalist notified & new order received for IV compazine. IV compazine administered per RN & within 5 minutes of receiving compazine patient barely arouseable to sternal rub. AM dose of keppra given IV instead of oral.     1400: Pt still remains very lethargic & not following commands well. NIH/neuro assessments requiring repeated encouragement to participate. Hospitalist notified & order received for repeat CT head.    1600: NIH increase from 19 to 23. On command pt will not hold up left arm, when held up by RN pt lets arm fall to bed with no efforts to hold up hisself. Previously pt had been scoring a 0 to LUE and now scoring a 3 on NIHSS. Pt remains with moderate  strength in both hands. Patient deterioration index now alerting high risk. Hospitalist notified, no code S called. Repeat CT head without acute changes.

## 2024-04-21 NOTE — ICUWATCH
RRT Clinical Rounding Nurse Update    Vitals:    04/21/24 0000 04/21/24 0400 04/21/24 0800 04/21/24 0944   BP: (!) 142/73 (!) 147/63 (!) 159/84    Pulse: (!) 43 (!) 102 85    Resp: 16 15 18    Temp: 97.9 °F (36.6 °C) 98.1 °F (36.7 °C) 99.7 °F (37.6 °C) 99.7 °F (37.6 °C)   TempSrc:  Oral Oral Oral   SpO2: 94%  93%    Weight:       Height:            DETERIORATION INDEX SCORE: 40    ASSESSMENT:  Previous outreach assessment was reviewed.  Patient currently sleeping. Lethargic since receiving compazine earlier per primary RN. Family at bedside reports R sided weakness worse today prior to receiving compazine. VS, labs, and progress notes reviewed.     PLAN:  Will follow per RRT Clinical Rounding Program protocol.    Iván Townsend RN  [facility]: [phone]  [facility]: [phone]

## 2024-04-21 NOTE — PLAN OF CARE
Problem: Safety - Adult  Goal: Free from fall injury  4/20/2024 2207 by Iveth Gutierrez RN  Outcome: Progressing  Flowsheets (Taken 4/20/2024 2000)  Free From Fall Injury: Instruct family/caregiver on patient safety  4/20/2024 1046 by Vini Ibrahim RN  Outcome: Progressing     Problem: Discharge Planning  Goal: Discharge to home or other facility with appropriate resources  4/20/2024 2207 by Iveth Gutierrez RN  Outcome: Progressing  Flowsheets (Taken 4/20/2024 2000)  Discharge to home or other facility with appropriate resources: Identify barriers to discharge with patient and caregiver  4/20/2024 1046 by Vini Ibrahim RN  Outcome: Progressing     Problem: Pain  Goal: Verbalizes/displays adequate comfort level or baseline comfort level  4/20/2024 2207 by Iveth Gutierrez RN  Outcome: Progressing  4/20/2024 1046 by Vini Ibrahim RN  Outcome: Progressing     Problem: Skin/Tissue Integrity  Goal: Absence of new skin breakdown  Description: 1.  Monitor for areas of redness and/or skin breakdown  2.  Assess vascular access sites hourly  3.  Every 4-6 hours minimum:  Change oxygen saturation probe site  4.  Every 4-6 hours:  If on nasal continuous positive airway pressure, respiratory therapy assess nares and determine need for appliance change or resting period.  4/20/2024 2207 by Iveth Gutierrez RN  Outcome: Progressing  4/20/2024 1046 by Vini Ibrahim RN  Outcome: Progressing     Problem: Neurosensory - Adult  Goal: Achieves stable or improved neurological status  4/20/2024 2207 by Iveth Gutierrez RN  Outcome: Progressing  4/20/2024 1046 by Vini Ibrahim RN  Outcome: Progressing     Problem: Respiratory - Adult  Goal: Achieves optimal ventilation and oxygenation  4/20/2024 2207 by Iveth Gutierrez RN  Outcome: Progressing  4/20/2024 1046 by Vini Ibrahim RN  Outcome: Progressing     Problem: Cardiovascular - Adult  Goal: Maintains optimal cardiac output and hemodynamic stability  4/20/2024 2207 by Iveth Gutierrez

## 2024-04-21 NOTE — PROGRESS NOTES
ACUTE PHYSICAL THERAPY GOALS:   (Developed with and agreed upon by patient and/or caregiver.)  (1.) Jayy Johnson  will move from supine to sit and sit to supine , scoot up and down, and roll side to side with INDEPENDENT within 7 treatment day(s).    (2.) Jayy Johnson will transfer from bed to chair and chair to bed with Contact guard assist using the least restrictive device within 7 treatment day(s).    (3.) Jayy Johnson will ambulate with min assist for 100 feet with the least restrictive device within 7 treatment day(s).   (4.) Jayy Johnson will perform standing static and dynamic balance activities x 5 minutes with min assist to improve safety within 7 treatment day(s).  (5.) Jayy Johnson will ascend and descend 1 stairs using no hand rail(s) with min assist to improve functional mobility and safety within 7 treatment day(s).  (6.) Jayy Johnson will perform therapeutic exercises x 10 min for HEP with SUPERVISION to improve strength, endurance, and functional mobility within 7 treatment day(s).      PHYSICAL THERAPY: Daily Note PM   (Link to Caseload Tracking: PT Visit Days : 2  Time In/Out PT Charge Capture  Rehab Caseload Tracker  Orders    Jayy Johnson is a 84 y.o. male   PRIMARY DIAGNOSIS: [place]LANDO (acute kidney injury) ([facility])  Hyperglycemia [R73.9]  Right leg weakness [R29.898]  [place]O (acute kidney injury) ([facility]) [N17.9]       Inpatient: Payor: MEDICARE / Plan: MEDICARE PART A AND B / Product Type: *No Product type* /     ASSESSMENT:     REHAB RECOMMENDATIONS:   Recommendation to date pending progress:  Setting:  Inpatient Rehab Facility    Equipment:    To Be Determined     ASSESSMENT:  Mr. Johnson presents supine on contact with daughter at bedside. Saw pt with OT today for therapy session. Pt resting with eyes closed, daughter reports she fed him his lunch. OT educated on feeing safety, daughter verbalizes understanding. Pt drowsy and lethargic but would open eyes and respond  intermittently. He is very slow to process. When eyes open nystagmus observed. Very limited command following and very little intentional movement. Increased right flexor tone in lower extremity. Pt was total assist of 2 supine to sit and scooting to edge of bed. Worked together with OT on sitting balance, joint approximation, and activity tolerance. Pt with poor sitting balance and needed mod/max assist at all times to maintain sitting. He tends to lean heavily to the right and posterior. Daughter states that patient seems worse today to her than he did yesterday. Did attempt sit to stand 3 times. First attempt right knee went into flexor tone and he did not weight bear, sat down and tried again. Able to just clear his bottom off the bed. Tried one more time and able to get linen under him. Pt was total assist of 2 to return to supine and positioned in supine with needs in reach and daughter tending to patient and needs in reach. Pt needing a lot more assist today than yesterday, decline in his functional mobility. Will continue to follow to address his deficits.      SUBJECTIVE:   Mr. Johnson states, \"I hear you\"     Social/Functional Lives With: Spouse  Type of Home: House  Home Layout: One level  Home Access: Stairs to enter with rails  Entrance Stairs - Number of Steps: 1  Home Equipment: Cane, Walker, rolling  Ambulation Assistance: Independent  Transfer Assistance: Independent  Occupation: Retired  Additional Comments: Patient was indep at baseline with transfers and ambulation  OBJECTIVE:     PAIN: VITALS / O2: PRECAUTION / LINES / DRAINS:   Pre Treatment:   Pain Assessment: None - Denies Pain      Post Treatment: denies pain Vitals        Oxygen on room air    Alfonso Catheter, IV, and Telemetry     RESTRICTIONS/PRECAUTIONS:        MOBILITY: I Mod I S SBA CGA Min Mod Max Total  NT x2 Comments:   Bed Mobility    Rolling [] [] [] [] [] [] [] [] [] [] []    Supine to Sit [] [] [] [] [] [] [] [] [x] [] [x]     Scooting [] [] [] [] [] [] [] [] [x] [] [x]    Sit to Supine [] [] [] [] [] [] [] [] [x] [] [x]    Transfers    Sit to Stand [] [] [] [] [] [] [] [] [x] [] [x] Unable to achieve full upright standing   Bed to Chair [] [] [] [] [] [] [] [] [] [] []    Stand to Sit [] [] [] [] [] [] [] [] [] [] []     [] [] [] [] [] [] [] [] [] [] []    I=Independent, Mod I=Modified Independent, S=Supervision, SBA=Standby Assistance, CGA=Contact Guard Assistance,   Min=Minimal Assistance, Mod=Moderate Assistance, Max=Maximal Assistance, Total=Total Assistance, NT=Not Tested    BALANCE: Good Fair+ Fair Fair- Poor NT Comments   Sitting Static [] [] [] [] [x] []    Sitting Dynamic [] [] [] [] [x] []              Standing Static [] [] [] [] [x] [] Unable to achiever upright stand   Standing Dynamic [] [] [] [] [] [x]      GAIT: I Mod I S SBA CGA Min Mod Max Total  NT x2 Comments:   Level of Assistance [] [] [] [] [] [] [] [] [] [x] []    Distance       DME N/A    Gait Quality N/A    Weightbearing Status      Stairs      I=Independent, Mod I=Modified Independent, S=Supervision, SBA=Standby Assistance, CGA=Contact Guard Assistance,   Min=Minimal Assistance, Mod=Moderate Assistance, Max=Maximal Assistance, Total=Total Assistance, NT=Not Tested    PLAN:   FREQUENCY AND DURATION: 3 times/week for duration of hospital stay or until stated goals are met, whichever comes first.    TREATMENT:   TREATMENT:   Co-Treatment PT/OT necessary due to patient's decreased overall endurance/tolerance levels, as well as need for high level skilled assistance to complete functional transfers/mobility and functional tasks  Therapeutic Activity (30 Minutes): Therapeutic activity included Supine to Sit, Sit to Supine, Scooting, and Sitting balance  to improve functional Activity tolerance, Balance, Coordination, Mobility, and Strength.    TREATMENT GRID:  N/A    AFTER TREATMENT PRECAUTIONS: Alarm Activated, Bed, Bed/Chair Locked, Call light within reach, and

## 2024-04-22 ENCOUNTER — APPOINTMENT (OUTPATIENT)
Dept: CT IMAGING | Age: 84
DRG: 064 | End: 2024-04-22
Payer: MEDICARE

## 2024-04-22 PROBLEM — R11.0 NAUSEA: Status: ACTIVE | Noted: 2024-04-22

## 2024-04-22 PROBLEM — D73.89 LESION OF SPLEEN: Status: ACTIVE | Noted: 2024-04-22

## 2024-04-22 PROBLEM — K86.89 PANCREATIC MASS: Status: ACTIVE | Noted: 2024-04-22

## 2024-04-22 PROBLEM — N28.1 CYST OF RIGHT KIDNEY: Status: ACTIVE | Noted: 2024-04-22

## 2024-04-22 LAB
ANION GAP SERPL CALC-SCNC: 5 MMOL/L (ref 2–11)
BUN SERPL-MCNC: 31 MG/DL (ref 8–23)
CALCIUM SERPL-MCNC: 8.4 MG/DL (ref 8.3–10.4)
CHLORIDE SERPL-SCNC: 110 MMOL/L (ref 103–113)
CO2 SERPL-SCNC: 22 MMOL/L (ref 21–32)
CREAT SERPL-MCNC: 1.6 MG/DL (ref 0.8–1.5)
CRP SERPL-MCNC: 12 MG/DL (ref 0–0.9)
ERYTHROCYTE [DISTWIDTH] IN BLOOD BY AUTOMATED COUNT: 12.9 % (ref 11.9–14.6)
ERYTHROCYTE [SEDIMENTATION RATE] IN BLOOD: 28 MM/HR
GLUCOSE BLD STRIP.AUTO-MCNC: 163 MG/DL (ref 65–100)
GLUCOSE BLD STRIP.AUTO-MCNC: 201 MG/DL (ref 65–100)
GLUCOSE BLD STRIP.AUTO-MCNC: 221 MG/DL (ref 65–100)
GLUCOSE BLD STRIP.AUTO-MCNC: 261 MG/DL (ref 65–100)
GLUCOSE BLD STRIP.AUTO-MCNC: 322 MG/DL (ref 65–100)
GLUCOSE SERPL-MCNC: 185 MG/DL (ref 65–100)
HCT VFR BLD AUTO: 40.8 % (ref 41.1–50.3)
HGB BLD-MCNC: 13.5 G/DL (ref 13.6–17.2)
MCH RBC QN AUTO: 32.5 PG (ref 26.1–32.9)
MCHC RBC AUTO-ENTMCNC: 33.1 G/DL (ref 31.4–35)
MCV RBC AUTO: 98.1 FL (ref 82–102)
NRBC # BLD: 0 K/UL (ref 0–0.2)
PLATELET # BLD AUTO: 199 K/UL (ref 150–450)
PMV BLD AUTO: 12.6 FL (ref 9.4–12.3)
POTASSIUM SERPL-SCNC: 4.3 MMOL/L (ref 3.5–5.1)
RBC # BLD AUTO: 4.16 M/UL (ref 4.23–5.6)
SERVICE CMNT-IMP: ABNORMAL
SODIUM SERPL-SCNC: 137 MMOL/L (ref 136–146)
URATE SERPL-MCNC: 5.7 MG/DL (ref 2.6–6)
WBC # BLD AUTO: 14.2 K/UL (ref 4.3–11.1)

## 2024-04-22 PROCEDURE — 76937 US GUIDE VASCULAR ACCESS: CPT

## 2024-04-22 PROCEDURE — 2580000003 HC RX 258: Performed by: NURSE PRACTITIONER

## 2024-04-22 PROCEDURE — 80048 BASIC METABOLIC PNL TOTAL CA: CPT

## 2024-04-22 PROCEDURE — 87040 BLOOD CULTURE FOR BACTERIA: CPT

## 2024-04-22 PROCEDURE — 2580000003 HC RX 258: Performed by: INTERNAL MEDICINE

## 2024-04-22 PROCEDURE — 6370000000 HC RX 637 (ALT 250 FOR IP): Performed by: NURSE PRACTITIONER

## 2024-04-22 PROCEDURE — 6360000004 HC RX CONTRAST MEDICATION: Performed by: PHYSICIAN ASSISTANT

## 2024-04-22 PROCEDURE — 6370000000 HC RX 637 (ALT 250 FOR IP): Performed by: PHYSICIAN ASSISTANT

## 2024-04-22 PROCEDURE — 94640 AIRWAY INHALATION TREATMENT: CPT

## 2024-04-22 PROCEDURE — 94760 N-INVAS EAR/PLS OXIMETRY 1: CPT

## 2024-04-22 PROCEDURE — 36415 COLL VENOUS BLD VENIPUNCTURE: CPT

## 2024-04-22 PROCEDURE — 6370000000 HC RX 637 (ALT 250 FOR IP): Performed by: INTERNAL MEDICINE

## 2024-04-22 PROCEDURE — 85652 RBC SED RATE AUTOMATED: CPT

## 2024-04-22 PROCEDURE — 51702 INSERT TEMP BLADDER CATH: CPT

## 2024-04-22 PROCEDURE — 1100000000 HC RM PRIVATE

## 2024-04-22 PROCEDURE — 85027 COMPLETE CBC AUTOMATED: CPT

## 2024-04-22 PROCEDURE — 86140 C-REACTIVE PROTEIN: CPT

## 2024-04-22 PROCEDURE — 84550 ASSAY OF BLOOD/URIC ACID: CPT

## 2024-04-22 PROCEDURE — 99221 1ST HOSP IP/OBS SF/LOW 40: CPT | Performed by: STUDENT IN AN ORGANIZED HEALTH CARE EDUCATION/TRAINING PROGRAM

## 2024-04-22 PROCEDURE — 51798 US URINE CAPACITY MEASURE: CPT

## 2024-04-22 PROCEDURE — 74177 CT ABD & PELVIS W/CONTRAST: CPT

## 2024-04-22 PROCEDURE — 82962 GLUCOSE BLOOD TEST: CPT

## 2024-04-22 RX ORDER — INSULIN GLARGINE 100 [IU]/ML
20 INJECTION, SOLUTION SUBCUTANEOUS NIGHTLY
Status: DISCONTINUED | OUTPATIENT
Start: 2024-04-22 | End: 2024-04-23

## 2024-04-22 RX ORDER — ALBUTEROL SULFATE 90 UG/1
2 AEROSOL, METERED RESPIRATORY (INHALATION) EVERY 6 HOURS PRN
Status: DISCONTINUED | OUTPATIENT
Start: 2024-04-22 | End: 2024-04-27 | Stop reason: HOSPADM

## 2024-04-22 RX ORDER — PROMETHAZINE HYDROCHLORIDE 25 MG/1
12.5 TABLET ORAL EVERY 6 HOURS PRN
Status: DISCONTINUED | OUTPATIENT
Start: 2024-04-22 | End: 2024-04-27 | Stop reason: HOSPADM

## 2024-04-22 RX ORDER — INSULIN LISPRO 100 [IU]/ML
0-8 INJECTION, SOLUTION INTRAVENOUS; SUBCUTANEOUS
Status: DISCONTINUED | OUTPATIENT
Start: 2024-04-22 | End: 2024-04-27 | Stop reason: HOSPADM

## 2024-04-22 RX ORDER — INSULIN LISPRO 100 [IU]/ML
0-4 INJECTION, SOLUTION INTRAVENOUS; SUBCUTANEOUS NIGHTLY
Status: DISCONTINUED | OUTPATIENT
Start: 2024-04-22 | End: 2024-04-27 | Stop reason: HOSPADM

## 2024-04-22 RX ORDER — INSULIN LISPRO 100 [IU]/ML
6 INJECTION, SOLUTION INTRAVENOUS; SUBCUTANEOUS
Status: DISCONTINUED | OUTPATIENT
Start: 2024-04-22 | End: 2024-04-23

## 2024-04-22 RX ORDER — COLCHICINE 0.6 MG/1
0.6 TABLET ORAL DAILY
Status: COMPLETED | OUTPATIENT
Start: 2024-04-22 | End: 2024-04-24

## 2024-04-22 RX ADMIN — SODIUM CHLORIDE: 9 INJECTION, SOLUTION INTRAVENOUS at 21:16

## 2024-04-22 RX ADMIN — INSULIN LISPRO 4 UNITS: 100 INJECTION, SOLUTION INTRAVENOUS; SUBCUTANEOUS at 21:18

## 2024-04-22 RX ADMIN — DOCUSATE SODIUM 100 MG: 100 CAPSULE, LIQUID FILLED ORAL at 21:18

## 2024-04-22 RX ADMIN — APIXABAN 2.5 MG: 2.5 TABLET, FILM COATED ORAL at 08:54

## 2024-04-22 RX ADMIN — INSULIN LISPRO 6 UNITS: 100 INJECTION, SOLUTION INTRAVENOUS; SUBCUTANEOUS at 12:50

## 2024-04-22 RX ADMIN — SODIUM CHLORIDE, PRESERVATIVE FREE 10 ML: 5 INJECTION INTRAVENOUS at 09:02

## 2024-04-22 RX ADMIN — Medication 1 CAPSULE: at 08:54

## 2024-04-22 RX ADMIN — SODIUM CHLORIDE: 9 INJECTION, SOLUTION INTRAVENOUS at 09:33

## 2024-04-22 RX ADMIN — LEVOFLOXACIN 750 MG: 500 TABLET, FILM COATED ORAL at 09:00

## 2024-04-22 RX ADMIN — POLYETHYLENE GLYCOL 3350 17 G: 17 POWDER, FOR SOLUTION ORAL at 09:00

## 2024-04-22 RX ADMIN — DOCUSATE SODIUM 100 MG: 100 CAPSULE, LIQUID FILLED ORAL at 08:54

## 2024-04-22 RX ADMIN — DIATRIZOATE MEGLUMINE AND DIATRIZOATE SODIUM 15 ML: 660; 100 LIQUID ORAL; RECTAL at 09:50

## 2024-04-22 RX ADMIN — ALBUTEROL SULFATE 2 PUFF: 90 AEROSOL, METERED RESPIRATORY (INHALATION) at 14:18

## 2024-04-22 RX ADMIN — COLCHICINE 0.6 MG: 0.6 TABLET, FILM COATED ORAL at 15:36

## 2024-04-22 RX ADMIN — LEVETIRACETAM 500 MG: 500 TABLET, FILM COATED ORAL at 08:54

## 2024-04-22 RX ADMIN — ATORVASTATIN CALCIUM 80 MG: 80 TABLET, FILM COATED ORAL at 21:17

## 2024-04-22 RX ADMIN — INSULIN LISPRO 6 UNITS: 100 INJECTION, SOLUTION INTRAVENOUS; SUBCUTANEOUS at 17:54

## 2024-04-22 RX ADMIN — APIXABAN 2.5 MG: 2.5 TABLET, FILM COATED ORAL at 21:18

## 2024-04-22 RX ADMIN — IOPAMIDOL 100 ML: 755 INJECTION, SOLUTION INTRAVENOUS at 11:56

## 2024-04-22 RX ADMIN — SODIUM CHLORIDE, PRESERVATIVE FREE 10 ML: 5 INJECTION INTRAVENOUS at 21:12

## 2024-04-22 RX ADMIN — AMLODIPINE BESYLATE 5 MG: 5 TABLET ORAL at 08:54

## 2024-04-22 RX ADMIN — LEVETIRACETAM 500 MG: 500 TABLET, FILM COATED ORAL at 21:17

## 2024-04-22 RX ADMIN — INSULIN GLARGINE 20 UNITS: 100 INJECTION, SOLUTION SUBCUTANEOUS at 21:19

## 2024-04-22 RX ADMIN — INSULIN LISPRO 6 UNITS: 100 INJECTION, SOLUTION INTRAVENOUS; SUBCUTANEOUS at 09:01

## 2024-04-22 RX ADMIN — INSULIN LISPRO 2 UNITS: 100 INJECTION, SOLUTION INTRAVENOUS; SUBCUTANEOUS at 12:50

## 2024-04-22 RX ADMIN — INSULIN LISPRO 4 UNITS: 100 INJECTION, SOLUTION INTRAVENOUS; SUBCUTANEOUS at 17:54

## 2024-04-22 RX ADMIN — ASPIRIN 81 MG: 81 TABLET, COATED ORAL at 08:54

## 2024-04-22 ASSESSMENT — PAIN SCALES - GENERAL
PAINLEVEL_OUTOF10: 0

## 2024-04-22 NOTE — PROGRESS NOTES
Physician Progress Note      PATIENT:               RUTHIE DUMONT  [facility] #:                  [id number]  :                       1940  ADMIT DATE:       2024 1:27 PM  DISCH DATE:  RESPONDING  PROVIDER #:        ERICH SHELTON          QUERY TEXT:    Patient admitted with right leg weakness, noted to have chronic atrial   fibrillation and is maintained on Eliquis. If possible, please document in   progress notes and discharge summary if you are evaluating and/or treating any   of the following:    The medical record reflects the following:  Risk Factors: 84 yr, HTN, Afib, CVA, DM  Clinical Indicators: EKG shows Afib  Treatment: Dionicio [email or web address]  Options provided:  -- Secondary hypercoagulable state in a patient with atrial fibrillation  -- Other - I will add my own diagnosis  -- Disagree - Not applicable / Not valid  -- Disagree - Clinically unable to determine / Unknown  -- Refer to Clinical Documentation Reviewer    PROVIDER RESPONSE TEXT:    This patient has secondary hypercoagulable state in a patient with atrial   fibrillation.    Query created by: JIM DELA CRUZ on 2024 10:50 AM      Electronically signed by:  ERICH SHELTON 2024 11:57 AM

## 2024-04-22 NOTE — PROGRESS NOTES
Hospitalist Progress Note   Admit Date:  2024  1:27 PM   Name:  Jayy Johnson   Age:  84 y.o.  Sex:  male  :  1940   MRN:  [id number]   Room:  [facility]/    Presenting/Chief Complaint: Hyperglycemia     Reason(s) for Admission: Hyperglycemia [R73.9]  Right leg weakness [R29.898]  ORLANDO (acute kidney injury) (HCC) [N17.9]     Hospital Course:   Please see admitting H&P for details of presentation.  In brief, Jayy Johnson is a 84 y.o. male with chronic persistent atrial fibrillation (on Xarelto), HTN, DM2, epilepsy and recent cardioembolic CVA presented back to the ER on  with worsening right leg weakness and generalized fatigue after being discharged home on .  He was found w/ ORLANDO and hyponatremia and admitted to the Hospitalist service.  Patient had repeat MRI brains and Neurology recommended mgmt as is done with acute infarcts.  Neurology signed off and requested outpatient follow up.    Subjective & 24hr Events:   Patient seen this morning with his daughter Dorothy present.  Continues to complain of nausea, was given Compazine and it caused him to be very sedated.  A CT abdomen pelvis today was pursued which showed a pancreatic mass, splenic lesions and a kidney lesion that were all nonspecific.  Prior documentation showed that they found at least liver masses on ultrasound but this did not want to be worked up further.  He has been complaining of right elbow pain and hand pain.  He does have arthritis of his right hand.  His elbow is more tender.  Not super erythematous.  Has been on Levaquin so less concerned about cellulitis.  Asked if he had a history of gout and to his daughter's knowledge it was a no.   Inpatient rehab is being recommended but I am not sure if he can tolerate 3 hours of therapy    Assessment & Plan:     ORLANDO (acute kidney injury) (HCC)  Dehydration  Baseline creatinine 1.3-1.4   creatinine 1.8 => up 0.4 in 24-hour period  due to dehydration  Improving w/  disintegrating tablet 4 mg  4 mg Oral Q8H PRN    Or    ondansetron (ZOFRAN) injection 4 mg  4 mg IntraVENous Q6H PRN    bisacodyl (DULCOLAX) suppository 10 mg  10 mg Rectal Daily PRN    glucose chewable tablet 16 g  4 tablet Oral PRN    dextrose bolus 10% 125 mL  125 mL IntraVENous PRN    Or    dextrose bolus 10% 250 mL  250 mL IntraVENous PRN    Glucagon Emergency KIT 1 mg  1 mg SubCUTAneous PRN    dextrose 10 % infusion   IntraVENous Continuous PRN    carboxymethylcellulose (THERATEARS) 1 % ophthalmic gel 1 drop  1 drop Both Eyes TID PRN       Signed:  LUIS Davis    Part of this note may have been written by using a voice dictation software.  The note has been proof read but may still contain some grammatical/other typographical errors.

## 2024-04-22 NOTE — WOUND CARE
Sacral area assessed coccyx with dark purple non blanchable area with blisters extending to buttocks, likely pressure/ shear mix wound, DTI.  Recommend silicone foam dressing every other day and prn.  Would expect the blisters to rupture and peel and the darkest purple areas to demarcate to a stage 2 or 3 pressure injury if not deeper. Frequent turning and offloading needed.

## 2024-04-22 NOTE — PROGRESS NOTES
US Guided PIV access-    Ultrasound was used to find the vein which was compressible and without any ultrasound features of an artery or nerve bundle. Skin was cleaned and disinfected prior to IV puncture.  Under real-time ultrasound guidance peripheral access was obtained in the left forearm using 22 G 1.75\" Peripheral IV catheter after 1 attempt(s). Blood return was present and IV flushed without difficulty with no clinical signs of infiltration. IV dressing applied and no immediate complications noted. Patient tolerated the procedure well.

## 2024-04-22 NOTE — CONSULTS
[facility]  Inpatient Rehab Consult        Admission Date: 4/19/2024  Primary Care Provider: Elver Trevino MD  Specialty Group / Referring Service: Medicine      Chief Complaint : Encephalopathy, weakness  Admitting Diagnosis:   Hyperglycemia [R73.9]  Right leg weakness [R29.898]  ORLANDO (acute kidney injury) (HCC) [N17.9]    Principal Problem:    ORLANDO (acute kidney injury) (HCC)  Active Problems:    Chronic atrial fibrillation (HCC)    Dyslipidemia    Atherosclerosis of native coronary artery of native heart with stable angina pectoris (HCC)    Essential hypertension, benign    Bilateral carotid artery stenosis    Type 2 diabetes mellitus (HCC)    Epilepsy (HCC)    History of cardioembolic cerebrovascular accident (CVA)    Right leg weakness    Hyponatremia    Dehydration    Leukocytosis    Right middle lobe pneumonia  Resolved Problems:    * No resolved hospital problems. *      Acute Rehab Diagnoses:  Encounter for rehabilitation [Z51.89]   Abnormality of gait and mobility [R26.9]  Decreased independence for activities of daily living (ADL) [Z78.9]  Physical debility / deconditioning [R53.81]      Medical Dx:  Past Medical History:   Diagnosis Date    Arrhythmia     AFlutter/SVT    Arthritis     CAD (coronary artery disease) 2010    2 xstents, Followed by Dr. Betancourt     Chronic kidney disease     Stage III    Coagulation disorder (HCC)     eliquis/plavix    Coronary atherosclerosis of native coronary artery 5/27/2010    2010:  PCI LAD Xience x 2    Diabetes (HCC) type 2    Controlled with diet     Dyslipidemia 5/27/2010    Essential hypertension, benign      History of coronary artery stent placement 2010    LAC, LAD stented    History of kidney stones     History of prior ablation treatment 2014    due to atrial fib    Kidney disease     Was seen by [place] nephrology after sepsis for acute Kidney disease, has since been released    Kidney stone     Paroxysmal atrial  services per day, five days a week to include Physical Therapy and Occupational Therapy in addition to SLP if indicated  -As patient's medical status improves, mentation and cognition clears, will reevaluate patient to see if he is able to tolerate 3 hours of therapy at that time.  Patient's daughter Arielle is aware of current recommendation and plan to continue following for reevaluation for IRF in near future  -Thank you for this consult. PM&R will continue following during hospital stay            Code status: Full Code     Laura Orta D.O. PM&R  Inpatient Rehabilitation Medical Director    April 22, 2024

## 2024-04-22 NOTE — DIABETES MGMT
Patient admitted with ORLANDO. Admit blood glucose 367.  HgbA1C 9.3 (eAG 220). Blood glucose ranged 151-335 yesterday with patient receiving Lantus 23 units and Humalog 22 units. Blood glucose this morning was 163. Creatinine 1.60. GFR 42. Reviewed patient current regimen: Lantus 23 units HS, Humalog correctional insulin, and Humalog 3 units with meals.  Patient would likely benefit from an increase in prandial insulin to help offset hyperglycemia during the day related to caloric intake.  Patient would also likely benefit from an decrease in correctional insulin to reduce the risk of overcorrection of hyperglycemia and subsequent hypoglycemia.  Provider updated via [employer] regarding recommendations and patient glycemic control.

## 2024-04-22 NOTE — CONSULTS
Comprehensive Nutrition Assessment    Type and Reason for Visit: Initial, Consult  General Nutrition Management/other reason (Hospitalists)-malnutrition     Nutrition Recommendations/Plan:   Meals and Snacks:  Diet: Continue current order  Nutrition Supplement Therapy:  Medical food supplement therapy:  None  defer until able to interview patient       Malnutrition Assessment:  Malnutrition Status: Insufficient data (Pt unavailable)  NFPE: deferred d/t pt unavailable       Nutrition Assessment:  Nutrition History: Deferred        Weight History: Per EMR review: 204-208#  Nutrition Background:       PMH/PSH: chronic persistent atrial fibrillation , HTN, DM2, epilepsy and recent cardioembolic CVA .   Presented to the ER on 4/19 with worsening right leg weakness and generalized fatigue after being discharged home on 4/18. Per the patient, and daughter at bedside, the patient felt good going home on 4/18 but upon arrival to the house he was unable to get up and walk and needed maximum assistance to get into the house. They got him to his chair where he remained. He ate and drank minimal food and drink while at home. He was unable to get out of the chair on 4/19 so EMS was called to bring him back to the ER.   Findings of ORLANDO, dehydration. right leg weakness, PNA, hyperglycemia  CT head shows right frontal stroke evolution with encephalomalacia.   CT abdomen pelvis showed a pancreatic mass, splenic lesions and a kidney lesion that were all nonspecific, concerning for cancer  Wound care note 4/22: Sacral area assessed coccyx with dark purple non blanchable area with blisters extending to buttocks, likely pressure/ shear mix wound, DTI.   Nutrition Interval:  4/20: SLP eval-regular diet   Attempted to see pt X 2, VAT in room then RN in room doing neuro eval.  Per EMR, pt with waxing and waning mentation as well as nausea which all could potentially impact po intake though all recorded intake is at %  Current

## 2024-04-23 ENCOUNTER — APPOINTMENT (OUTPATIENT)
Dept: GENERAL RADIOLOGY | Age: 84
DRG: 064 | End: 2024-04-23
Payer: MEDICARE

## 2024-04-23 PROBLEM — I63.9 CARDIOEMBOLIC STROKE (HCC): Status: ACTIVE | Noted: 2024-04-23

## 2024-04-23 LAB
ALBUMIN SERPL-MCNC: 1.9 G/DL (ref 3.2–4.6)
ALBUMIN/GLOB SERPL: 0.5 (ref 0.4–1.6)
ALP SERPL-CCNC: 498 U/L (ref 50–136)
ALT SERPL-CCNC: 94 U/L (ref 12–65)
ANION GAP SERPL CALC-SCNC: 6 MMOL/L (ref 2–11)
APPEARANCE UR: CLEAR
AST SERPL-CCNC: 87 U/L (ref 15–37)
B PERT DNA SPEC QL NAA+PROBE: NOT DETECTED
BACTERIA URNS QL MICRO: 0 /HPF
BASOPHILS # BLD: 0.1 K/UL (ref 0–0.2)
BASOPHILS NFR BLD: 0 % (ref 0–2)
BILIRUB SERPL-MCNC: 1 MG/DL (ref 0.2–1.1)
BILIRUB UR QL: NEGATIVE
BORDETELLA PARAPERTUSSIS BY PCR: NOT DETECTED
BUN SERPL-MCNC: 37 MG/DL (ref 8–23)
C PNEUM DNA SPEC QL NAA+PROBE: NOT DETECTED
CALCIUM SERPL-MCNC: 8.6 MG/DL (ref 8.3–10.4)
CASTS URNS QL MICRO: ABNORMAL /LPF
CEA SERPL-MCNC: 110 NG/ML (ref 0–3.8)
CHLORIDE SERPL-SCNC: 108 MMOL/L (ref 103–113)
CO2 SERPL-SCNC: 22 MMOL/L (ref 21–32)
COLOR UR: ABNORMAL
CREAT SERPL-MCNC: 1.9 MG/DL (ref 0.8–1.5)
DIFFERENTIAL METHOD BLD: ABNORMAL
EOSINOPHIL # BLD: 0 K/UL (ref 0–0.8)
EOSINOPHIL NFR BLD: 0 % (ref 0.5–7.8)
EPI CELLS #/AREA URNS HPF: ABNORMAL /HPF
ERYTHROCYTE [DISTWIDTH] IN BLOOD BY AUTOMATED COUNT: 12.8 % (ref 11.9–14.6)
FLUAV SUBTYP SPEC NAA+PROBE: NOT DETECTED
FLUBV RNA SPEC QL NAA+PROBE: NOT DETECTED
GLOBULIN SER CALC-MCNC: 3.6 G/DL (ref 2.8–4.5)
GLUCOSE BLD STRIP.AUTO-MCNC: 195 MG/DL (ref 65–100)
GLUCOSE BLD STRIP.AUTO-MCNC: 231 MG/DL (ref 65–100)
GLUCOSE BLD STRIP.AUTO-MCNC: 260 MG/DL (ref 65–100)
GLUCOSE BLD STRIP.AUTO-MCNC: 301 MG/DL (ref 65–100)
GLUCOSE SERPL-MCNC: 257 MG/DL (ref 65–100)
GLUCOSE UR STRIP.AUTO-MCNC: 250 MG/DL
HADV DNA SPEC QL NAA+PROBE: NOT DETECTED
HCOV 229E RNA SPEC QL NAA+PROBE: NOT DETECTED
HCOV HKU1 RNA SPEC QL NAA+PROBE: NOT DETECTED
HCOV NL63 RNA SPEC QL NAA+PROBE: NOT DETECTED
HCOV OC43 RNA SPEC QL NAA+PROBE: NOT DETECTED
HCT VFR BLD AUTO: 39.5 % (ref 41.1–50.3)
HGB BLD-MCNC: 13.3 G/DL (ref 13.6–17.2)
HGB UR QL STRIP: ABNORMAL
HMPV RNA SPEC QL NAA+PROBE: NOT DETECTED
HPIV1 RNA SPEC QL NAA+PROBE: NOT DETECTED
HPIV2 RNA SPEC QL NAA+PROBE: NOT DETECTED
HPIV3 RNA SPEC QL NAA+PROBE: NOT DETECTED
HPIV4 RNA SPEC QL NAA+PROBE: NOT DETECTED
IMM GRANULOCYTES # BLD AUTO: 0.1 K/UL (ref 0–0.5)
IMM GRANULOCYTES NFR BLD AUTO: 1 % (ref 0–5)
KETONES UR QL STRIP.AUTO: NEGATIVE MG/DL
LEUKOCYTE ESTERASE UR QL STRIP.AUTO: ABNORMAL
LYMPHOCYTES # BLD: 1.2 K/UL (ref 0.5–4.6)
LYMPHOCYTES NFR BLD: 7 % (ref 13–44)
M PNEUMO DNA SPEC QL NAA+PROBE: NOT DETECTED
MCH RBC QN AUTO: 32.5 PG (ref 26.1–32.9)
MCHC RBC AUTO-ENTMCNC: 33.7 G/DL (ref 31.4–35)
MCV RBC AUTO: 96.6 FL (ref 82–102)
MONOCYTES # BLD: 2.1 K/UL (ref 0.1–1.3)
MONOCYTES NFR BLD: 13 % (ref 4–12)
NEUTS SEG # BLD: 12.5 K/UL (ref 1.7–8.2)
NEUTS SEG NFR BLD: 79 % (ref 43–78)
NITRITE UR QL STRIP.AUTO: NEGATIVE
NRBC # BLD: 0 K/UL (ref 0–0.2)
OTHER OBSERVATIONS: ABNORMAL
PH UR STRIP: 5.5 (ref 5–9)
PLATELET # BLD AUTO: 202 K/UL (ref 150–450)
PMV BLD AUTO: 12.6 FL (ref 9.4–12.3)
POTASSIUM SERPL-SCNC: 4.2 MMOL/L (ref 3.5–5.1)
PROCALCITONIN SERPL-MCNC: 0.42 NG/ML (ref 0–0.1)
PROT SERPL-MCNC: 5.5 G/DL (ref 6.3–8.2)
PROT UR STRIP-MCNC: 100 MG/DL
RBC # BLD AUTO: 4.09 M/UL (ref 4.23–5.6)
RBC #/AREA URNS HPF: >100 /HPF
RSV RNA SPEC QL NAA+PROBE: NOT DETECTED
RV+EV RNA SPEC QL NAA+PROBE: NOT DETECTED
SARS-COV-2 RNA RESP QL NAA+PROBE: NOT DETECTED
SERVICE CMNT-IMP: ABNORMAL
SODIUM SERPL-SCNC: 136 MMOL/L (ref 136–146)
SP GR UR REFRACTOMETRY: 1.02 (ref 1–1.02)
UROBILINOGEN UR QL STRIP.AUTO: 1 EU/DL (ref 0.2–1)
WBC # BLD AUTO: 15.9 K/UL (ref 4.3–11.1)
WBC URNS QL MICRO: ABNORMAL /HPF

## 2024-04-23 PROCEDURE — 97112 NEUROMUSCULAR REEDUCATION: CPT

## 2024-04-23 PROCEDURE — 81001 URINALYSIS AUTO W/SCOPE: CPT

## 2024-04-23 PROCEDURE — 36415 COLL VENOUS BLD VENIPUNCTURE: CPT

## 2024-04-23 PROCEDURE — 82962 GLUCOSE BLOOD TEST: CPT

## 2024-04-23 PROCEDURE — 2580000003 HC RX 258: Performed by: INTERNAL MEDICINE

## 2024-04-23 PROCEDURE — 6370000000 HC RX 637 (ALT 250 FOR IP): Performed by: INTERNAL MEDICINE

## 2024-04-23 PROCEDURE — 71045 X-RAY EXAM CHEST 1 VIEW: CPT

## 2024-04-23 PROCEDURE — 80053 COMPREHEN METABOLIC PANEL: CPT

## 2024-04-23 PROCEDURE — 99223 1ST HOSP IP/OBS HIGH 75: CPT | Performed by: PHYSICIAN ASSISTANT

## 2024-04-23 PROCEDURE — 1100000000 HC RM PRIVATE

## 2024-04-23 PROCEDURE — 82105 ALPHA-FETOPROTEIN SERUM: CPT

## 2024-04-23 PROCEDURE — 97530 THERAPEUTIC ACTIVITIES: CPT

## 2024-04-23 PROCEDURE — 2580000003 HC RX 258: Performed by: PHYSICIAN ASSISTANT

## 2024-04-23 PROCEDURE — 86301 IMMUNOASSAY TUMOR CA 19-9: CPT

## 2024-04-23 PROCEDURE — 92526 ORAL FUNCTION THERAPY: CPT

## 2024-04-23 PROCEDURE — 0202U NFCT DS 22 TRGT SARS-COV-2: CPT

## 2024-04-23 PROCEDURE — 6370000000 HC RX 637 (ALT 250 FOR IP): Performed by: PHYSICIAN ASSISTANT

## 2024-04-23 PROCEDURE — 97535 SELF CARE MNGMENT TRAINING: CPT

## 2024-04-23 PROCEDURE — 85025 COMPLETE CBC W/AUTO DIFF WBC: CPT

## 2024-04-23 PROCEDURE — 92523 SPEECH SOUND LANG COMPREHEN: CPT

## 2024-04-23 PROCEDURE — 84145 PROCALCITONIN (PCT): CPT

## 2024-04-23 PROCEDURE — 6370000000 HC RX 637 (ALT 250 FOR IP): Performed by: NURSE PRACTITIONER

## 2024-04-23 PROCEDURE — 82378 CARCINOEMBRYONIC ANTIGEN: CPT

## 2024-04-23 RX ORDER — INSULIN LISPRO 100 [IU]/ML
8 INJECTION, SOLUTION INTRAVENOUS; SUBCUTANEOUS
Status: DISCONTINUED | OUTPATIENT
Start: 2024-04-23 | End: 2024-04-24

## 2024-04-23 RX ORDER — AMLODIPINE BESYLATE 10 MG/1
10 TABLET ORAL DAILY
Status: DISCONTINUED | OUTPATIENT
Start: 2024-04-24 | End: 2024-04-27 | Stop reason: HOSPADM

## 2024-04-23 RX ORDER — INSULIN GLARGINE 100 [IU]/ML
25 INJECTION, SOLUTION SUBCUTANEOUS NIGHTLY
Status: DISCONTINUED | OUTPATIENT
Start: 2024-04-23 | End: 2024-04-27 | Stop reason: HOSPADM

## 2024-04-23 RX ORDER — SODIUM CHLORIDE 9 MG/ML
INJECTION, SOLUTION INTRAVENOUS CONTINUOUS
Status: DISCONTINUED | OUTPATIENT
Start: 2024-04-23 | End: 2024-04-24

## 2024-04-23 RX ORDER — INSULIN LISPRO 100 [IU]/ML
6 INJECTION, SOLUTION INTRAVENOUS; SUBCUTANEOUS
Status: DISCONTINUED | OUTPATIENT
Start: 2024-04-23 | End: 2024-04-23

## 2024-04-23 RX ADMIN — ATORVASTATIN CALCIUM 80 MG: 80 TABLET, FILM COATED ORAL at 21:08

## 2024-04-23 RX ADMIN — INSULIN LISPRO 6 UNITS: 100 INJECTION, SOLUTION INTRAVENOUS; SUBCUTANEOUS at 11:57

## 2024-04-23 RX ADMIN — DOCUSATE SODIUM 100 MG: 100 CAPSULE, LIQUID FILLED ORAL at 08:49

## 2024-04-23 RX ADMIN — LEVETIRACETAM 500 MG: 500 TABLET, FILM COATED ORAL at 21:08

## 2024-04-23 RX ADMIN — Medication 1 CAPSULE: at 08:49

## 2024-04-23 RX ADMIN — SODIUM CHLORIDE, PRESERVATIVE FREE 10 ML: 5 INJECTION INTRAVENOUS at 08:50

## 2024-04-23 RX ADMIN — INSULIN LISPRO 8 UNITS: 100 INJECTION, SOLUTION INTRAVENOUS; SUBCUTANEOUS at 17:09

## 2024-04-23 RX ADMIN — INSULIN LISPRO 4 UNITS: 100 INJECTION, SOLUTION INTRAVENOUS; SUBCUTANEOUS at 17:09

## 2024-04-23 RX ADMIN — LEVETIRACETAM 500 MG: 500 TABLET, FILM COATED ORAL at 08:49

## 2024-04-23 RX ADMIN — INSULIN LISPRO 6 UNITS: 100 INJECTION, SOLUTION INTRAVENOUS; SUBCUTANEOUS at 11:58

## 2024-04-23 RX ADMIN — SODIUM CHLORIDE: 9 INJECTION, SOLUTION INTRAVENOUS at 17:12

## 2024-04-23 RX ADMIN — APIXABAN 2.5 MG: 2.5 TABLET, FILM COATED ORAL at 08:49

## 2024-04-23 RX ADMIN — ASPIRIN 81 MG: 81 TABLET, COATED ORAL at 08:49

## 2024-04-23 RX ADMIN — INSULIN LISPRO 8 UNITS: 100 INJECTION, SOLUTION INTRAVENOUS; SUBCUTANEOUS at 21:09

## 2024-04-23 RX ADMIN — COLCHICINE 0.6 MG: 0.6 TABLET, FILM COATED ORAL at 08:49

## 2024-04-23 RX ADMIN — POLYETHYLENE GLYCOL 3350 17 G: 17 POWDER, FOR SOLUTION ORAL at 08:49

## 2024-04-23 RX ADMIN — APIXABAN 2.5 MG: 2.5 TABLET, FILM COATED ORAL at 21:08

## 2024-04-23 RX ADMIN — INSULIN GLARGINE 25 UNITS: 100 INJECTION, SOLUTION SUBCUTANEOUS at 21:08

## 2024-04-23 RX ADMIN — AMLODIPINE BESYLATE 5 MG: 5 TABLET ORAL at 08:49

## 2024-04-23 ASSESSMENT — PAIN SCALES - GENERAL
PAINLEVEL_OUTOF10: 0
PAINLEVEL_OUTOF10: 0

## 2024-04-23 NOTE — CONSULTS
Palliative Care    Patient: Jayy Johnson MRN: [id number]  SSN: [id number]    YOB: 1940  Age: 84 y.o.  Sex: male       Date of Request: 4/23/2024  Date of Consult:  4/23/2024  Reason for Consult:  family support and education and goals of care  Requesting Physician: Amy Vásquez PA-C     Assessment/Plan:     Principal Diagnosis:    Debility, Unspecified  R53.81    Additional Diagnoses:   Frailty  R54  Seizures, Convulsions  R56.9  Encounter for Palliative Care  Z51.5  Recurrent CVAs in setting of likely metastatic cancer    Palliative Performance Scale (PPS):   40-50    Medical Decision Making:   Reviewed and summarized most recent  hospitalization and current, MRI reports. Imaging results.   Discussed case with appropriate providers. D/w case management and hospitalist team.     Discussion had with daughter, Arielle, over the phone. Current desire is for patient to go to rehab and get stronger, then go home with palliative care at home. Given the complexity of his comorbidities, we agreed to an in-person meeting family tomorrow morning at 10am to discuss code status and further plans upon discharge (whether home with PC, hospice or to pursue cancer workup). Arielle is agreeable to checking cancer markers. For now, pt is a full code. His wife is primary decision maker with his daughter, Arielle, being 2nd. She notes that so far, the family has all been in agreement with their hopes for the patient.     Will discuss findings with members of the interdisciplinary team.      Thank you for this referral.       Subjective:     History obtained from:  Patient, Family, Care Provider, and Chart    Chief Complaint: Recurrent stroke    History of Present Illness:    Jayy Johnson is an 84 y.o. male with chronic persistent atrial fibrillation (previously on Xarelto, now on Eliquis), HTN, DM2, epilepsy and recent cardioembolic CVA who presented back to the ER on 4/19 with worsening right leg weakness and  Coagulation disorder (HCC)     eliquis/plavix    Coronary atherosclerosis of native coronary artery 5/27/2010    2010:  PCI LAD Xience x 2    Diabetes (HCC) type 2    Controlled with diet     Dyslipidemia 5/27/2010    Essential hypertension, benign      History of coronary artery stent placement 2010    LAC, LAD stented    History of kidney stones     History of prior ablation treatment 2014    due to atrial fib    Kidney disease     Was seen by [place] nephrology after sepsis for acute Kidney disease, has since been released    Kidney stone     Paroxysmal atrial fibrillation (HCC) 10/2/2015    Platelet inhibition due to Plavix     Post PTCA     WITH STENT    Sepsis ([facility])     Shingles outbreak     Syncope and collapse 07/25/2014      Past Surgical History:   Procedure Laterality Date    CARDIAC CATHETERIZATION  2010    2 stents    CATARACT REMOVAL Bilateral     CYST REMOVAL  years ago    scrotal    ID UNLISTED PROCEDURE CARDIAC SURGERY  2014    ablation     Family History   Problem Relation Age of Onset    Heart Disease Sister         mild heart attack    Cancer Brother     Diabetes Brother     Diabetes Brother     Diabetes Father     Heart Disease Mother       Social History     Tobacco Use    Smoking status: Never    Smokeless tobacco: Never   Substance Use Topics    Alcohol use: No     Prior to Admission medications    Medication Sig Start Date End Date Taking? Authorizing Provider   aspirin 81 MG EC tablet Take 1 tablet by mouth daily 4/18/24   Flip Croft DO   rivaroxaban (XARELTO) 20 MG TABS tablet Take 1 tablet by mouth daily (with breakfast) 4/18/24 5/18/24  Flip Croft DO   atorvastatin (LIPITOR) 80 MG tablet Take 1 tablet by mouth nightly 4/18/24   Flip Croft DO   levETIRAcetam (KEPPRA) 500 MG tablet Take 1 tablet by mouth 2 times daily 4/18/24   Flip Croft DO   amoxicillin-clavulanate (AUGMENTIN) 875-125 MG per tablet Take 1 tablet by mouth 2 times daily for 5 days 4/18/24 4/23/24  Lang  lesions.   Neurologic: R sided neglect noted, although moving R hand   Psych: Alert and oriented     Signed By: Kim Conroy PA-C     April 23, 2024

## 2024-04-23 NOTE — PROGRESS NOTES
GOALS:  LTG: Patient will maintain adequate hydration/nutrition with optimum safety and efficiency of swallowing function with PO intake without overt signs or symptoms of aspiration for the highest appropriate diet level.  STG:  Patient will consume regular consistency textures and thin liquids without overt signs or symptoms of airway compromise. Ongoing 2024    LTG: Patient will increase receptive/expressive language skills demonstrated by the ability to communicate basic wants/needs across environments.   STG:  Patient will participate in speech/language evaluation, as indicated, with 100% compliance. Completed 2024  Patient will display orientation to place and time with 60% accuracy with assistance of external aids. Added 2024  Patient will recall interactions within current environment for a 24 hour interval with 75% accuracy given min A. Added 2024    SPEECH LANGUAGE PATHOLOGY: Dysphagia daily treatment and Cognitive- Communicative Assessment     Acknowledge Order  I  Therapy Time  I   Charges     I  Rehab Caseload Tracker      NAME: Jayy Johnson  : 1940  MRN: [id number]    ADMISSION DATE: 2024  PRIMARY DIAGNOSIS: ORLANDO (acute kidney injury) (Formerly [facility])    ICD-10: Treatment Diagnosis: R13.12 Dysphagia, Oropharyngeal Phase    RECOMMENDATIONS   Diet:    Regular Consistency  Thin Liquids    Medication: presented one at a time    Patient does exhibit deficits with attention, orientation, memory, and insight. Patient/ family working to determine direction of care. Recommend speech therapy post acute care if patient/ family wanting rehabilitation post acute care.    Compensatory Swallowing Strategies:   Alternate solids and liquids  Remain upright for 30-45 minutes after meals  Small bites/sips  Total feed  Upright as possible for all oral intake    Compensatory Cognitive-Communicative Strategies:   Frequent reorientation to place, time, and situation.   One step verbally issued  all meds at once yesterday, did fine when given one at a time  Prior Instrumental Assessment: no    Current Diet:  Regular Consistency  Thin Liquids    Respiratory Status: Room air    Pain:  Patient does not c/o pain  Pain intervention: None- No pain observed  Pain response: n/a    OBJECTIVE    Oropharyngeal Assessment: Patient agreeable to PO trials. Able to consume 4 oz of thin liquids via straw and x's 2 trisha crackers. Clinician assisted with PO presentations. Oral phase of swallow required increased time for prep of solids and mild anterior loss of thin liquids to patient's L hand side. Pharyngeal phase of swallow was unremarkable. No overt signs of aspiration were noted. Family reports patient consuming AM meal without difficulty. Recommend to continue with regular diet and thin liquids.     Cognitive- Communicative: Family reports patient does have hearing loss and typically wears hearing aids (not currently in place). Patient reports being able to hear clinician with volume modified and patient also directed to ask for clarification, if needed. Patient oriented to self, type of facility as hospital though states location as \"[place]\" (patient's hometown), year as 1984 though able to state day and month correct without assistance (COGNISTAT orientation subtest score of 6/12 indicating moderate deficit per test criteria). Patient able to restate four unrelated words without assistance. Recall of the same four words after a 5 minute interval unable to be performed without assistance though patient able to recall 1/4 words when given multiple choices (COGNISTAT memory subtest score of 1/12 indicating severe deficit).     Patient denies difficulty with memory and orientation when discussing function. Speech therapy will treat for dysphagia and cognitive- communicative function during acute phase of care.     Dysphagia Outcome and Severity Scale (ASHWIN)  Score: 6 Description   [] 7 Normal in all situations   [] 6

## 2024-04-23 NOTE — PROGRESS NOTES
ACUTE OCCUPATIONAL THERAPY GOALS:   (Developed with and agreed upon by patient and/or caregiver.)  1. Patient will feed self entire meal with either and adaptive utensils as needed.   2. Patient will complete upper body dressing and bathing with mod A and adaptive equipment as needed.   3. Patient will participate in BUE therapeutic exercises to increase strength in R UEs to at least 3/5 for participation in ADLs and functional transfers.   4. Patient will participate in BUE therapeutic activities to increase coordination in R UE to WFL for bimanual fine motor ADLs.   5. Patient will attend to R and L side for 100% of treatment session with less than 2 verbal cues from therapist.   6. Patient will complete functional transfers with mod A and adaptive equipment as needed.   7. Patient will complete grooming with mod A in supported sitting at sink level with mod verbal cues.     Timeframe: 7 visits      OCCUPATIONAL THERAPY: Daily Note PM   OT Visit Days: 2   Time In/Out  OT Charge Capture  Rehab Caseload Tracker  OT Orders    Jayy Johnson is a 84 y.o. male   PRIMARY DIAGNOSIS: Cardioembolic stroke (HCC)  Hyperglycemia [R73.9]  Right leg weakness [R29.898]  ORLANDO (acute kidney injury) (HCC) [N17.9]       Inpatient: Payor: MEDICARE / Plan: MEDICARE PART A AND B / Product Type: *No Product type* /     ASSESSMENT:     REHAB RECOMMENDATIONS:   Recommendation to date pending progress:  Setting:  Inpatient Rehab Facility     Equipment:    To Be Determined     ASSESSMENT:  Mr. Johnson presents with decreased activity tolerance, balance, strength and mobility impacting ADLs. Pt found to have right frontal lobe subacute infarct and tiny left cerebellar subacute infarcts. Reassessed pt RUE,  strength 3+/5, shoulder extension 3/5 and able to perform finger flexion/extension with increased time. Pt noted to be soiled, required max A for BM hygiene and brief change in supine. Pt then worked on sitting balance, sitting  balance activity   with maximal assistance, verbal cues, tactile cues, and visual cues to improve sitting balance, standing balance, awareness of affected jemma-body, awareness of R visual field , proprioception, fine motor skills, posture, coordination, static balance, and dynamic balance in order to prepare for functional task, prepare for seated ADLs, prepare for standing ADLs, prepare for functional transfer, increase safety awareness, improve safety of affected upper extremity , improve functional usage of affected upper extremity, and prepare for self care..   Self Care (25 minutes): Patient participated in toileting, upper body dressing, and lower body dressing ADLs in unsupported sitting and supine with maximal visual, verbal, manual, and tactile cueing to increase independence, decrease assistance required, and increase activity tolerance. Patient also participated in functional mobility and functional transfer training to increase independence, decrease assistance required, increase activity tolerance, and increase safety awareness.     TREATMENT GRID:  N/A    AFTER TREATMENT PRECAUTIONS: Alarm Activated, Bed, Call light within reach, Needs within reach, RN notified, and Visitors at bedside    INTERDISCIPLINARY COLLABORATION:  RN/ PCT, PT/ PTA, and OT/ MCFADDEN    EDUCATION:       TOTAL TREATMENT DURATION AND TIME:  Time In: 1322  Time Out: 1402  Minutes: 40    CHENTE Portillo, OT

## 2024-04-23 NOTE — PROGRESS NOTES
Hospitalist Progress Note   Admit Date:  2024  1:27 PM   Name:  Jayy Johnson   Age:  84 y.o.  Sex:  male  :  1940   MRN:  [id number]   Room:  [facility]/    Presenting/Chief Complaint: Hyperglycemia     Reason(s) for Admission: Hyperglycemia [R73.9]  Right leg weakness [R29.898]  ORLANDO (acute kidney injury) (HCC) [N17.9]     Hospital Course:   Jayy Johnson is a 84 y.o. male with chronic persistent atrial fibrillation (previously on Xarelto though changed to eliquis per cards 24), HTN, DM2, epilepsy and recent cardioembolic CVA presented back to the ER on  with worsening right leg weakness and generalized fatigue after being discharged home on .  He was found w/ ORLANDO and hyponatremia and admitted to the Hospitalist service.  Patient had repeat MRI brains and Neurology recommended mgmt as is done with acute infarcts.  Neurology signed off and requested outpatient follow up. He had nausea prompting a CT a/p which was concerning for pancreatic mass, and liver masses. No known hx of cancer. Now has skin breakdown to sacrum which seems to be new since CVA sequelae.     Subjective & 24hr Events:   Seen with KIMO at bedside. \"I am doing better today.\" Pain in his right arm has improved since initiation of colchicine.  White blood cell count is uptrending nonspecifically, now with 79% neutrophils. sCr bumped after contrast. He is eating breakfast. His right leg is contracted. Denies pain in coccyx area and does not know if he had any skin breakdown prior to coming to the hospital.  Understands that he was denied for inpatient rehab.  States that he just wants to go home..  I discussed palliative care and hospice care.  Reviewed the differences with him, his son-in-law and daughter-in-law over the phone.  Explained that I would have palliative care come and speak to him as well.  He expressed understanding.    Assessment & Plan:     History of cardioembolic cerebrovascular accident  //  Collection Time: 04/23/24 11:29 AM   Result Value Ref Range    POC Glucose 301 (H) 65 - 100 mg/dL    Performed by: Allison        No results for input(s): \"COVID19\" in the last 72 hours.    Current Meds:  Current Facility-Administered Medications   Medication Dose Route Frequency    [START ON 4/24/2024] amLODIPine (NORVASC) tablet 10 mg  10 mg Oral Daily    insulin lispro (HUMALOG) injection vial 8 Units  8 Units SubCUTAneous 4x Daily AC & HS    insulin glargine (LANTUS) injection vial 25 Units  25 Units SubCUTAneous Nightly    0.9 % sodium chloride infusion   IntraVENous Continuous    insulin lispro (HUMALOG) injection vial 0-8 Units  0-8 Units SubCUTAneous TID WC    insulin lispro (HUMALOG) injection vial 0-4 Units  0-4 Units SubCUTAneous Nightly    diatrizoate meglumine-sodium (GASTROGRAFIN) 66-10 % solution 15 mL  15 mL Oral ONCE PRN    albuterol sulfate HFA (PROVENTIL;VENTOLIN;PROAIR) 108 (90 Base) MCG/ACT inhaler 2 puff  2 puff Inhalation Q6H PRN    colchicine (COLCRYS) tablet 0.6 mg  0.6 mg Oral Daily    promethazine (PHENERGAN) tablet 12.5 mg  12.5 mg Oral Q6H PRN    lactobacillus (CULTURELLE) capsule 1 capsule  1 capsule Oral Daily with breakfast    apixaban (ELIQUIS) tablet 2.5 mg  2.5 mg Oral BID    acetaminophen (TYLENOL) tablet 650 mg  650 mg Oral Q6H PRN    aspirin EC tablet 81 mg  81 mg Oral Daily    atorvastatin (LIPITOR) tablet 80 mg  80 mg Oral Nightly    docusate sodium (COLACE) capsule 100 mg  100 mg Oral BID    [Held by provider] hydroCHLOROthiazide capsule 12.5 mg  12.5 mg Oral Daily    levETIRAcetam (KEPPRA) tablet 500 mg  500 mg Oral BID    [Held by provider] losartan (COZAAR) tablet 100 mg  100 mg Oral Daily    polyethylene glycol (GLYCOLAX) packet 17 g  17 g Oral Daily    sodium chloride flush 0.9 % injection 5-40 mL  5-40 mL IntraVENous 2 times per day    sodium chloride flush 0.9 % injection 5-40 mL  5-40 mL IntraVENous PRN    0.9 % sodium chloride infusion   IntraVENous PRN

## 2024-04-23 NOTE — PROGRESS NOTES
ACUTE PHYSICAL THERAPY GOALS:   (Developed with and agreed upon by patient and/or caregiver.)  (1.) Jayy Johnson  will move from supine to sit and sit to supine , scoot up and down, and roll side to side with INDEPENDENT within 7 treatment day(s).    (2.) Jayy Johnson will transfer from bed to chair and chair to bed with Contact guard assist using the least restrictive device within 7 treatment day(s).    (3.) Jayy Johnson will ambulate with min assist for 100 feet with the least restrictive device within 7 treatment day(s).   (4.) Jayy Johnson will perform standing static and dynamic balance activities x 5 minutes with min assist to improve safety within 7 treatment day(s).  (5.) Jayy Johnson will ascend and descend 1 stairs using no hand rail(s) with min assist to improve functional mobility and safety within 7 treatment day(s).  (6.) Jayy Johnson will perform therapeutic exercises x 10 min for HEP with SUPERVISION to improve strength, endurance, and functional mobility within 7 treatment day(s).      PHYSICAL THERAPY: Daily Note PM   (Link to Caseload Tracking: PT Visit Days : 3  Time In/Out PT Charge Capture  Rehab Caseload Tracker  Orders    Jayy Johnson is a 84 y.o. male   PRIMARY DIAGNOSIS: Cardioembolic stroke (HCC)  Hyperglycemia [R73.9]  Right leg weakness [R29.898]  ORLANDO (acute kidney injury) (HCC) [N17.9]     Inpatient: Payor: MEDICARE / Plan: MEDICARE PART A AND B / Product Type: *No Product type* /     ASSESSMENT:     REHAB RECOMMENDATIONS:   Recommendation to date pending progress:  Setting:  Inpatient Rehab Facility    Equipment:    To Be Determined     ASSESSMENT:  Mr. Johnson presents resting in bed, agreeable to therapy. Noted RLE is drawn into flexor tone, however therapist able to break it through manual input and steady stretching/PROM/tactile facilitation. Various positions and ranges of RLE facilitated to decrease tone and increase ROM and mobility. Pt      RESTRICTIONS/PRECAUTIONS:  Restrictions/Precautions  Restrictions/Precautions: Fall Risk, Bed Alarm  Restrictions/Precautions: Fall Risk, Bed Alarm     MOBILITY: I Mod I S SBA CGA Min Mod Max Total  NT x2 Comments:   Bed Mobility    Rolling [] [] [] [] [] [] [] [x] [] [] [x]    Supine to Sit [] [] [] [] [] [] [] [x] [] [] [x]    Scooting [] [] [] [] [] [] [] [x] [] [] [x]    Sit to Supine [] [] [] [] [] [] [] [x] [] [] [x]    Transfers    Sit to Stand [] [] [] [] [] [] [] [x] [x] [] [x]   Unable to fully clear bottom   Bed to Chair [] [] [] [] [] [] [] [] [] [x] []    Stand to Sit [] [] [] [] [] [] [] [] [] [x] []     [] [] [] [] [] [] [] [] [] [] []    I=Independent, Mod I=Modified Independent, S=Supervision, SBA=Standby Assistance, CGA=Contact Guard Assistance,   Min=Minimal Assistance, Mod=Moderate Assistance, Max=Maximal Assistance, Total=Total Assistance, NT=Not Tested    BALANCE: Good Fair+ Fair Fair- Poor NT Comments   Sitting Static [] [] [] [x] [] []    Sitting Dynamic [] [] [] [x] [x] []              Standing Static [] [] [] [] [x] []    Standing Dynamic [] [] [] [] [x] []      GAIT: I Mod I S SBA CGA Min Mod Max Total  NT x2 Comments:   Level of Assistance [] [] [] [] [] [] [] [] [] [x] []    Distance       DME N/A    Gait Quality N/A    Weightbearing Status      Stairs      I=Independent, Mod I=Modified Independent, S=Supervision, SBA=Standby Assistance, CGA=Contact Guard Assistance,   Min=Minimal Assistance, Mod=Moderate Assistance, Max=Maximal Assistance, Total=Total Assistance, NT=Not Tested    PLAN:   FREQUENCY AND DURATION: 3 times/week for duration of hospital stay or until stated goals are met, whichever comes first.    TREATMENT:   TREATMENT:   Co-Treatment PT/OT necessary due to patient's decreased overall endurance/tolerance levels, as well as need for high level skilled assistance to complete functional transfers/mobility and functional tasks  Therapeutic Activity (41 Minutes): Therapeutic

## 2024-04-23 NOTE — PROGRESS NOTES
[facility] Hospitalist Service  At the heart of better care     Advance Care Planning   Admit Date:  2024  1:27 PM   Name:  Jayy Johnson   Age:  84 y.o.  Sex:  male  :  1940   MRN:  [id number]   Room:  [facility]    Jayy Johnson has capacity to make his own decisions:   YES    If pt unable to make decisions, POA/surrogate decision maker:  Wife Taryn and Daughter Arielle    Other people present:   KIMO    Patient / surrogate decision-maker directed code status:  Full    Other ACP topics discussed, if applicable:   -Debilitating CVA and sequelae. Possible inability to regain mobility and independence. This could lead to continued pressure sores.   -concerns for metastatic cancer. Pros and cons of pursing work-up and treatment  -difference between palliative and hospice approach to medical care  -encouragement of patient/family discussion about goals of care    Patient or surrogate consented to discussion of the current conditions, workup, management plans, prognosis, and the risk for further deterioration.  Time spent: 45 minutes in direct discussion.      Signed:  LUIS Davis

## 2024-04-23 NOTE — CARE COORDINATION
Spoke with daughter Arielle. She was very encouraged that her dad was now moving his R arm and is much more verbal and engaging today. She gave this CM choices for rehab. There first choice is the 9th floor, and are hoping on reevaluation, he will be accepted. The requested [facility] in [place]. Explained that they were both IPR with the same 3 hour PT requirement. Family still wants a referral to [facility] made. Referral sent. If he is not accepted to [facility] they would like a referral made to the [facility].

## 2024-04-23 NOTE — DIABETES MGMT
Patient seen for assessment regarding diabetes management. Patient daughter at bedside. Patient hard of hearing and has some visual difficulties per daughter. Patient confused at times per nursing. Patient has a past medical history of atrial fibrillation, PCI, HTN, type 2 diabetes, CVA (recent). Per daughter patient was recently discharged and re-admitted to hospital. Per daughter patient has been living with diabetes for many years and voices a positive family history of diabetes. Patient states they do have a working glucometer with supplies at home. Patient would like a CGM. Patient states they are currently taking Glipizide at home for management of diabetes and daughter states it was doubled when discharged from the hospital a few days ago. Per chart review rehab is recommended by therapy at discharge. Educated daughter regarding hypoglycemia signs, symptoms, and treatment. Discussed current hospital regimen. Educated patient daughter on insulin pens and vial/syringe method. 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 Patient daughter states long term she and her siblings will help with patient's care but they all have visual difficulties. Patient daughter is able to see syringe and insulin pens with her glasses on. Discussed ADA recommendations regarding glycemic control in the older adult. Reviewed current diet order and basics of diabetic diet. Patient daughter verbalized understanding and has no questions regarding diabetes management. Diabetes educational folder provided.

## 2024-04-23 NOTE — DIABETES MGMT
Patient admitted with ORLANDO. Blood glucose ranged 163-322 yesterday with patient receiving Lantus 20 units and Humalog 28 units. Blood glucose this morning was 195. Creatinine 1.90. GFR 34. Reviewed patient current regimen: Lantus 20 units HS, Humalog 6 units with meals, and Humalog correctional insulin.  Patient would likely benefit from an increase in prandial insulin to help offset hyperglycemia during the day.  Provider updated via [employer] regarding recommendations and patient glycemic control.

## 2024-04-23 NOTE — PROGRESS NOTES
Discussed CT a/p findings with patient, daughter (Arielle) and son in law. Concerns for pancreatic and liver cancer/mets. He is contracted. Skin issues to ischium seem new since stroke sequelae. Alfonso had to be placed due to urinary retention. He just wants to \"go home.\" Lives with wife.     I discussed palliative care and they seem interested in that more so than hospice. Would like to have family discussion with palliative team in AM on 4/24/24 if that can be arranged. Consult placed to palliative.

## 2024-04-23 NOTE — PROGRESS NOTES
0010 Patient had a 5 beat run of Vtach per monitor room.   0500 Patient kept bradying down to as low as 36, at 0505 he was sustaining AFIB in the 50s.  0638 Patient had a 3.25 second pause.    Notified Dr. Ryan Ventura each time. No new orders.

## 2024-04-23 NOTE — CONSULTS
Comprehensive Nutrition Assessment    Type and Reason for Visit: Reassess  General Nutrition Management/other reason (Hospitalists)-malnutrition     Nutrition Recommendations/Plan:   Meals and Snacks:  Diet: Continue current order  Nutrition Supplement Therapy:  Medical food supplement therapy:  Initiate Renan twice per day (this provides 90 kcal and 2.5 grams protein per packet)     Malnutrition Assessment:  Malnutrition Status: No malnutrition       Nutrition Assessment:  Nutrition History: Son in  reports no issues with pts intake prior to admission. He stated as far as he knows intake was at baseline.         Weight History: per EMR wt hx review of family medicine office visits 5/10 211lb, 8/14 206lb, 12/12 205lb, 4/8 204lb (cardiology).   Nutrition Background:       PMH/PSH: chronic persistent atrial fibrillation , HTN, DM2, epilepsy and recent cardioembolic CVA .    Pt admitted with ORLANDO, dehydration, right leg weakness, PNA, hyperglycemia  CT head shows right frontal stroke evolution with encephalomalacia.   CT abdomen pelvis showed a pancreatic mass, splenic lesions and a kidney lesion that were all nonspecific, concerning for cancer  Wound care note 4/22: Sacral area assessed coccyx with dark purple non blanchable area with blisters extending to buttocks, likely pressure/ shear mix wound, DTI.   Nutrition Interval:  4/20: SLP eval-regular diet   Pt seen asleep in bed with Son in law present. Pt did not wake to name. Son in law provided hx as above. He stated pt ate well for breakfast this morning, RD observed breakfast tray ~60% consumed. Patrick in  stated pt likely would have eaten more however there were many providers rounding this am. He reports pt was in pain last night so he did not eat well at supper. Patrick in  stated pt has eaten well at other meals. He thinks pt would be willing to try Renan.   Current Nutrition Therapies:  ADULT DIET; Regular; 3 carb choices (45 gm/meal)    Current Intake:    Average Meal Intake: % Average Supplements Intake: None Ordered      Anthropometric Measures:  Height: 165.1 cm (5' 5\")  Current Body Wt: 94.3 kg (207 lb 14.3 oz) (4/19), Weight source: Not Specified  BMI: 34.6, Obese Class 1 (BMI 30.0-34.9)  Admission Body Weight: 94.3 kg (207 lb 14.3 oz) (source not specified)  Ideal Body Weight (Kg) (Calculated): 62 kg (136 lbs),    BMI Category Obese Class 1 (BMI 30.0-34.9)  Estimated Daily Nutrient Needs:  Energy (kcal/day): 8289-4978 (15-20 kcal/kg) (Kcal/kg Weight used: 94.3 kg Admission  Protein (g/day): 75-94 (0.8-1 g/kg) Weight Used: (Admission) 94.3 kg  Fluid (ml/day):   (1 ml/kcal)    Nutrition Diagnosis:   Increased nutrient needs related to  (wound) as evidenced by  (saccral wound)  Nutrition Interventions:   Food and/or Nutrient Delivery: Continue Current Diet, Start Oral Nutrition Supplement     Coordination of Nutrition Care: Continue to monitor while inpatient      Goals:   Previous Goal Met: Goal(s) Achieved  Active Goal: Meet at least 75% of estimated needs       Nutrition Monitoring and Evaluation:      Food/Nutrient Intake Outcomes: Supplement Intake, Food and Nutrient Intake  Physical Signs/Symptoms Outcomes: Weight    Discharge Planning:    Continue Oral Nutrition Supplement    Shelli Khan RD

## 2024-04-24 ENCOUNTER — APPOINTMENT (OUTPATIENT)
Dept: ULTRASOUND IMAGING | Age: 84
DRG: 064 | End: 2024-04-24
Payer: MEDICARE

## 2024-04-24 PROBLEM — I80.8 SUPERFICIAL THROMBOPHLEBITIS OF RIGHT UPPER EXTREMITY: Status: ACTIVE | Noted: 2024-04-24

## 2024-04-24 PROBLEM — Z71.1 CONCERN ABOUT CANCER WITHOUT DIAGNOSIS: Status: ACTIVE | Noted: 2024-04-24

## 2024-04-24 PROBLEM — D68.69 SECONDARY HYPERCOAGULABLE STATE (HCC): Status: ACTIVE | Noted: 2024-04-24

## 2024-04-24 LAB
AFP-TM SERPL-MCNC: <1.82 NG/ML (ref 0–8.3)
ALBUMIN SERPL-MCNC: 1.7 G/DL (ref 3.2–4.6)
ALBUMIN/GLOB SERPL: 0.5 (ref 1–1.9)
ALP SERPL-CCNC: 563 U/L (ref 40–129)
ALT SERPL-CCNC: 102 U/L (ref 12–65)
ANION GAP SERPL CALC-SCNC: 10 MMOL/L (ref 9–18)
AST SERPL-CCNC: 140 U/L (ref 15–37)
BASOPHILS # BLD: 0.1 K/UL (ref 0–0.2)
BASOPHILS NFR BLD: 1 % (ref 0–2)
BILIRUB SERPL-MCNC: 0.8 MG/DL (ref 0–1.2)
BUN SERPL-MCNC: 41 MG/DL (ref 8–23)
CALCIUM SERPL-MCNC: 8 MG/DL (ref 8.8–10.2)
CANCER AG19-9 SERPL-ACNC: ABNORMAL U/ML (ref 0–35)
CHLORIDE SERPL-SCNC: 107 MMOL/L (ref 98–107)
CO2 SERPL-SCNC: 21 MMOL/L (ref 20–28)
CREAT SERPL-MCNC: 1.51 MG/DL (ref 0.8–1.3)
DIFFERENTIAL METHOD BLD: ABNORMAL
EOSINOPHIL # BLD: 0.1 K/UL (ref 0–0.8)
EOSINOPHIL NFR BLD: 1 % (ref 0.5–7.8)
ERYTHROCYTE [DISTWIDTH] IN BLOOD BY AUTOMATED COUNT: 12.9 % (ref 11.9–14.6)
GLOBULIN SER CALC-MCNC: 3.5 G/DL (ref 2.3–3.5)
GLUCOSE BLD STRIP.AUTO-MCNC: 117 MG/DL (ref 65–100)
GLUCOSE BLD STRIP.AUTO-MCNC: 236 MG/DL (ref 65–100)
GLUCOSE BLD STRIP.AUTO-MCNC: 279 MG/DL (ref 65–100)
GLUCOSE BLD STRIP.AUTO-MCNC: 363 MG/DL (ref 65–100)
GLUCOSE SERPL-MCNC: 277 MG/DL (ref 70–99)
HCT VFR BLD AUTO: 38.3 % (ref 41.1–50.3)
HGB BLD-MCNC: 12.6 G/DL (ref 13.6–17.2)
IMM GRANULOCYTES # BLD AUTO: 0.1 K/UL (ref 0–0.5)
IMM GRANULOCYTES NFR BLD AUTO: 1 % (ref 0–5)
LYMPHOCYTES # BLD: 1.2 K/UL (ref 0.5–4.6)
LYMPHOCYTES NFR BLD: 9 % (ref 13–44)
MCH RBC QN AUTO: 31.8 PG (ref 26.1–32.9)
MCHC RBC AUTO-ENTMCNC: 32.9 G/DL (ref 31.4–35)
MCV RBC AUTO: 96.7 FL (ref 82–102)
MONOCYTES # BLD: 1.7 K/UL (ref 0.1–1.3)
MONOCYTES NFR BLD: 12 % (ref 4–12)
NEUTS SEG # BLD: 10.4 K/UL (ref 1.7–8.2)
NEUTS SEG NFR BLD: 77 % (ref 43–78)
NRBC # BLD: 0 K/UL (ref 0–0.2)
PLATELET # BLD AUTO: 215 K/UL (ref 150–450)
PMV BLD AUTO: 12.6 FL (ref 9.4–12.3)
POTASSIUM SERPL-SCNC: 4.3 MMOL/L (ref 3.5–5.1)
PROT SERPL-MCNC: 5.2 G/DL (ref 6.3–8.2)
RBC # BLD AUTO: 3.96 M/UL (ref 4.23–5.6)
SERVICE CMNT-IMP: ABNORMAL
SODIUM SERPL-SCNC: 138 MMOL/L (ref 136–145)
WBC # BLD AUTO: 13.5 K/UL (ref 4.3–11.1)

## 2024-04-24 PROCEDURE — 93971 EXTREMITY STUDY: CPT | Performed by: RADIOLOGY

## 2024-04-24 PROCEDURE — 99233 SBSQ HOSP IP/OBS HIGH 50: CPT | Performed by: PHYSICIAN ASSISTANT

## 2024-04-24 PROCEDURE — 80053 COMPREHEN METABOLIC PANEL: CPT

## 2024-04-24 PROCEDURE — 2580000003 HC RX 258: Performed by: PHYSICIAN ASSISTANT

## 2024-04-24 PROCEDURE — 6370000000 HC RX 637 (ALT 250 FOR IP): Performed by: NURSE PRACTITIONER

## 2024-04-24 PROCEDURE — 1100000000 HC RM PRIVATE

## 2024-04-24 PROCEDURE — G0316 PR PROLONG INPT EVAL ADD15 M: HCPCS | Performed by: PHYSICIAN ASSISTANT

## 2024-04-24 PROCEDURE — 82962 GLUCOSE BLOOD TEST: CPT

## 2024-04-24 PROCEDURE — 93971 EXTREMITY STUDY: CPT

## 2024-04-24 PROCEDURE — 85025 COMPLETE CBC W/AUTO DIFF WBC: CPT

## 2024-04-24 PROCEDURE — 6370000000 HC RX 637 (ALT 250 FOR IP): Performed by: INTERNAL MEDICINE

## 2024-04-24 PROCEDURE — 2580000003 HC RX 258: Performed by: INTERNAL MEDICINE

## 2024-04-24 PROCEDURE — 6370000000 HC RX 637 (ALT 250 FOR IP): Performed by: PHYSICIAN ASSISTANT

## 2024-04-24 PROCEDURE — 36415 COLL VENOUS BLD VENIPUNCTURE: CPT

## 2024-04-24 RX ORDER — ATORVASTATIN CALCIUM 40 MG/1
40 TABLET, FILM COATED ORAL NIGHTLY
Status: DISCONTINUED | OUTPATIENT
Start: 2024-04-24 | End: 2024-04-24

## 2024-04-24 RX ORDER — OXYCODONE HYDROCHLORIDE 5 MG/1
5 TABLET ORAL EVERY 6 HOURS PRN
Status: DISCONTINUED | OUTPATIENT
Start: 2024-04-24 | End: 2024-04-27 | Stop reason: HOSPADM

## 2024-04-24 RX ADMIN — SODIUM CHLORIDE: 9 INJECTION, SOLUTION INTRAVENOUS at 03:36

## 2024-04-24 RX ADMIN — INSULIN LISPRO 2 UNITS: 100 INJECTION, SOLUTION INTRAVENOUS; SUBCUTANEOUS at 12:02

## 2024-04-24 RX ADMIN — INSULIN GLARGINE 25 UNITS: 100 INJECTION, SOLUTION SUBCUTANEOUS at 21:09

## 2024-04-24 RX ADMIN — DOCUSATE SODIUM 100 MG: 100 CAPSULE, LIQUID FILLED ORAL at 08:54

## 2024-04-24 RX ADMIN — COLCHICINE 0.6 MG: 0.6 TABLET, FILM COATED ORAL at 08:54

## 2024-04-24 RX ADMIN — POLYETHYLENE GLYCOL 3350 17 G: 17 POWDER, FOR SOLUTION ORAL at 08:54

## 2024-04-24 RX ADMIN — INSULIN LISPRO 4 UNITS: 100 INJECTION, SOLUTION INTRAVENOUS; SUBCUTANEOUS at 16:44

## 2024-04-24 RX ADMIN — DOCUSATE SODIUM 100 MG: 100 CAPSULE, LIQUID FILLED ORAL at 21:10

## 2024-04-24 RX ADMIN — APIXABAN 2.5 MG: 2.5 TABLET, FILM COATED ORAL at 08:54

## 2024-04-24 RX ADMIN — Medication 1 CAPSULE: at 08:54

## 2024-04-24 RX ADMIN — LEVETIRACETAM 500 MG: 500 TABLET, FILM COATED ORAL at 21:10

## 2024-04-24 RX ADMIN — SODIUM CHLORIDE, PRESERVATIVE FREE 10 ML: 5 INJECTION INTRAVENOUS at 21:14

## 2024-04-24 RX ADMIN — INSULIN LISPRO 4 UNITS: 100 INJECTION, SOLUTION INTRAVENOUS; SUBCUTANEOUS at 21:09

## 2024-04-24 RX ADMIN — AMLODIPINE BESYLATE 10 MG: 10 TABLET ORAL at 08:54

## 2024-04-24 RX ADMIN — SODIUM CHLORIDE, PRESERVATIVE FREE 10 ML: 5 INJECTION INTRAVENOUS at 09:07

## 2024-04-24 RX ADMIN — LEVETIRACETAM 500 MG: 500 TABLET, FILM COATED ORAL at 08:54

## 2024-04-24 RX ADMIN — ASPIRIN 81 MG: 81 TABLET, COATED ORAL at 08:53

## 2024-04-24 ASSESSMENT — PAIN SCALES - GENERAL: PAINLEVEL_OUTOF10: 0

## 2024-04-24 NOTE — DIABETES MGMT
Patient admitted with cardioembolic stroke. Blood glucose ranged 195-301 yesterday with patient receiving Lantus 25 units and Humalog 32 units. Blood glucose this morning was 117. Reviewed patient current regimen: Lantus 25 units nightly, Humalog 8 units ACHS, and Humalog correctional insulin. Patient would likely benefit from reduction in frequency of set dosed humalog to reduce risk of hypoglycemia during the night. Provider updated via [employer] regarding recommendations and patient glycemic control.

## 2024-04-24 NOTE — PROGRESS NOTES
Hospitalist Progress Note   Admit Date:  2024  1:27 PM   Name:  Jayy Johnson   Age:  84 y.o.  Sex:  male  :  1940   MRN:  [id number]   Room:  [facility]/    Presenting/Chief Complaint: Hyperglycemia     Reason(s) for Admission: Hyperglycemia [R73.9]  Right leg weakness [R29.898]  ORLANDO (acute kidney injury) (HCC) [N17.9]     Hospital Course:   Jayy Johnson is a 84 y.o. male with chronic persistent atrial fibrillation (previously on Xarelto though changed to eliquis per cards 24), HTN, DM2, epilepsy and recent cardioembolic CVA presented back to the ER on  with worsening right leg weakness and generalized fatigue after being discharged home on .  He was found w/ ORLANDO and hyponatremia and admitted to the Hospitalist service.  Patient had repeat MRI brains and Neurology recommended mgmt as is done with acute infarcts.  Neurology signed off and requested outpatient follow up. He had nausea prompting a CT a/p which was concerning for pancreatic mass, and liver masses. No known hx of cancer. Now has skin breakdown to sacrum which seems to be new since CVA sequelae.     Subjective & 24hr Events:   Seen with daughter aries at bedside who is feeding him.   \"I am pretty good\"  Tells me his arm feels better but I guess told his daughter otherwsise - most ttp at elbow with flexion at joint  Denies constipation  Denies dyspnea or cough   Afebrile  No new complaints  Isnt making much progress with movement - really isnt moving legs much at all now  No obvious cellulitis but there is some erythema to antecubital fossa   Cmp pending    Assessment & Plan:     History of cardioembolic cerebrovascular accident  // multifocal acute ischemic strokes in the setting of atrial fibrillation   -Continue ASA 81 mg daily  -Renal/age adjusted apixaban started on  - prior to admission he was taken off Xarelto 20mg and put on Eliquis 2.5 mg BID 24 by Cards. It may be best for him to go back on full dose  OAC. Seems this change was made due to expense as patient could get Eliquis from [facility]. However, when his OAC was changed he started having acute CVAs. I will have this conversation with family. But I think to prevent further CVA it would be ideal for him to go back on full dose OAC with xarelto    >> curbside discussion with Cardiology FISH 4/23/24 - these doses are fairly equivalent. Eliquis generally preferred to Xarelto .   -High intensity statin dose decreased, LDL 87. Has been on this since 4/18. Prior was on Pravastatin  -BG goal ; I titrated insulin again. Will see how he does on single dose of glargine.   -Increase amlodipine to improve BP which holding ARB and HCTZ    Concerns for metastatic cancer  Fairly convincing given his CT a/p findings and CEA elevation   He is hypercoaguable because of this.  Will rule out DVT/clot in RUE. He could be clotting on OAC   He does not want aggressive work up/treatment for cancer and understand that his disease is likely metastatic. Patient and family still deciding if they want to pursue IR biopsy to confirm diagnosis.     ORLANDO (acute kidney injury) // Dehydration // REX   On admission creatinine was 1.8 up from baseline of 1.3.  He was given gentle IV fluid.  His ORLANDO was thought due to dehydration.  His hydrochlorothiazide and ARB were held.  He has since gotten contrast and colchicine.  Creatinine is at 1.9.  Will encourage hydration.  CT abdomen pelvis showed no hydronephrosis. He does have hypodensity to the right kidney concerning for a complex cyst, however  -Encourage oral hydration  -follow up CMP from AM   -Hold HCTZ, hold ARB, and increase amlodipine for BP control    Leukocytosis and low grade fevers  Ct a/p concerning for cancer, R elbow is painful concerning for inflammatory arthropathy, Also concerns for new sacral wound, all of which could contribute to leukocytosis  CRP and Sed rate slightly elevated but non-specific  -Bcx from 4/22/24 without  DETECTED NOTDET      Coronavirus 229E by PCR NOT DETECTED NOTDET      Coronavirus HKU1 by PCR NOT DETECTED NOTDET      Coronavirus NL63 by PCR NOT DETECTED NOTDET      Coronavirus OC43 by PCR NOT DETECTED NOTDET      SARS-CoV-2, PCR NOT DETECTED NOTDET      Human Metapneumovirus by PCR NOT DETECTED NOTDET      Rhinovirus Enterovirus PCR NOT DETECTED NOTDET      Influenza A by PCR NOT DETECTED NOTDET      Influenza B PCR NOT DETECTED NOTDET      Parainfluenza 1 PCR NOT DETECTED NOTDET      Parainfluenza 2 PCR NOT DETECTED NOTDET      Parainfluenza 3 PCR NOT DETECTED NOTDET      Parainfluenza 4 PCR NOT DETECTED NOTDET      Respiratory Syncytial Virus by PCR NOT DETECTED NOTDET      Bordetella parapertussis by PCR NOT DETECTED NOTDET      Bordetella pertussis by PCR NOT DETECTED NOTDET      Chlamydophila Pneumonia PCR NOT DETECTED NOTDET      Mycoplasma pneumo by PCR NOT DETECTED NOTDET     POCT Glucose    Collection Time: 04/23/24  4:01 PM   Result Value Ref Range    POC Glucose 260 (H) 65 - 100 mg/dL    Performed by: Allison    Procalcitonin    Collection Time: 04/23/24  4:35 PM   Result Value Ref Range    Procalcitonin 0.42 (H) 0.00 - 0.10 ng/mL   POCT Glucose    Collection Time: 04/23/24  8:19 PM   Result Value Ref Range    POC Glucose 231 (H) 65 - 100 mg/dL    Performed by: Ashley    POCT Glucose    Collection Time: 04/24/24  6:24 AM   Result Value Ref Range    POC Glucose 117 (H) 65 - 100 mg/dL    Performed by: Samaria    CBC with Auto Differential    Collection Time: 04/24/24  6:27 AM   Result Value Ref Range    WBC 13.5 (H) 4.3 - 11.1 K/uL    RBC 3.96 (L) 4.23 - 5.6 M/uL    Hemoglobin 12.6 (L) 13.6 - 17.2 g/dL    Hematocrit 38.3 (L) 41.1 - 50.3 %    MCV 96.7 82 - 102 FL    MCH 31.8 26.1 - 32.9 PG    MCHC 32.9 31.4 - 35.0 g/dL    RDW 12.9 11.9 - 14.6 %    Platelets 215 150 - 450 K/uL    MPV 12.6 (H) 9.4 - 12.3 FL    nRBC 0.00 0.0 - 0.2 K/uL    Differential Type AUTOMATED      Neutrophils % 77 43 -

## 2024-04-24 NOTE — PROGRESS NOTES
Reviewed [facility] conversation - appreciate palliative care. I will order IR consult to see if a liver bx is possible. He would need to be on heparin gtt and taken off OAC. I will wait to do this until I hear from IR/they review images and weigh in. I feel liver bx would be less invasive than EUS with bx.     Also, US of arm showed superficial thrombophlebitis. Warm compresses will be best for that.

## 2024-04-24 NOTE — PROGRESS NOTES
Monitor room called to report pt adriana down to as low as 39. Dr. Ryan Ventura was made aware of these events, no new orders at this time.

## 2024-04-24 NOTE — PROGRESS NOTES
SPEECH LANGUAGE PATHOLOGY: ATTEMPT     NAME: Jayy Johnson  : 1940  MRN: [id number]    ADMISSION DATE: 2024  PRIMARY DIAGNOSIS: Cardioembolic stroke (HCC)    Speech Therapy attempted this PM. Wife at bedside, and patient sleeping soundly in bed. Per wife, patient just returned from procedure and has yet to wake up.   Cognitive-linguistic therapy deferred until patient is more alert. Plan to follow up at later time/date.  Wife in agreement with plan.      Lorraine Gottlieb, RISA  2024 2:27 PM

## 2024-04-24 NOTE — PROGRESS NOTES
Palliative Care Progress Note    Patient: Jayy Johnson MRN: [id number]  SSN: [id number]    YOB: 1940  Age: 84 y.o.  Sex: male       Assessment/Plan:     Chief Complaint/Interval History: Pt seen and examined. He is somnolent after being awake most of the morning. Was able to eat with assistance, work with PT/OT. Is able to open eyes and answer questions, although a delay in communication is present. Patient's two daughters, son, and wife are all present for family meeting.     Meeting held with the patient's family, where we discussed his overall prognosis. Discussed CEA results, CT results and tumor markers that are still pending.  They note patient was complaining of nausea and periumbilical abdominal pain at times prior to CVA. He still had his appetite and did not demonstrate any other symptoms that would have them assume he had cancer.     His family did bring in HCPOA forms.     Principal Diagnosis:    Debility, Unspecified  R53.81     Additional Diagnoses:   Frailty  R54  Seizures, Convulsions  R56.9  Encounter for Palliative Care  Z51.5  Recurrent CVAs in setting of likely metastatic cancer    Palliative Performance Scale (PPS)   50    Medical Decision Making:   Reviewed and summarized last 24 hours.   Discussed case with appropriate providers. Notified hospitalist and case mgmt of discussion below.     I was able to have a family meeting today with the patient's son, daughters and wife. We started with summarizing events thus far that lead him to the hospital for initial admission and then current admission. We discussed CT findings and correlated findings with CEA. 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 Unfortunately, AFP and  are still pending, but would hopefully point us in a more specific direction. We discussed that likely his underlying cancer has contributed to these thrombotic events. Also discussed potential for treatment. They do not believe he would want

## 2024-04-24 NOTE — PLAN OF CARE
Problem: Safety - Adult  Goal: Free from fall injury  4/23/2024 2215 by Edita Harding RN  Outcome: Progressing  Flowsheets (Taken 4/23/2024 2000)  Free From Fall Injury: Instruct family/caregiver on patient safety  4/23/2024 1502 by Yocasta Page RN  Outcome: Progressing  Flowsheets (Taken 4/23/2024 0800)  Free From Fall Injury: Instruct family/caregiver on patient safety     Problem: Discharge Planning  Goal: Discharge to home or other facility with appropriate resources  4/23/2024 2215 by Edita Harding RN  Outcome: Progressing  Flowsheets (Taken 4/23/2024 2000)  Discharge to home or other facility with appropriate resources: Identify barriers to discharge with patient and caregiver  4/23/2024 1502 by Yocasta Page RN  Outcome: Progressing  Flowsheets (Taken 4/23/2024 0800)  Discharge to home or other facility with appropriate resources: Identify barriers to discharge with patient and caregiver     Problem: Pain  Goal: Verbalizes/displays adequate comfort level or baseline comfort level  4/23/2024 2215 by Edita Harding RN  Outcome: Progressing  4/23/2024 1502 by Yocasta Page RN  Outcome: Progressing  Flowsheets (Taken 4/23/2024 1200)  Verbalizes/displays adequate comfort level or baseline comfort level: Encourage patient to monitor pain and request assistance     Problem: Skin/Tissue Integrity  Goal: Absence of new skin breakdown  Description: 1.  Monitor for areas of redness and/or skin breakdown  2.  Assess vascular access sites hourly  3.  Every 4-6 hours minimum:  Change oxygen saturation probe site  4.  Every 4-6 hours:  If on nasal continuous positive airway pressure, respiratory therapy assess nares and determine need for appliance change or resting period.  4/23/2024 2215 by Edita Harding RN  Outcome: Progressing  4/23/2024 1502 by Yocasta Page RN  Outcome: Progressing     Problem: Neurosensory - Adult  Goal: Achieves stable or improved neurological status  4/23/2024 2215 by Meaghan

## 2024-04-25 LAB
ALBUMIN SERPL-MCNC: 1.9 G/DL (ref 3.2–4.6)
ALBUMIN/GLOB SERPL: 0.5 (ref 1–1.9)
ALP SERPL-CCNC: 641 U/L (ref 40–129)
ALT SERPL-CCNC: 104 U/L (ref 12–65)
ANION GAP SERPL CALC-SCNC: 11 MMOL/L (ref 9–18)
AST SERPL-CCNC: 116 U/L (ref 15–37)
BILIRUB SERPL-MCNC: 1 MG/DL (ref 0–1.2)
BUN SERPL-MCNC: 43 MG/DL (ref 8–23)
CALCIUM SERPL-MCNC: 8.4 MG/DL (ref 8.8–10.2)
CHLORIDE SERPL-SCNC: 107 MMOL/L (ref 98–107)
CO2 SERPL-SCNC: 20 MMOL/L (ref 20–28)
CREAT SERPL-MCNC: 1.37 MG/DL (ref 0.8–1.3)
ERYTHROCYTE [DISTWIDTH] IN BLOOD BY AUTOMATED COUNT: 12.8 % (ref 11.9–14.6)
GLOBULIN SER CALC-MCNC: 3.6 G/DL (ref 2.3–3.5)
GLUCOSE BLD STRIP.AUTO-MCNC: 211 MG/DL (ref 65–100)
GLUCOSE BLD STRIP.AUTO-MCNC: 226 MG/DL (ref 65–100)
GLUCOSE BLD STRIP.AUTO-MCNC: 268 MG/DL (ref 65–100)
GLUCOSE BLD STRIP.AUTO-MCNC: 289 MG/DL (ref 65–100)
GLUCOSE SERPL-MCNC: 223 MG/DL (ref 70–99)
HCT VFR BLD AUTO: 40.8 % (ref 41.1–50.3)
HGB BLD-MCNC: 13.4 G/DL (ref 13.6–17.2)
MCH RBC QN AUTO: 30.9 PG (ref 26.1–32.9)
MCHC RBC AUTO-ENTMCNC: 32.8 G/DL (ref 31.4–35)
MCV RBC AUTO: 94.2 FL (ref 82–102)
NRBC # BLD: 0 K/UL (ref 0–0.2)
PLATELET # BLD AUTO: 238 K/UL (ref 150–450)
PMV BLD AUTO: 12.2 FL (ref 9.4–12.3)
POTASSIUM SERPL-SCNC: 4.1 MMOL/L (ref 3.5–5.1)
PROT SERPL-MCNC: 5.5 G/DL (ref 6.3–8.2)
RBC # BLD AUTO: 4.33 M/UL (ref 4.23–5.6)
SERVICE CMNT-IMP: ABNORMAL
SODIUM SERPL-SCNC: 137 MMOL/L (ref 136–145)
WBC # BLD AUTO: 12.4 K/UL (ref 4.3–11.1)

## 2024-04-25 PROCEDURE — 2580000003 HC RX 258: Performed by: INTERNAL MEDICINE

## 2024-04-25 PROCEDURE — 85027 COMPLETE CBC AUTOMATED: CPT

## 2024-04-25 PROCEDURE — 86140 C-REACTIVE PROTEIN: CPT

## 2024-04-25 PROCEDURE — 6370000000 HC RX 637 (ALT 250 FOR IP): Performed by: INTERNAL MEDICINE

## 2024-04-25 PROCEDURE — 82962 GLUCOSE BLOOD TEST: CPT

## 2024-04-25 PROCEDURE — 97110 THERAPEUTIC EXERCISES: CPT

## 2024-04-25 PROCEDURE — 97112 NEUROMUSCULAR REEDUCATION: CPT

## 2024-04-25 PROCEDURE — 99232 SBSQ HOSP IP/OBS MODERATE 35: CPT | Performed by: NURSE PRACTITIONER

## 2024-04-25 PROCEDURE — 92526 ORAL FUNCTION THERAPY: CPT

## 2024-04-25 PROCEDURE — 1100000000 HC RM PRIVATE

## 2024-04-25 PROCEDURE — 80053 COMPREHEN METABOLIC PANEL: CPT

## 2024-04-25 PROCEDURE — 36415 COLL VENOUS BLD VENIPUNCTURE: CPT

## 2024-04-25 PROCEDURE — 6370000000 HC RX 637 (ALT 250 FOR IP): Performed by: NURSE PRACTITIONER

## 2024-04-25 PROCEDURE — 92507 TX SP LANG VOICE COMM INDIV: CPT

## 2024-04-25 PROCEDURE — 6370000000 HC RX 637 (ALT 250 FOR IP): Performed by: PHYSICIAN ASSISTANT

## 2024-04-25 RX ORDER — INSULIN LISPRO 100 [IU]/ML
4 INJECTION, SOLUTION INTRAVENOUS; SUBCUTANEOUS
Status: DISCONTINUED | OUTPATIENT
Start: 2024-04-25 | End: 2024-04-27 | Stop reason: HOSPADM

## 2024-04-25 RX ADMIN — AMLODIPINE BESYLATE 10 MG: 10 TABLET ORAL at 08:38

## 2024-04-25 RX ADMIN — LEVETIRACETAM 500 MG: 500 TABLET, FILM COATED ORAL at 20:31

## 2024-04-25 RX ADMIN — SODIUM CHLORIDE, PRESERVATIVE FREE 5 ML: 5 INJECTION INTRAVENOUS at 09:06

## 2024-04-25 RX ADMIN — INSULIN LISPRO 4 UNITS: 100 INJECTION, SOLUTION INTRAVENOUS; SUBCUTANEOUS at 12:50

## 2024-04-25 RX ADMIN — ASPIRIN 81 MG: 81 TABLET, COATED ORAL at 08:38

## 2024-04-25 RX ADMIN — LEVETIRACETAM 500 MG: 500 TABLET, FILM COATED ORAL at 08:38

## 2024-04-25 RX ADMIN — Medication 1 CAPSULE: at 08:38

## 2024-04-25 RX ADMIN — INSULIN LISPRO 2 UNITS: 100 INJECTION, SOLUTION INTRAVENOUS; SUBCUTANEOUS at 12:50

## 2024-04-25 RX ADMIN — INSULIN LISPRO 4 UNITS: 100 INJECTION, SOLUTION INTRAVENOUS; SUBCUTANEOUS at 17:42

## 2024-04-25 RX ADMIN — DOCUSATE SODIUM 100 MG: 100 CAPSULE, LIQUID FILLED ORAL at 08:38

## 2024-04-25 RX ADMIN — DOCUSATE SODIUM 100 MG: 100 CAPSULE, LIQUID FILLED ORAL at 20:31

## 2024-04-25 RX ADMIN — INSULIN LISPRO 2 UNITS: 100 INJECTION, SOLUTION INTRAVENOUS; SUBCUTANEOUS at 08:38

## 2024-04-25 RX ADMIN — INSULIN LISPRO 4 UNITS: 100 INJECTION, SOLUTION INTRAVENOUS; SUBCUTANEOUS at 17:43

## 2024-04-25 RX ADMIN — INSULIN GLARGINE 25 UNITS: 100 INJECTION, SOLUTION SUBCUTANEOUS at 20:30

## 2024-04-25 RX ADMIN — SODIUM CHLORIDE, PRESERVATIVE FREE 10 ML: 5 INJECTION INTRAVENOUS at 20:31

## 2024-04-25 ASSESSMENT — PAIN SCALES - GENERAL: PAINLEVEL_OUTOF10: 0

## 2024-04-25 NOTE — DIABETES MGMT
Patient admitted with cardioembolic stroke. Blood glucose ranged 117-363 yesterday with patient receiving Lantus 25 units and Humalog 10 units. Blood glucose this morning was 211. Creatinine 1.37. GFR 51. Reviewed patient current regimen: Lantus 25 units nightly and Humalog correctional insulin. Patient would likely benefit from initiation of prandial insulin to help offset hyperglycemia during the day related to caloric intake. Provider updated via [employer] regarding recommendations and patient glycemic control.

## 2024-04-25 NOTE — PROGRESS NOTES
ACUTE PHYSICAL THERAPY GOALS:   (Developed with and agreed upon by patient and/or caregiver.)  (1.) Jayy Johnson  will move from supine to sit and sit to supine , scoot up and down, and roll side to side with INDEPENDENT within 7 treatment day(s).    (2.) Jayy Johnson will transfer from bed to chair and chair to bed with Contact guard assist using the least restrictive device within 7 treatment day(s).    (3.) Jayy Johnson will ambulate with min assist for 100 feet with the least restrictive device within 7 treatment day(s).   (4.) Jayy Johnson will perform standing static and dynamic balance activities x 5 minutes with min assist to improve safety within 7 treatment day(s).  (5.) Jayy Johnson will ascend and descend 1 stairs using no hand rail(s) with min assist to improve functional mobility and safety within 7 treatment day(s).  (6.) Jayy Johnson will perform therapeutic exercises x 10 min for HEP with SUPERVISION to improve strength, endurance, and functional mobility within 7 treatment day(s).      PHYSICAL THERAPY: Daily Note AM   (Link to Caseload Tracking: PT Visit Days : 4  Time In/Out PT Charge Capture  Rehab Caseload Tracker  Orders    Jayy Johnson is a 84 y.o. male   PRIMARY DIAGNOSIS: Cardioembolic stroke (HCC)  Hyperglycemia [R73.9]  Right leg weakness [R29.898]  ORLANDO (acute kidney injury) (HCC) [N17.9]     Inpatient: Payor: MEDICARE / Plan: MEDICARE PART A AND B / Product Type: *No Product type* /     ASSESSMENT:     REHAB RECOMMENDATIONS:   Recommendation to date pending progress:  Setting:  Plan is for home with hospice    Equipment:    To Be Determined     ASSESSMENT:  Mr. Johnson presents resting in bed, agreeable to therapy. RLE is drawn into flexor tone, however therapist able to break it through manual input and steady stretching/PROM/tactile facilitation. Various positions and ranges of RLE facilitated to decrease tone and increase ROM and mobility. Pt  Total=Total Assistance, NT=Not Tested    BALANCE: Good Fair+ Fair Fair- Poor NT Comments   Sitting Static [] [] [] [x] [] []    Sitting Dynamic [] [] [] [x] [x] []              Standing Static [] [] [] [] [x] []    Standing Dynamic [] [] [] [] [x] []      GAIT: I Mod I S SBA CGA Min Mod Max Total  NT x2 Comments:   Level of Assistance [] [] [] [] [] [] [] [] [] [x] []    Distance       DME N/A    Gait Quality N/A    Weightbearing Status      Stairs      I=Independent, Mod I=Modified Independent, S=Supervision, SBA=Standby Assistance, CGA=Contact Guard Assistance,   Min=Minimal Assistance, Mod=Moderate Assistance, Max=Maximal Assistance, Total=Total Assistance, NT=Not Tested    PLAN:   FREQUENCY AND DURATION: 3 times/week for duration of hospital stay or until stated goals are met, whichever comes first.    TREATMENT:   TREATMENT:   Co-Treatment PT/OT necessary due to patient's decreased overall endurance/tolerance levels, as well as need for high level skilled assistance to complete functional transfers/mobility and functional tasks  Neuromuscular Re-education (30 Minutes): Neuromuscular Re-education included Coordination training, Hemibody awareness training, and LE PROM and manual, tactile, proprioceptive input to improve Coordination, Proprioception, and tone.    TREATMENT GRID:  [facility] Access Code: [email or web address]    AFTER TREATMENT PRECAUTIONS: All reanna prominences off-loaded, Alarm Activated, Bed, Bed/Chair Locked, Call light within reach, Needs within reach, RN notified, and Visitors at bedside    INTERDISCIPLINARY COLLABORATION:  RN/ PCT, PT/ PTA, and OT/ MCFADDEN    EDUCATION:      TIME IN/OUT:  Time In: 1120  Time Out: 1150  Minutes: 30    Luma Noble, PT

## 2024-04-25 NOTE — PROGRESS NOTES
GOALS:  LTG: Patient will maintain adequate hydration/nutrition with optimum safety and efficiency of swallowing function with PO intake without overt signs or symptoms of aspiration for the highest appropriate diet level.  STG:  Patient will consume regular consistency textures and thin liquids without overt signs or symptoms of airway compromise. Ongoing 2024    LTG: Patient will increase receptive/expressive language skills demonstrated by the ability to communicate basic wants/needs across environments.   STG:  Patient will participate in speech/language evaluation, as indicated, with 100% compliance. Completed 2024  Patient will display orientation to place and time with 60% accuracy with assistance of external aids. Progressing 2024  Patient will recall interactions within current environment for a 24 hour interval with 75% accuracy given min A. Progressing 2024    SPEECH LANGUAGE PATHOLOGY: Dysphagia and Cognitive- Communicative daily treatment 1    Acknowledge Order  I  Therapy Time  I   Charges     I  Rehab Caseload Tracker      NAME: Jayy Johnson  : 1940  MRN: [id number]    ADMISSION DATE: 2024  PRIMARY DIAGNOSIS: Cardioembolic stroke (HCC)    ICD-10: Treatment Diagnosis: R13.12 Dysphagia, Oropharyngeal Phase    RECOMMENDATIONS   Diet:    Regular Consistency  Thin Liquids    Medication: presented one at a time    Patient does exhibit deficits with attention, orientation, memory, and insight. Patient/ family working to determine direction of care. Recommend speech therapy post acute care if patient/ family wanting rehabilitation post acute care.    Compensatory Swallowing Strategies:   Alternate solids and liquids  Remain upright for 30-45 minutes after meals  Small bites/sips  Total feed  Upright as possible for all oral intake    Compensatory Cognitive-Communicative Strategies:   Frequent reorientation to place, time, and situation.   One step verbally issued commands

## 2024-04-25 NOTE — PROGRESS NOTES
Palliative Care Progress Note    Patient: Jayy Johnson MRN: [id number]  SSN: [id number]    YOB: 1940  Age: 84 y.o.  Sex: male       Assessment/Plan:     Chief Complaint/Interval History: sleeping comfortably.  Awaiting IR evaluation     Principal Diagnosis:    Debility, Unspecified  R53.81     Additional Diagnoses:   Frailty  R54  Seizures, Convulsions  R56.9  Encounter for Palliative Care  Z51.5  Recurrent CVAs in setting of likely metastatic cancer    Palliative Performance Scale (PPS)   50    Medical Decision Making:   Reviewed and summarized notes from last 24 hours.   Discussed case with appropriate providers.  Reviewed labs from last 24 hours     Pt sleeping, appears comfortable.  Daughter in law at bedside.  Pt attempted to open his eyes to voice, but was unsuccessful.  When asked if he was tired, he was able to weakly say yes.  Discussed plan of care with DIL.  IR will be evaluating pt for possible liver biopsy.  Per DIL, pt ate breakfast this morning, and tolerated well.  Will need to address code status with pt's wife, Taryn.  Of note, pt's CA 19-9 is greater than 10,000, and AFP is 1.82.  Will continue to follow.     Will discuss findings with members of the interdisciplinary team.       More than 50% of this 35 minute visit was spent counseling and coordination of care as outlined above.    Subjective:     Review of Systems:  Review of systems not obtained due to patient factors. Pt sleeping most of visit     Objective:     Visit Vitals  BP (!) 160/72   Pulse 90   Temp 98.2 °F (36.8 °C) (Axillary)   Resp 18   Ht 1.651 m (5' 5\")   Wt 94.3 kg (208 lb)   SpO2 95%   BMI 34.61 kg/m²       Physical Exam:    General:  Sleeping. No acute distress.   Eyes:  Conjunctivae/corneas clear    Nose: Nares normal. Septum midline.   Neck: Supple, symmetrical, trachea midline   Lungs:   Unlabored   Heart:  Regular rate    Abdomen:   Soft, non-tender, non-distended   Extremities: Normal, atraumatic, no

## 2024-04-25 NOTE — PLAN OF CARE
Problem: Safety - Adult  Goal: Free from fall injury  Outcome: Progressing     Problem: Discharge Planning  Goal: Discharge to home or other facility with appropriate resources  Outcome: Progressing     Problem: Pain  Goal: Verbalizes/displays adequate comfort level or baseline comfort level  Outcome: Progressing     Problem: Skin/Tissue Integrity  Goal: Absence of new skin breakdown  Description: 1.  Monitor for areas of redness and/or skin breakdown  2.  Assess vascular access sites hourly  3.  Every 4-6 hours minimum:  Change oxygen saturation probe site  4.  Every 4-6 hours:  If on nasal continuous positive airway pressure, respiratory therapy assess nares and determine need for appliance change or resting period.  Outcome: Progressing     Problem: Neurosensory - Adult  Goal: Achieves stable or improved neurological status  Outcome: Progressing     Problem: Respiratory - Adult  Goal: Achieves optimal ventilation and oxygenation  Outcome: Progressing     Problem: Cardiovascular - Adult  Goal: Maintains optimal cardiac output and hemodynamic stability  Outcome: Progressing     Problem: Skin/Tissue Integrity - Adult  Goal: Skin integrity remains intact  Outcome: Progressing     Problem: Musculoskeletal - Adult  Goal: Return mobility to safest level of function  Outcome: Progressing     Problem: Gastrointestinal - Adult  Goal: Minimal or absence of nausea and vomiting  Outcome: Progressing     Problem: Genitourinary - Adult  Goal: Absence of urinary retention  Outcome: Progressing     Problem: Infection - Adult  Goal: Absence of infection at discharge  Outcome: Progressing     Problem: Metabolic/Fluid and Electrolytes - Adult  Goal: Electrolytes maintained within normal limits  Outcome: Progressing  Goal: Hemodynamic stability and optimal renal function maintained  Outcome: Progressing  Goal: Glucose maintained within prescribed range  Outcome: Progressing     Problem: Hematologic - Adult  Goal: Maintains

## 2024-04-25 NOTE — PLAN OF CARE
Problem: Safety - Adult  Goal: Free from fall injury  4/24/2024 2205 by Edita Harding RN  Outcome: Progressing  Flowsheets (Taken 4/24/2024 2020)  Free From Fall Injury: Instruct family/caregiver on patient safety  4/24/2024 1033 by Yocasta Page RN  Outcome: Progressing  Flowsheets (Taken 4/24/2024 0800)  Free From Fall Injury: Instruct family/caregiver on patient safety     Problem: Discharge Planning  Goal: Discharge to home or other facility with appropriate resources  4/24/2024 2205 by Edita Harding RN  Outcome: Progressing  Flowsheets (Taken 4/24/2024 2022)  Discharge to home or other facility with appropriate resources: Identify barriers to discharge with patient and caregiver  4/24/2024 1033 by Yocasta Page RN  Outcome: Progressing  Flowsheets (Taken 4/24/2024 0800)  Discharge to home or other facility with appropriate resources: Identify barriers to discharge with patient and caregiver     Problem: Pain  Goal: Verbalizes/displays adequate comfort level or baseline comfort level  4/24/2024 2205 by Edita Harding RN  Outcome: Progressing  4/24/2024 1033 by Yocasta Page RN  Outcome: Progressing     Problem: Skin/Tissue Integrity  Goal: Absence of new skin breakdown  Description: 1.  Monitor for areas of redness and/or skin breakdown  2.  Assess vascular access sites hourly  3.  Every 4-6 hours minimum:  Change oxygen saturation probe site  4.  Every 4-6 hours:  If on nasal continuous positive airway pressure, respiratory therapy assess nares and determine need for appliance change or resting period.  4/24/2024 2205 by Edita Harding RN  Outcome: Progressing  4/24/2024 1033 by Yocasta Page RN  Outcome: Progressing     Problem: Neurosensory - Adult  Goal: Achieves stable or improved neurological status  4/24/2024 2205 by Edita Harding RN  Outcome: Progressing  Flowsheets (Taken 4/24/2024 2022)  Achieves stable or improved neurological status: Assess for and report changes in neurological  team  7. Initiate Consults from Ethics, Palliative Care or initiate Family Care Conference as is appropriate  4/24/2024 2205 by Edita Harding, RN  Outcome: Progressing  4/24/2024 1033 by Yocasta Page, RN  Outcome: Progressing  Flowsheets (Taken 4/24/2024 0800)  Patient/family able to effectively weigh alternatives and participate in decision making related to treatment and care: Determine when there are differences between patient's view, family's view, and healthcare provider's view of condition

## 2024-04-25 NOTE — PROGRESS NOTES
ACUTE OCCUPATIONAL THERAPY GOALS:   (Developed with and agreed upon by patient and/or caregiver.)  1. Patient will feed self entire meal with either and adaptive utensils as needed.   2. Patient will complete upper body dressing and bathing with mod A and adaptive equipment as needed.   3. Patient will participate in BUE therapeutic exercises to increase strength in R UEs to at least 3/5 for participation in ADLs and functional transfers.   4. Patient will participate in BUE therapeutic activities to increase coordination in R UE to WFL for bimanual fine motor ADLs.   5. Patient will attend to R and L side for 100% of treatment session with less than 2 verbal cues from therapist.   6. Patient will complete functional transfers with mod A and adaptive equipment as needed.   7. Patient will complete grooming with mod A in supported sitting at sink level with mod verbal cues.     Timeframe: 7 visits      OCCUPATIONAL THERAPY: Daily Note PM   OT Visit Days: 2   Time In/Out  OT Charge Capture  Rehab Caseload Tracker  OT Orders    Jayy Johnson is a 84 y.o. male   PRIMARY DIAGNOSIS: Cardioembolic stroke (HCC)  Hyperglycemia [R73.9]  Right leg weakness [R29.898]  [place] (acute kidney injury) (HCC) [N17.9]       Inpatient: Payor: MEDICARE / Plan: MEDICARE PART A AND B / Product Type: *No Product type* /     ASSESSMENT:     REHAB RECOMMENDATIONS:   Recommendation to date pending progress:  Setting:  Plan is home with hospice    Equipment:    To Be Determined--hospital bed     ASSESSMENT:  Mr. Johnson presents in supine after speech therapy, drowsy but still awake. Family is currently planning on taking pt home with hospice and family support, requesting education and resources regarding what they can do to help pt in his current state. Therapist facilitated CHER DALTON, PROM BUE, manual therapy, positioning to prevent bedsores and positioning for edema management. Increased time due to pt weakness, stiffness, alertness and  Bathing [] [] [] [] [] [] [] [] [] [x]     Lower Body Bathing [] [] [] [] [] [] [] [] [] [x]     Toileting [] [] [] [] [] [] [] [] [] [x]    Upper Body Dressing [] [] [] [] [] [] [] [] [] [x]    Lower Body Dressing [] [] [] [] [] [] [] [] [] [x]    Feeding [] [] [] [] [] [] [] [] [] [x]    Grooming [] [] [] [] [] [] [] [] [] [x]    Personal Device Care [] [] [] [] [] [] [] [] [] [x]    Functional Mobility [] [] [] [] [] [] [] [] [] [x]    I=Independent, Mod I=Modified Independent, S=Supervision/Setup, SBA=Standby Assistance, CGA=Contact Guard Assistance, Min=Minimal Assistance, Mod=Moderate Assistance, Max=Maximal Assistance, Total=Total Assistance, NT=Not Tested    BALANCE: Good Fair+ Fair Fair- Poor NT Comments   Sitting Static [] [] [] [] [] [x]    Sitting Dynamic [] [] [] [] [] [x]              Standing Static [] [] [] [] [] [x]    Standing Dynamic [] [] [] [] [] [x]        PLAN:     FREQUENCY/DURATION   OT Plan of Care: 3 times/week for duration of hospital stay or until stated goals are met, whichever comes first.    TREATMENT:     TREATMENT:   Co-Treatment PT/OT necessary due to patient's decreased overall endurance/tolerance levels, as well as need for high level skilled assistance to complete functional transfers/mobility and functional tasks  Therapeutic Exercise (30 Minutes): Therapeutic exercises noted below to improve functional strength, mobility, and AAROM, PROM, positioning to be able to functionally participate, positioning to prevent edema to be able to participate in mobility.     TREATMENT GRID:  N/A    All exercises, ROM and education provided found in [place] [facility] Access code: [email or web address]      AFTER TREATMENT PRECAUTIONS: Alarm Activated, Bed, Call light within reach, Needs within reach, RN notified, and Visitors at bedside    INTERDISCIPLINARY COLLABORATION:  RN/ PCT, PT/ PTA, and OT/ MCFADDEN    EDUCATION:       TOTAL TREATMENT DURATION AND TIME:  Time In: 1322  Time Out: 1402  Minutes:

## 2024-04-25 NOTE — PROGRESS NOTES
Hospitalist Progress Note   Admit Date:  2024  1:27 PM   Name:  Jayy Johnson   Age:  84 y.o.  Sex:  male  :  1940   MRN:  [id number]   Room:  [facility]/    Presenting/Chief Complaint: Hyperglycemia     Reason(s) for Admission: Hyperglycemia [R73.9]  Right leg weakness [R29.898]  [place] (acute kidney injury) (HCC) [N17.9]     Hospital Course:   Jayy Johnson is an 84 y.o. CM with chronic persistent atrial fibrillation (previously on Xarelto though changed to Eliquis per Cardiology on 24), HTN, DM2, epilepsy and recent cardioembolic CVA presented back to the ER on  with worsening right leg weakness and generalized fatigue after being discharged home on .  He was found w/ OR[place]O and hyponatremia and admitted to the Hospitalist service.  Patient had repeat MRI brains and Neurology recommended mgmt as is done with acute infarcts.  Neurology signed off and requested outpatient follow up.  He had nausea prompting a CT a/p which was concerning for pancreatic mass and liver masses.  No known hx of cancer.  Now has skin breakdown to sacrum which seems to be new since CVA sequelae.     Subjective & 24hr Events:   I saw the patient and his daughter-in-law Inés at the bedside.  I then spoke with daughter Arielle over the phone to discuss the family's desires and the planning going forward.  I expect a liver biopsy will be necessary.  Consult IR was placed yesterday.  Palliative care is on board.  We will start prandial insulin today to try to establish better glucose control.    Assessment & Plan:     History of cardioembolic cerebrovascular accident  // multifocal acute ischemic strokes in the setting of atrial fibrillation   -Continue ASA 81 mg daily  -Renal/age adjusted apixaban started on  - prior to admission he was taken off Xarelto 20mg and put on Eliquis 2.5 mg BID 24.                >> curbside discussion with Cardiology FISH 24 - these doses are fairly equivalent. Eliquis  Essential hypertension, benign    Urinary retention    Bilateral carotid artery stenosis    Type 2 diabetes mellitus (HCC)    Epilepsy (HCC)    History of cardioembolic cerebrovascular accident (CVA)    Right leg weakness    Hyponatremia    Dehydration    Leukocytosis    Right middle lobe pneumonia    Pancreatic mass    Lesion of spleen    Cyst of right kidney    Nausea    Concern about cancer without diagnosis    Secondary hypercoagulable state (HCC)    Superficial thrombophlebitis of right upper extremity  Resolved Problems:    * No resolved hospital problems. *      Objective:   Patient Vitals for the past 24 hrs:   Temp Pulse Resp BP SpO2   04/25/24 1136 97.9 °F (36.6 °C) 71 18 (!) 136/96 95 %   04/25/24 0807 98.2 °F (36.8 °C) 90 18 (!) 160/72 95 %   04/25/24 0400 97.9 °F (36.6 °C) 90 16 (!) 146/83 96 %   04/25/24 0000 100 °F (37.8 °C) 89 16 135/64 96 %   04/24/24 2309 -- 62 -- -- --   04/24/24 2000 98.2 °F (36.8 °C) 78 16 (!) 151/77 96 %   04/24/24 1600 98.4 °F (36.9 °C) 86 20 (!) 146/58 97 %       Oxygen Therapy  SpO2: 95 %  Pulse Oximetry Type: Intermittent  Pulse via Oximetry: 97 beats per minute  Pulse Oximeter Device Mode: Intermittent  O2 Device: None (Room air)    Estimated body mass index is 34.61 kg/m² as calculated from the following:    Height as of this encounter: 1.651 m (5' 5\").    Weight as of this encounter: 94.3 kg (208 lb).    Intake/Output Summary (Last 24 hours) at 4/25/2024 1334  Last data filed at 4/25/2024 0807  Gross per 24 hour   Intake 240 ml   Output 800 ml   Net -560 ml         Physical Exam:   General:    No cardiopulmonary distress  Head:  Normocephalic, atraumatic  ENT:  Nares appear normal.  Moist oral mucosa  Lungs:   No wheezing.  Symmetric expansion.  Skin:     No rashes.  Normal coloration.   Neuro:  Alert, follows commands  Psych:  Normal mood and affect.      I have personally reviewed labs and tests:  Recent Labs:  Recent Results (from the past 48 hour(s))   Urinalysis w  mg/dL    Performed by: Bud        Recent Labs     04/23/24  1542   COVID19 NOT DETECTED       Current Meds:  Current Facility-Administered Medications   Medication Dose Route Frequency    [START ON 4/26/2024] apixaban (ELIQUIS) tablet 5 mg  5 mg Oral BID    insulin lispro (HUMALOG) injection vial 4 Units  4 Units SubCUTAneous TID WC    oxyCODONE (ROXICODONE) immediate release tablet 5 mg  5 mg Oral Q6H PRN    amLODIPine (NORVASC) tablet 10 mg  10 mg Oral Daily    insulin glargine (LANTUS) injection vial 25 Units  25 Units SubCUTAneous Nightly    insulin lispro (HUMALOG) injection vial 0-8 Units  0-8 Units SubCUTAneous TID WC    insulin lispro (HUMALOG) injection vial 0-4 Units  0-4 Units SubCUTAneous Nightly    diatrizoate meglumine-sodium (GASTROGRAFIN) 66-10 % solution 15 mL  15 mL Oral ONCE PRN    albuterol sulfate HFA (PROVENTIL;VENTOLIN;PROAIR) 108 (90 Base) MCG/ACT inhaler 2 puff  2 puff Inhalation Q6H PRN    promethazine (PHENERGAN) tablet 12.5 mg  12.5 mg Oral Q6H PRN    lactobacillus (CULTURELLE) capsule 1 capsule  1 capsule Oral Daily with breakfast    acetaminophen (TYLENOL) tablet 650 mg  650 mg Oral Q6H PRN    aspirin EC tablet 81 mg  81 mg Oral Daily    docusate sodium (COLACE) capsule 100 mg  100 mg Oral BID    [Held by provider] hydroCHLOROthiazide capsule 12.5 mg  12.5 mg Oral Daily    levETIRAcetam (KEPPRA) tablet 500 mg  500 mg Oral BID    [Held by provider] losartan (COZAAR) tablet 100 mg  100 mg Oral Daily    polyethylene glycol (GLYCOLAX) packet 17 g  17 g Oral Daily    sodium chloride flush 0.9 % injection 5-40 mL  5-40 mL IntraVENous 2 times per day    sodium chloride flush 0.9 % injection 5-40 mL  5-40 mL IntraVENous PRN    0.9 % sodium chloride infusion   IntraVENous PRN    ondansetron (ZOFRAN-ODT) disintegrating tablet 4 mg  4 mg Oral Q8H PRN    Or    ondansetron (ZOFRAN) injection 4 mg  4 mg IntraVENous Q6H PRN    bisacodyl (DULCOLAX) suppository 10 mg  10 mg Rectal Daily PRN

## 2024-04-26 ENCOUNTER — APPOINTMENT (OUTPATIENT)
Dept: ULTRASOUND IMAGING | Age: 84
DRG: 064 | End: 2024-04-26
Payer: MEDICARE

## 2024-04-26 LAB
ANION GAP SERPL CALC-SCNC: 9 MMOL/L (ref 9–18)
BUN SERPL-MCNC: 43 MG/DL (ref 8–23)
CALCIUM SERPL-MCNC: 8.4 MG/DL (ref 8.8–10.2)
CHLORIDE SERPL-SCNC: 108 MMOL/L (ref 98–107)
CO2 SERPL-SCNC: 21 MMOL/L (ref 20–28)
CREAT SERPL-MCNC: 1.33 MG/DL (ref 0.8–1.3)
GLUCOSE BLD STRIP.AUTO-MCNC: 103 MG/DL (ref 65–100)
GLUCOSE BLD STRIP.AUTO-MCNC: 120 MG/DL (ref 65–100)
GLUCOSE BLD STRIP.AUTO-MCNC: 204 MG/DL (ref 65–100)
GLUCOSE BLD STRIP.AUTO-MCNC: 218 MG/DL (ref 65–100)
GLUCOSE BLD STRIP.AUTO-MCNC: 93 MG/DL (ref 65–100)
GLUCOSE SERPL-MCNC: 165 MG/DL (ref 70–99)
POTASSIUM SERPL-SCNC: 4.3 MMOL/L (ref 3.5–5.1)
SERVICE CMNT-IMP: ABNORMAL
SERVICE CMNT-IMP: NORMAL
SODIUM SERPL-SCNC: 138 MMOL/L (ref 136–145)

## 2024-04-26 PROCEDURE — 36415 COLL VENOUS BLD VENIPUNCTURE: CPT

## 2024-04-26 PROCEDURE — 88313 SPECIAL STAINS GROUP 2: CPT

## 2024-04-26 PROCEDURE — 1100000000 HC RM PRIVATE

## 2024-04-26 PROCEDURE — 2709999900 US BIOPSY LIVER PERCUTANEOUS

## 2024-04-26 PROCEDURE — 47000 NEEDLE BIOPSY OF LIVER PERQ: CPT | Performed by: RADIOLOGY

## 2024-04-26 PROCEDURE — 0FB03ZX EXCISION OF LIVER, PERCUTANEOUS APPROACH, DIAGNOSTIC: ICD-10-PCS | Performed by: PHYSICIAN ASSISTANT

## 2024-04-26 PROCEDURE — 51702 INSERT TEMP BLADDER CATH: CPT

## 2024-04-26 PROCEDURE — 88341 IMHCHEM/IMCYTCHM EA ADD ANTB: CPT

## 2024-04-26 PROCEDURE — 99152 MOD SED SAME PHYS/QHP 5/>YRS: CPT

## 2024-04-26 PROCEDURE — 6360000002 HC RX W HCPCS: Performed by: RADIOLOGY

## 2024-04-26 PROCEDURE — 6360000002 HC RX W HCPCS: Performed by: INTERNAL MEDICINE

## 2024-04-26 PROCEDURE — 6370000000 HC RX 637 (ALT 250 FOR IP): Performed by: INTERNAL MEDICINE

## 2024-04-26 PROCEDURE — 82962 GLUCOSE BLOOD TEST: CPT

## 2024-04-26 PROCEDURE — 88342 IMHCHEM/IMCYTCHM 1ST ANTB: CPT

## 2024-04-26 PROCEDURE — 6370000000 HC RX 637 (ALT 250 FOR IP): Performed by: PHYSICIAN ASSISTANT

## 2024-04-26 PROCEDURE — 76942 ECHO GUIDE FOR BIOPSY: CPT | Performed by: RADIOLOGY

## 2024-04-26 PROCEDURE — 99152 MOD SED SAME PHYS/QHP 5/>YRS: CPT | Performed by: RADIOLOGY

## 2024-04-26 PROCEDURE — 88307 TISSUE EXAM BY PATHOLOGIST: CPT

## 2024-04-26 PROCEDURE — 2500000003 HC RX 250 WO HCPCS: Performed by: RADIOLOGY

## 2024-04-26 PROCEDURE — 2580000003 HC RX 258: Performed by: INTERNAL MEDICINE

## 2024-04-26 PROCEDURE — 6370000000 HC RX 637 (ALT 250 FOR IP): Performed by: NURSE PRACTITIONER

## 2024-04-26 PROCEDURE — 80048 BASIC METABOLIC PNL TOTAL CA: CPT

## 2024-04-26 RX ORDER — MIDAZOLAM HYDROCHLORIDE 1 MG/ML
INJECTION INTRAMUSCULAR; INTRAVENOUS PRN
Status: COMPLETED | OUTPATIENT
Start: 2024-04-26 | End: 2024-04-26

## 2024-04-26 RX ORDER — FENTANYL CITRATE 50 UG/ML
INJECTION, SOLUTION INTRAMUSCULAR; INTRAVENOUS PRN
Status: COMPLETED | OUTPATIENT
Start: 2024-04-26 | End: 2024-04-26

## 2024-04-26 RX ORDER — LIDOCAINE HYDROCHLORIDE 20 MG/ML
INJECTION, SOLUTION INFILTRATION; PERINEURAL PRN
Status: COMPLETED | OUTPATIENT
Start: 2024-04-26 | End: 2024-04-26

## 2024-04-26 RX ADMIN — Medication 1 CAPSULE: at 13:38

## 2024-04-26 RX ADMIN — POLYETHYLENE GLYCOL 3350 17 G: 17 POWDER, FOR SOLUTION ORAL at 13:39

## 2024-04-26 RX ADMIN — SODIUM CHLORIDE, PRESERVATIVE FREE 10 ML: 5 INJECTION INTRAVENOUS at 20:31

## 2024-04-26 RX ADMIN — FENTANYL CITRATE 50 MCG: 50 INJECTION, SOLUTION INTRAMUSCULAR; INTRAVENOUS at 11:42

## 2024-04-26 RX ADMIN — INSULIN LISPRO 4 UNITS: 100 INJECTION, SOLUTION INTRAVENOUS; SUBCUTANEOUS at 17:15

## 2024-04-26 RX ADMIN — LEVETIRACETAM 500 MG: 500 TABLET, FILM COATED ORAL at 20:30

## 2024-04-26 RX ADMIN — LIDOCAINE HYDROCHLORIDE 8 ML: 20 INJECTION, SOLUTION INFILTRATION; PERINEURAL at 11:52

## 2024-04-26 RX ADMIN — INSULIN GLARGINE 25 UNITS: 100 INJECTION, SOLUTION SUBCUTANEOUS at 20:30

## 2024-04-26 RX ADMIN — DOCUSATE SODIUM 100 MG: 100 CAPSULE, LIQUID FILLED ORAL at 20:30

## 2024-04-26 RX ADMIN — MIDAZOLAM 1 MG: 1 INJECTION INTRAMUSCULAR; INTRAVENOUS at 11:42

## 2024-04-26 RX ADMIN — AMLODIPINE BESYLATE 10 MG: 10 TABLET ORAL at 13:38

## 2024-04-26 RX ADMIN — ASPIRIN 81 MG: 81 TABLET, COATED ORAL at 13:38

## 2024-04-26 RX ADMIN — INSULIN LISPRO 4 UNITS: 100 INJECTION, SOLUTION INTRAVENOUS; SUBCUTANEOUS at 08:55

## 2024-04-26 RX ADMIN — ONDANSETRON 4 MG: 2 INJECTION INTRAMUSCULAR; INTRAVENOUS at 19:30

## 2024-04-26 RX ADMIN — SODIUM CHLORIDE, PRESERVATIVE FREE 10 ML: 5 INJECTION INTRAVENOUS at 13:40

## 2024-04-26 RX ADMIN — INSULIN LISPRO 4 UNITS: 100 INJECTION, SOLUTION INTRAVENOUS; SUBCUTANEOUS at 14:35

## 2024-04-26 RX ADMIN — LEVETIRACETAM 500 MG: 500 TABLET, FILM COATED ORAL at 13:39

## 2024-04-26 RX ADMIN — INSULIN LISPRO 2 UNITS: 100 INJECTION, SOLUTION INTRAVENOUS; SUBCUTANEOUS at 08:53

## 2024-04-26 RX ADMIN — DOCUSATE SODIUM 100 MG: 100 CAPSULE, LIQUID FILLED ORAL at 13:39

## 2024-04-26 ASSESSMENT — PAIN DESCRIPTION - LOCATION: LOCATION: ABDOMEN

## 2024-04-26 ASSESSMENT — PAIN SCALES - GENERAL: PAINLEVEL_OUTOF10: 2

## 2024-04-26 NOTE — OR NURSING
TRANSFER - OUT REPORT:           Verbal report given to GRAEME Roldan on Jayy Johnson  being transferred to IR Recovery for routine post-op      Report consisted of patient’s Situation, Background, Assessment and Recommendations(SBAR).          Information from the following report(s) SBAR, Procedure Summary, and MAR was reviewed with the receiving nurse.       Opportunity for questions and clarification was provided.          Conscious Sedation:    50 Mcg of Fentanyl administered   1 Mg of Versed administered   0 Mg of Benadryl administered        Pt tolerated procedure well.     bandaid-type dressing clean, dry, intact, and nontender    VITALS:  BP (!) 142/64   Pulse 56   Temp 99.5 °F (37.5 °C) (Axillary)   Resp 16   Ht 1.651 m (5' 5\")   Wt 94.3 kg (208 lb)   SpO2 97%   BMI 34.61 kg/m²

## 2024-04-26 NOTE — CARE COORDINATION
Courtney from [facility] made aware pt is back from IR and he is ready for discharge when everything is arranged.    Update 1548: Spoke with [facility], all equipment will be delivered to family tonight. Pt will be discharged at 9am via [facility] to home with hospice.

## 2024-04-26 NOTE — DISCHARGE SUMMARY
Hospitalist Discharge Summary   Admit Date:  2024  1:27 PM   DC Note date: 2024  Name:  Jayy Johnson   Age:  84 y.o.  Sex:  male  :  1940   MRN:  [id number]   Room:  [facility]  PCP:  Elver Trevino MD    Presenting Complaint: Hyperglycemia     Initial Admission Diagnosis: Hyperglycemia [R73.9]  Right leg weakness [R29.898]  [place] (acute kidney injury) (HCC) [N17.9]     Problem List for this Hospitalization (present on admission):    Principal Problem:    Cardioembolic stroke (HCC)  Active Problems:    [place] (acute kidney injury) (HCC)    Chronic atrial fibrillation (HCC)    Dyslipidemia    Atherosclerosis of native coronary artery of native heart with stable angina pectoris (HCC)    Essential hypertension, benign    Urinary retention    Bilateral carotid artery stenosis    Type 2 diabetes mellitus (HCC)    Epilepsy (HCC)    History of cardioembolic cerebrovascular accident (CVA)    Right leg weakness    Hyponatremia    Dehydration    Leukocytosis    Right middle lobe pneumonia    Pancreatic mass    Lesion of spleen    Cyst of right kidney    Nausea    Concern about cancer without diagnosis    Secondary hypercoagulable state (HCC)    Superficial thrombophlebitis of right upper extremity  Resolved Problems:    * No resolved hospital problems. *      Hospital Course:  Jayy Johnson is an 84 y.o. CM with persistent atrial fibrillation (previously on Xarelto though changed to Eliquis per Cardiology on 24), HTN, DM2, epilepsy and recent cardioembolic CVA who presented back to the ER on  with worsening R leg weakness and generalized fatigue after being discharged home on .  He was found w/ [place] and hyponatremia and admitted to the Hospitalist service.  Patient had repeat MRI brains and Neurology recommended mgmt as is done with acute infarcts.  Neurology signed off and requested outpatient follow up.      Mr. Johnson had persistent nausea prompting a CT abd/pel on  which  4/24/2024  Thrombus is present in the right medial cubital vein, otherwise no evidence of venous thrombus in the right upper extremity     XR CHEST PORTABLE    Result Date: 4/23/2024  1. Bilateral prominence of the interstitium in the peribronchial regions and lung bases could be related to some bronchitis and pneumonitis.     CT ABDOMEN PELVIS W IV CONTRAST Additional Contrast? Oral    Result Date: 4/22/2024  1. Multiple irregular hypodense masses throughout the liver, most consistent with metastases. 2. Geographic hypodensities throughout the spleen may represent metastatic process or primary neoplasm involving the spleen. 3. Probable hypodense mass in the tail of the pancreas. 4. Ovoid hypodensity adjacent to the right kidney may represent an ectopic cyst or complex cyst. Ultrasound could better evaluate if clinically indicated. If there are any questions about this report, I can be reached on [employer] or at [phone]     CT HEAD WO CONTRAST    Result Date: 4/21/2024  Stable infarction right frontal lobe with mild hypoattenuated changes in the left parafalcine region correlating with the history of infarction in this region on the recent diffusion weighted images. Left cerebellar hemispheric infarcts do not demonstrate attenuation changes currently.    XR ABDOMEN (KUB) (SINGLE AP VIEW)    Result Date: 4/20/2024  There is air within the descending colon which does not appear to be dilated. Otherwise nonobstructive bowel gas pattern    MRI BRAIN WO CONTRAST    Result Date: 4/20/2024  Right frontal lobe subacute infarct and tiny left cerebellar subacute infarcts are unchanged from yesterday. Left medial frontoparietal subacute  infarcts have slightly progressed from yesterday. No mass effect or hemorrhagic conversion.     CT HEAD WO CONTRAST    Result Date: 4/20/2024  1.  Evolving acute infarct is again seen in the right frontal lobe. 2.  Moderate cerebral atrophy and mild periventricular white matter disease are  (Restricted: peds pts or suitable admitted adults) [[id number]] Collected: 04/23/24 1542    Order Status: Completed Specimen: Nasopharyngeal Updated: 04/23/24 1645     Adenovirus by PCR NOT DETECTED        Coronavirus 229E by PCR NOT DETECTED        Coronavirus HKU1 by PCR NOT DETECTED        Coronavirus NL63 by PCR NOT DETECTED        Coronavirus OC43 by PCR NOT DETECTED        SARS-CoV-2, PCR NOT DETECTED        Human Metapneumovirus by PCR NOT DETECTED        Rhinovirus Enterovirus PCR NOT DETECTED        Influenza A by PCR NOT DETECTED        Influenza B PCR NOT DETECTED        Parainfluenza 1 PCR NOT DETECTED        Parainfluenza 2 PCR NOT DETECTED        Parainfluenza 3 PCR NOT DETECTED        Parainfluenza 4 PCR NOT DETECTED        Respiratory Syncytial Virus by PCR NOT DETECTED        Bordetella parapertussis by PCR NOT DETECTED        Bordetella pertussis by PCR NOT DETECTED        Chlamydophila Pneumonia PCR NOT DETECTED        Mycoplasma pneumo by PCR NOT DETECTED       Culture, Blood 1 [[id number]] Collected: 04/22/24 1834    Order Status: Completed Specimen: Blood Updated: 04/26/24 0959     Special Requests --        LEFT  HAND       Culture NO GROWTH 4 DAYS       Culture, Blood 1 [[id number]] Collected: 04/22/24 1801    Order Status: Completed Specimen: Blood Updated: 04/26/24 0959     Special Requests --        LEFT  Antecubital       Culture NO GROWTH 4 DAYS       Culture, Respiratory [[id number]] Collected: 04/16/24 2145    Order Status: Canceled Specimen: Sputum Expectorated             All Labs from Last 24 Hrs:  Recent Results (from the past 24 hour(s))   POCT Glucose    Collection Time: 04/25/24  3:58 PM   Result Value Ref Range    POC Glucose 268 (H) 65 - 100 mg/dL    Performed by: Bud    POCT Glucose    Collection Time: 04/25/24  7:51 PM   Result Value Ref Range    POC Glucose 289 (H) 65 - 100 mg/dL    Performed by: Marcela(AS)    Basic Metabolic Panel w/ Reflex to MG     Collection Time: 04/26/24  6:22 AM   Result Value Ref Range    Sodium 138 136 - 145 mmol/L    Potassium 4.3 3.5 - 5.1 mmol/L    Chloride 108 (H) 98 - 107 mmol/L    CO2 21 20 - 28 mmol/L    Anion Gap 9 9 - 18 mmol/L    Glucose 165 (H) 70 - 99 mg/dL    BUN 43 (H) 8 - 23 MG/DL    Creatinine 1.33 (H) 0.80 - 1.30 MG/DL    Est, Glom Filt Rate 53 (L) >60 ml/min/1.73m2    Calcium 8.4 (L) 8.8 - 10.2 MG/DL   POCT Glucose    Collection Time: 04/26/24  6:34 AM   Result Value Ref Range    POC Glucose 218 (H) 65 - 100 mg/dL    Performed by: Marcela(AS)    POCT Glucose    Collection Time: 04/26/24 10:33 AM   Result Value Ref Range    POC Glucose 120 (H) 65 - 100 mg/dL    Performed by: Cailin    POCT Glucose    Collection Time: 04/26/24  1:18 PM   Result Value Ref Range    POC Glucose 93 65 - 100 mg/dL    Performed by: Bud        Recent Labs     04/23/24  1542   COVID19 NOT DETECTED       Recent Vital Data:  Patient Vitals for the past 24 hrs:   Temp Pulse Resp BP SpO2   04/26/24 1439 97.5 °F (36.4 °C) 72 18 (!) 166/71 99 %   04/26/24 1319 97.7 °F (36.5 °C) 54 17 (!) 146/79 97 %   04/26/24 1200 -- 56 16 (!) 142/64 97 %   04/26/24 1155 -- 64 16 (!) 144/64 97 %   04/26/24 1150 -- 69 16 (!) 141/69 97 %   04/26/24 1145 -- 52 16 (!) 145/68 96 %   04/26/24 1140 -- 56 16 (!) 180/76 98 %   04/26/24 1020 99.5 °F (37.5 °C) 60 16 (!) 163/71 95 %   04/26/24 0800 97.5 °F (36.4 °C) 76 18 (!) 151/91 95 %   04/26/24 0400 97.5 °F (36.4 °C) 85 18 (!) 166/69 95 %   04/26/24 0000 97.9 °F (36.6 °C) 78 16 (!) 159/99 92 %   04/25/24 2321 -- 62 -- -- --   04/25/24 1950 97.9 °F (36.6 °C) 89 18 (!) 145/91 94 %   04/25/24 1600 97.7 °F (36.5 °C) 70 18 (!) 153/71 --   04/25/24 1557 -- -- -- -- 95 %       Oxygen Therapy  SpO2: 99 %  Pulse Oximetry Type: Intermittent  Pulse via Oximetry: 59 beats per minute  Pulse Oximeter Device Mode: Intermittent  O2 Device: Nasal cannula  O2 Flow Rate (L/min): 3 L/min    Estimated body mass

## 2024-04-26 NOTE — PLAN OF CARE
Problem: Safety - Adult  Goal: Free from fall injury  4/25/2024 2246 by Edita Harding RN  Outcome: Progressing  Flowsheets (Taken 4/25/2024 2000)  Free From Fall Injury: Instruct family/caregiver on patient safety  4/25/2024 1409 by Slick Weiner RN  Outcome: Progressing     Problem: Discharge Planning  Goal: Discharge to home or other facility with appropriate resources  4/25/2024 2246 by Edita Harding RN  Outcome: Progressing  Flowsheets (Taken 4/25/2024 2000)  Discharge to home or other facility with appropriate resources: Identify barriers to discharge with patient and caregiver  4/25/2024 1409 by Slick Weiner RN  Outcome: Progressing     Problem: Pain  Goal: Verbalizes/displays adequate comfort level or baseline comfort level  4/25/2024 2246 by Edita Harding RN  Outcome: Progressing  Flowsheets (Taken 4/25/2024 2000)  Verbalizes/displays adequate comfort level or baseline comfort level: Encourage patient to monitor pain and request assistance  4/25/2024 1409 by Slick Weiner RN  Outcome: Progressing     Problem: Skin/Tissue Integrity  Goal: Absence of new skin breakdown  Description: 1.  Monitor for areas of redness and/or skin breakdown  2.  Assess vascular access sites hourly  3.  Every 4-6 hours minimum:  Change oxygen saturation probe site  4.  Every 4-6 hours:  If on nasal continuous positive airway pressure, respiratory therapy assess nares and determine need for appliance change or resting period.  4/25/2024 2246 by Edita Harding RN  Outcome: Progressing  4/25/2024 1409 by Slick Weiner RN  Outcome: Progressing     Problem: Neurosensory - Adult  Goal: Achieves stable or improved neurological status  4/25/2024 2246 by Edita Harding RN  Outcome: Progressing  Flowsheets (Taken 4/25/2024 2000)  Achieves stable or improved neurological status: Assess for and report changes in neurological status  4/25/2024 1409 by Slick Weiner RN  Outcome: Progressing     Problem: Respiratory -  health care team  7. Initiate Consults from Ethics, Palliative Care or initiate Family Care Conference as is appropriate  4/25/2024 2246 by Edita Harding, RN  Outcome: Progressing  Flowsheets (Taken 4/25/2024 2000)  Patient/family able to effectively weigh alternatives and participate in decision making related to treatment and care: Determine when there are differences between patient's view, family's view, and healthcare provider's view of condition  4/25/2024 1409 by Slick Weiner, RN  Outcome: Progressing

## 2024-04-26 NOTE — DIABETES MGMT
Patient admitted with cardioembolic stroke. Blood glucose ranged 211-289 yesterday with patient receiving Lantus 25 units and Humalog 16 units. Blood glucose this morning was 218. Creatinine 1.33. GFR 53. Reviewed patient current regimen: Lantus 25 units nightly, Humalog 4 units with meals and Humalog correctional insulin. Noted patient NPO. Per chart review palliative care following and discussing goals with patient family. No insulin changes recommended at this time as patient is NPO.

## 2024-04-26 NOTE — PROGRESS NOTES
Occupational Therapy Note:    Attempted to see patient this AM for occupational therapy treatment  session. Patient is currently off the floor for liver biopsy. Will follow and re-attempt as schedule permits/patient available. Thank you,    CHENTE Portillo, OT    Rehab Caseload Tracker

## 2024-04-26 NOTE — BRIEF OP NOTE
[facility]  Department of Interventional Radiology  [phone]       Brief Procedure Note    Patient: Jayy Johnson MRN: [id number]  SSN: [id number]    YOB: 1940  Age: 84 y.o.  Sex: male      Date of Procedure: 4/26/2024    Pre-Procedure Diagnosis: Pancreas mass.  Liver masses.      Post-Procedure Diagnosis: SAME    Procedure(s):  Image Guided Biopsy    Brief Description of Procedure: Right lobe, liver mass    Performed By: ELMA CROUCH MD     Assistants: None    Anesthesia:Moderate Sedation    Estimated Blood Loss: Less than 10ml    Specimens:  Pathology    Implants:  None    Findings: Multiple liver masses.  Small volume ascites.     Complications: None    Recommendations: 2 hour bedrest.  Hold anticoagulation until 4/28/24.       Follow Up: PRN.     Signed By: ELMA CROUCH MD     April 26, 2024

## 2024-04-26 NOTE — PRE SEDATION
Sedation Pre-Procedure Note    Patient Name: Jayy Johnson   YOB: 1940  Room/Bed: [facility] [place]  Medical Record Number: [id number]  Date: 4/26/2024   Time: 10:24 AM       Indication:  Liver biopsy    Consent: Obtained by family    Vital Signs:   Vitals:    04/26/24 1020   BP: (!) 163/71   Pulse: 60   Resp: 16   Temp: 99.5 °F (37.5 °C)   SpO2: 95%       Past Medical History:   has a past medical history of Arrhythmia, Arthritis, CAD (coronary artery disease), Chronic kidney disease, Coagulation disorder (HCC), Coronary atherosclerosis of native coronary artery, Diabetes (HCC), Dyslipidemia, Essential hypertension, benign, History of coronary artery stent placement, History of kidney stones, History of prior ablation treatment, Kidney disease, Kidney stone, Paroxysmal atrial fibrillation (HCC), Platelet inhibition due to Plavix, Post PTCA, Sepsis (HCC), Shingles outbreak, and Syncope and collapse.    Past Surgical History:   has a past surgical history that includes pr unlisted procedure cardiac surgery (2014); cyst removal (years ago); Cardiac catheterization (2010); and Cataract removal (Bilateral).    Medications:   Scheduled Meds:    apixaban  5 mg Oral BID    insulin lispro  4 Units SubCUTAneous TID WC    amLODIPine  10 mg Oral Daily    insulin glargine  25 Units SubCUTAneous Nightly    insulin lispro  0-8 Units SubCUTAneous TID WC    insulin lispro  0-4 Units SubCUTAneous Nightly    lactobacillus  1 capsule Oral Daily with breakfast    aspirin  81 mg Oral Daily    docusate sodium  100 mg Oral BID    [Held by provider] hydroCHLOROthiazide  12.5 mg Oral Daily    levETIRAcetam  500 mg Oral BID    [Held by provider] losartan  100 mg Oral Daily    polyethylene glycol  17 g Oral Daily    sodium chloride flush  5-40 mL IntraVENous 2 times per day     Continuous Infusions:    sodium chloride      dextrose       PRN Meds: oxyCODONE, diatrizoate meglumine-sodium, albuterol sulfate HFA, promethazine,

## 2024-04-26 NOTE — PROGRESS NOTES
TRANSFER - IN REPORT:    Verbal report received from Gela BEDOLLA on Jayy Johnson  being received from IR PACU for routine progression of patient care      Report consisted of patient's Situation, Background, Assessment and   Recommendations(SBAR).     Information from the following report(s) Nurse Handoff Report was reviewed with the receiving nurse.    Opportunity for questions and clarification was provided.      Assessment completed upon patient's arrival to unit and care assumed.

## 2024-04-26 NOTE — OR NURSING
TRANSFER - OUT REPORT:           Verbal report given to GRAEME Martini on Jayy Johnson  being transferred to 4th Floor for routine progression of patient care      Report consisted of patient’s Situation, Background, Assessment and Recommendations(SBAR).          Information from the following report(s) SBAR, Procedure Summary, and MAR was reviewed with the receiving nurse.       Opportunity for questions and clarification was provided.          Conscious Sedation:    50 Mcg of Fentanyl administered   1 Mg of Versed administered   0 Mg of Benadryl administered        Pt tolerated procedure well.     bandaid-type dressing clean, dry, intact, and nontender    VITALS:  BP (!) 142/64   Pulse 56   Temp 99.5 °F (37.5 °C) (Axillary)   Resp 16   Ht 1.651 m (5' 5\")   Wt 94.3 kg (208 lb)   SpO2 97%   BMI 34.61 kg/m²

## 2024-04-27 VITALS
TEMPERATURE: 98.6 F | HEIGHT: 65 IN | WEIGHT: 208 LBS | SYSTOLIC BLOOD PRESSURE: 151 MMHG | OXYGEN SATURATION: 97 % | DIASTOLIC BLOOD PRESSURE: 89 MMHG | RESPIRATION RATE: 18 BRPM | BODY MASS INDEX: 34.66 KG/M2 | HEART RATE: 85 BPM

## 2024-04-27 LAB
BACTERIA SPEC CULT: NORMAL
BACTERIA SPEC CULT: NORMAL
GLUCOSE BLD STRIP.AUTO-MCNC: 151 MG/DL (ref 65–100)
SERVICE CMNT-IMP: ABNORMAL
SERVICE CMNT-IMP: NORMAL
SERVICE CMNT-IMP: NORMAL

## 2024-04-27 PROCEDURE — 6370000000 HC RX 637 (ALT 250 FOR IP): Performed by: PHYSICIAN ASSISTANT

## 2024-04-27 PROCEDURE — 2580000003 HC RX 258: Performed by: INTERNAL MEDICINE

## 2024-04-27 PROCEDURE — 6360000002 HC RX W HCPCS: Performed by: INTERNAL MEDICINE

## 2024-04-27 PROCEDURE — 6370000000 HC RX 637 (ALT 250 FOR IP): Performed by: NURSE PRACTITIONER

## 2024-04-27 PROCEDURE — 82962 GLUCOSE BLOOD TEST: CPT

## 2024-04-27 PROCEDURE — 6370000000 HC RX 637 (ALT 250 FOR IP): Performed by: INTERNAL MEDICINE

## 2024-04-27 RX ADMIN — LEVETIRACETAM 500 MG: 500 TABLET, FILM COATED ORAL at 08:21

## 2024-04-27 RX ADMIN — INSULIN LISPRO 4 UNITS: 100 INJECTION, SOLUTION INTRAVENOUS; SUBCUTANEOUS at 08:22

## 2024-04-27 RX ADMIN — ONDANSETRON 4 MG: 2 INJECTION INTRAMUSCULAR; INTRAVENOUS at 03:41

## 2024-04-27 RX ADMIN — AMLODIPINE BESYLATE 10 MG: 10 TABLET ORAL at 08:19

## 2024-04-27 RX ADMIN — POLYETHYLENE GLYCOL 3350 17 G: 17 POWDER, FOR SOLUTION ORAL at 08:21

## 2024-04-27 RX ADMIN — DOCUSATE SODIUM 100 MG: 100 CAPSULE, LIQUID FILLED ORAL at 08:21

## 2024-04-27 RX ADMIN — SODIUM CHLORIDE, PRESERVATIVE FREE 10 ML: 5 INJECTION INTRAVENOUS at 08:21

## 2024-04-27 RX ADMIN — ASPIRIN 81 MG: 81 TABLET, COATED ORAL at 08:20

## 2024-04-27 RX ADMIN — Medication 1 CAPSULE: at 08:20

## 2024-04-27 ASSESSMENT — PAIN SCALES - GENERAL: PAINLEVEL_OUTOF10: 0

## 2024-04-27 NOTE — CARE COORDINATION
Discharge order is in. Pt is discharging home today with [facility]. [facility] arranged by primary CM for 0900. No other discharge needs identified by CM. Tx goals met.       04/27/24 0846   Services At/After Discharge   Transition of Care Consult (CM Consult) Hospice;Discharge Planning;Transportation Assistance   Internal Hospice No   Services At/After Discharge In ambulance;Hospice   [place] Resource Information Provided? No   Mode of Transport at Discharge [facility]   Hospital Transport Time of Discharge 0900   Confirm Follow Up Transport Family   Condition of Participation: Discharge Planning   The Patient and/or Patient Representative was provided with a Choice of Provider? Patient   The Patient and/Or Patient Representative agree with the Discharge Plan? Yes   Freedom of Choice list was provided with basic dialogue that supports the patient's individualized plan of care/goals, treatment preferences, and shares the quality data associated with the providers?  Yes

## 2024-04-27 NOTE — PLAN OF CARE
Problem: Safety - Adult  Goal: Free from fall injury  4/26/2024 2155 by Iveth Gutierrez RN  Outcome: Progressing  Flowsheets (Taken 4/26/2024 1910)  Free From Fall Injury: Instruct family/caregiver on patient safety  4/26/2024 1007 by Vini Ibrahim RN  Outcome: Progressing     Problem: Discharge Planning  Goal: Discharge to home or other facility with appropriate resources  4/26/2024 2155 by Iveth Gutierrez RN  Outcome: Progressing  Flowsheets (Taken 4/26/2024 1910)  Discharge to home or other facility with appropriate resources: Identify barriers to discharge with patient and caregiver  4/26/2024 1007 by Vini Ibrahim RN  Outcome: Progressing     Problem: Pain  Goal: Verbalizes/displays adequate comfort level or baseline comfort level  4/26/2024 2155 by Iveth Gutierrez RN  Outcome: Progressing  4/26/2024 1007 by Vini Ibrahim RN  Outcome: Progressing     Problem: Skin/Tissue Integrity  Goal: Absence of new skin breakdown  Description: 1.  Monitor for areas of redness and/or skin breakdown  2.  Assess vascular access sites hourly  3.  Every 4-6 hours minimum:  Change oxygen saturation probe site  4.  Every 4-6 hours:  If on nasal continuous positive airway pressure, respiratory therapy assess nares and determine need for appliance change or resting period.  4/26/2024 2155 by Iveth Gutierrez RN  Outcome: Progressing  4/26/2024 1007 by Vini Ibrahim RN  Outcome: Progressing     Problem: Neurosensory - Adult  Goal: Achieves stable or improved neurological status  4/26/2024 2155 by Iveth Gutierrez RN  Outcome: Progressing  4/26/2024 1007 by Vini Ibrahim RN  Outcome: Progressing     Problem: Respiratory - Adult  Goal: Achieves optimal ventilation and oxygenation  4/26/2024 2155 by Iveth Gutierrez RN  Outcome: Progressing  4/26/2024 1007 by Vini Ibrahim RN  Outcome: Progressing     Problem: Cardiovascular - Adult  Goal: Maintains optimal cardiac output and hemodynamic stability  4/26/2024 2155 by Iveth Gutierrez  signs and symptoms of volume excess or deficit  4/26/2024 1007 by Vini Ibrahim RN  Outcome: Progressing  Goal: Glucose maintained within prescribed range  4/26/2024 2155 by Iveth Gutierrez RN  Outcome: Progressing  Flowsheets (Taken 4/26/2024 1910)  Glucose maintained within prescribed range: Monitor blood glucose as ordered  4/26/2024 1007 by Vini Ibrahim RN  Outcome: Progressing     Problem: Hematologic - Adult  Goal: Maintains hematologic stability  4/26/2024 2155 by Iveth Gutierrez RN  Outcome: Progressing  Flowsheets (Taken 4/26/2024 1910)  Maintains hematologic stability: Assess for signs and symptoms of bleeding or hemorrhage  4/26/2024 1007 by Vini Ibrahim RN  Outcome: Progressing     Problem: Chronic Conditions and Co-morbidities  Goal: Patient's chronic conditions and co-morbidity symptoms are monitored and maintained or improved  4/26/2024 2155 by Iveth Gutierrez RN  Outcome: Progressing  Flowsheets (Taken 4/26/2024 1910)  Care Plan - Patient's Chronic Conditions and Co-Morbidity Symptoms are Monitored and Maintained or Improved: Monitor and assess patient's chronic conditions and comorbid symptoms for stability, deterioration, or improvement  4/26/2024 1007 by Vini Ibrahim RN  Outcome: Progressing     Problem: Coping  Goal: Pt/Family able to verbalize concerns and demonstrate effective coping strategies  Description: INTERVENTIONS:  1. Assist patient/family to identify coping skills, available support systems and cultural and spiritual values  2. Provide emotional support, including active listening and acknowledgement of concerns of patient and caregivers  3. Reduce environmental stimuli, as able  4. Instruct patient/family in relaxation techniques, as appropriate  5. Assess for spiritual pain/suffering and initiate Spiritual Care, Psychosocial Clinical Specialist consults as needed  4/26/2024 2155 by Iveth Gutierrez RN  Outcome: Progressing  Flowsheets (Taken 4/26/2024 1910)  Patient/family able to  verbalize anxieties, fears, and concerns, and demonstrate effective coping: Assist patient/family to identify coping skills, available support systems and cultural and spiritual values  4/26/2024 1007 by Vini Ibrahim, RN  Outcome: Progressing     Problem: Decision Making  Goal: Pt/Family able to effectively weigh alternatives and participate in decision making related to treatment and care  Description: INTERVENTIONS:  1. Determine when there are differences between patient's view, family's view, and healthcare provider's view of condition  2. Facilitate patient and family articulation of goals for care  3. Help patient and family identify pros/cons of alternative solutions  4. Provide information as requested by patient/family  5. Respect patient/family right to receive or not to receive information  6. Serve as a liaison between patient and family and health care team  7. Initiate Consults from Ethics, Palliative Care or initiate Family Care Conference as is appropriate  4/26/2024 2155 by Iveth Gutierrez, RN  Outcome: Progressing  Flowsheets (Taken 4/26/2024 1910)  Patient/family able to effectively weigh alternatives and participate in decision making related to treatment and care: Determine when there are differences between patient's view, family's view, and healthcare provider's view of condition  4/26/2024 1007 by Vini Ibrahim, RN  Outcome: Progressing

## 2024-04-27 NOTE — DISCHARGE INSTRUCTIONS
Learning About How to Care for a Person's Indwelling Urinary Catheter  Introduction     A urinary catheter is a flexible plastic tube that's used to drain urine from the bladder when a person can't urinate. The catheter is placed into the bladder by inserting it through the urethra. The urethra is the opening that carries urine from the bladder to the outside of the body.  When the catheter is in the bladder, a small balloon is used to keep the catheter in place. The catheter lets urine drain from the bladder into a collection bag. Urinary catheters can be used in both men and women. A catheter that stays in place for a longer period of time is called an indwelling catheter.  A catheter may be needed because of certain medical conditions. These include an enlarged prostate or problems controlling urine. It may be used after surgery on the pelvis or urinary tract. Urinary catheters are also used when the lower part of the body is paralyzed.  When helping someone with a catheter, try to be relaxed. Caring for a catheter can be embarrassing for both of you. If you are calm and don't seem embarrassed, the person may feel more comfortable.  How do you take care of the catheter?  Wear disposable gloves when handling someone's catheter. Make sure to follow all of the instructions the doctor has given. And always wash your hands before and after you're done.  Here are some other things to remember when caring for someone's catheter:  Make sure that urine is running out of the catheter into the urine collection bag. And make sure that the catheter tubing does not get twisted or bent.  Keep the urine collection bag below the level of the bladder. At night it may be helpful to hang the bag on the side of the bed.  Make sure that the urine collection bag does not drag and pull on the catheter.  It's okay to shower with a catheter and urine collection bag in place, unless the doctor says not to.  Check for swelling or signs  Indwelling Urinary Catheter.\"  Current as of: November 15, 2023               Content Version: 14.0  © 2782-1497 [employer].   Care instructions adapted under license by [employer]. If you have questions about a medical condition or this instruction, always ask your healthcare professional. [employer] disclaims any warranty or liability for your use of this information.

## 2024-04-28 LAB
Lab: NORMAL
Lab: NORMAL
REFERENCE LAB: NORMAL

## 2024-05-01 LAB
Lab: NORMAL
Lab: NORMAL
REFERENCE LAB: NORMAL

## 2024-05-10 ENCOUNTER — TELEPHONE (OUTPATIENT)
Dept: INTERNAL MEDICINE | Age: 84
End: 2024-05-10

## 2024-05-19 PROBLEM — E86.0 DEHYDRATION: Status: RESOLVED | Noted: 2024-04-19 | Resolved: 2024-05-19
